# Patient Record
Sex: MALE | Race: WHITE | NOT HISPANIC OR LATINO | Employment: OTHER | URBAN - METROPOLITAN AREA
[De-identification: names, ages, dates, MRNs, and addresses within clinical notes are randomized per-mention and may not be internally consistent; named-entity substitution may affect disease eponyms.]

---

## 2018-10-08 ENCOUNTER — OFFICE VISIT CONVERTED (OUTPATIENT)
Dept: NEUROSURGERY | Facility: CLINIC | Age: 73
End: 2018-10-08
Attending: PHYSICIAN ASSISTANT

## 2018-10-25 ENCOUNTER — OFFICE VISIT CONVERTED (OUTPATIENT)
Dept: GASTROENTEROLOGY | Facility: CLINIC | Age: 73
End: 2018-10-25
Attending: PHYSICIAN ASSISTANT

## 2018-12-03 ENCOUNTER — CONVERSION ENCOUNTER (OUTPATIENT)
Dept: NEUROLOGY | Facility: CLINIC | Age: 73
End: 2018-12-03

## 2018-12-03 ENCOUNTER — OFFICE VISIT CONVERTED (OUTPATIENT)
Dept: NEUROSURGERY | Facility: CLINIC | Age: 73
End: 2018-12-03
Attending: NEUROLOGICAL SURGERY

## 2021-02-02 ENCOUNTER — HOSPITAL ENCOUNTER (OUTPATIENT)
Dept: VACCINE CLINIC | Facility: HOSPITAL | Age: 76
Discharge: HOME | End: 2021-02-02
Attending: INTERNAL MEDICINE

## 2021-02-25 ENCOUNTER — HOSPITAL ENCOUNTER (OUTPATIENT)
Dept: OTHER | Facility: HOSPITAL | Age: 76
Discharge: HOME | End: 2021-02-25
Attending: PHYSICIAN ASSISTANT

## 2021-02-26 ENCOUNTER — HOSPITAL ENCOUNTER (OUTPATIENT)
Dept: VACCINE CLINIC | Facility: HOSPITAL | Age: 76
Discharge: HOME | End: 2021-02-26
Attending: INTERNAL MEDICINE

## 2021-05-16 VITALS
HEIGHT: 71 IN | BODY MASS INDEX: 25.62 KG/M2 | DIASTOLIC BLOOD PRESSURE: 72 MMHG | WEIGHT: 183 LBS | SYSTOLIC BLOOD PRESSURE: 123 MMHG

## 2021-05-16 VITALS
HEART RATE: 76 BPM | HEIGHT: 71 IN | BODY MASS INDEX: 25.62 KG/M2 | SYSTOLIC BLOOD PRESSURE: 126 MMHG | WEIGHT: 183 LBS | DIASTOLIC BLOOD PRESSURE: 56 MMHG

## 2021-05-16 VITALS — BODY MASS INDEX: 26.32 KG/M2 | HEIGHT: 71 IN | HEART RATE: 72 BPM | WEIGHT: 188 LBS

## 2021-09-02 ENCOUNTER — OFFICE VISIT (OUTPATIENT)
Dept: PULMONOLOGY | Facility: CLINIC | Age: 76
End: 2021-09-02

## 2021-09-02 VITALS
SYSTOLIC BLOOD PRESSURE: 146 MMHG | TEMPERATURE: 98 F | OXYGEN SATURATION: 93 % | HEART RATE: 72 BPM | DIASTOLIC BLOOD PRESSURE: 68 MMHG | HEIGHT: 70 IN | BODY MASS INDEX: 26.34 KG/M2 | RESPIRATION RATE: 14 BRPM | WEIGHT: 184 LBS

## 2021-09-02 DIAGNOSIS — G25.2 COARSE TREMORS: ICD-10-CM

## 2021-09-02 DIAGNOSIS — J34.2 DNS (DEVIATED NASAL SEPTUM): ICD-10-CM

## 2021-09-02 DIAGNOSIS — G47.33 OSA (OBSTRUCTIVE SLEEP APNEA): ICD-10-CM

## 2021-09-02 DIAGNOSIS — J43.2 CENTRILOBULAR EMPHYSEMA (HCC): Primary | ICD-10-CM

## 2021-09-02 DIAGNOSIS — T78.40XA ALLERGY, INITIAL ENCOUNTER: ICD-10-CM

## 2021-09-02 DIAGNOSIS — G47.19 EXCESSIVE DAYTIME SLEEPINESS: ICD-10-CM

## 2021-09-02 DIAGNOSIS — R06.83 SNORING: ICD-10-CM

## 2021-09-02 DIAGNOSIS — J41.0 SIMPLE CHRONIC BRONCHITIS (HCC): ICD-10-CM

## 2021-09-02 DIAGNOSIS — H91.90 HEARING LOSS, UNSPECIFIED HEARING LOSS TYPE, UNSPECIFIED LATERALITY: ICD-10-CM

## 2021-09-02 DIAGNOSIS — R06.02 SHORTNESS OF BREATH: ICD-10-CM

## 2021-09-02 DIAGNOSIS — Z87.891 EX-SMOKER: ICD-10-CM

## 2021-09-02 DIAGNOSIS — R53.83 FATIGUE, UNSPECIFIED TYPE: ICD-10-CM

## 2021-09-02 PROCEDURE — 99203 OFFICE O/P NEW LOW 30 MIN: CPT | Performed by: SPECIALIST

## 2021-09-02 RX ORDER — ALBUTEROL SULFATE 90 UG/1
2 AEROSOL, METERED RESPIRATORY (INHALATION) EVERY 4 HOURS PRN
Qty: 1 G | Refills: 5 | Status: SHIPPED | OUTPATIENT
Start: 2021-09-02 | End: 2021-10-29

## 2021-09-02 RX ORDER — PRIMIDONE 250 MG/1
250 TABLET ORAL 2 TIMES DAILY
COMMUNITY

## 2021-09-02 RX ORDER — TESTOSTERONE CYPIONATE 200 MG/ML
0.25 INJECTION, SOLUTION INTRAMUSCULAR
COMMUNITY

## 2021-09-02 NOTE — PROGRESS NOTES
CONSULT NOTE     CHIEF COMPLAINT  Chief Complaint   Patient presents with   • Shortness of Breath   • New patient        Primary Care Provider  Aaron Sandoval MD     Referring Provider  No ref. provider found    Patient Complaint    ICD-10-CM ICD-9-CM   1. Centrilobular emphysema (CMS/[facility])  J43.2 492.8   2. Ex-smoker  Z87.891 V15.82   3. Allergy, initial encounter  T78.40XA 995.3   4. PAULA (obstructive sleep apnea)  G47.33 327.23   5. Simple chronic bronchitis (CMS/[facility])  J41.0 491.0   6. Shortness of breath  R06.02 786.05   7. Hearing loss, unspecified hearing loss type, unspecified laterality  H91.90 389.9   8. DNS (deviated nasal septum)  J34.2 470   9. Excessive daytime sleepiness  G47.19 780.54   10. Snoring  R06.83 786.09   11. Coarse tremors  G25.2 781.0   12. Fatigue, unspecified type  R53.83 780.79            Subjective          History of Presenting Illness  Rafael Salcedo is a 76 y.o. male who is a former smoker and referred to our practice for the pulmonary evaluation for the evaluation and management of shortness of breath.  Patient used to smoke for 50 years and quit in August 2020.  Patient has a dog in the house and used to work in the railroad work in the past.  Never had any alpha-1 test done and he was given inhaler and he is not sure that was helping.  Denied of having any change of weight.  He has been having the shortness of breath on any exertion and intermittent cough and somewhat hard of hearing denied any fever, chills, sweating, hemoptysis or any exposed anybody who is sick around him and no recent travel history.  Patient denies of having any loss of taste or smell.  Denied any nausea, vomiting, diarrhea and other related symptoms.  Denied any chest pain, palpitation and any other related diaphoretic symptoms.  Denied any general body weakness.  The patient was evaluated by Dr. Casey for his cardiac problems and patient had an EKG and echo and supposed to get the stress test and  the results are not available and unable to obtain at this time.  Patient has the essential peripheral tremors for which patient is taking the medications and never had any epilepsy or any other related patient is to take the some multivitamins in the past.  Patient was seen by the ENT physician who told that patient has recurrent sinus problem and has surgery done and also has a hard of hearing for which patient has the ear tubes.  Nothing was done at this time for the deviated nasal septum  I have personally reviewed the review of systems, past family, social, medical and surgical histories; and agree with their findings.  Admits for the snoring and disturbed sleep and extra daytime sleepiness during the day    HISTORY    Family History   Problem Relation Age of Onset   • Cancer Mother    • Heart failure Father    • Cancer Brother    • Cancer Maternal Uncle         Social History     Socioeconomic History   • Marital status:      Spouse name: Not on file   • Number of children: Not on file   • Years of education: Not on file   • Highest education level: Not on file   Tobacco Use   • Smoking status: Former Smoker     Packs/day: 0.50     Years: 50.00     Pack years: 25.00     Types: Cigarettes     Start date: 1971     Quit date: 2020     Years since quittin.0   • Smokeless tobacco: Never Used   • Tobacco comment: 1-6 cigg a day quit    Vaping Use   • Vaping Use: Never used   Substance and Sexual Activity   • Alcohol use: Defer   • Drug use: Defer   • Sexual activity: Defer        Past Medical History:   Diagnosis Date   • Shortness of breath           There is no immunization history on file for this patient.    No Known Allergies       Current Outpatient Medications:   •  Fexofenadine-Pseudoephedrine (ALLEGRA-D PO), Take  by mouth., Disp: , Rfl:   •  primidone (MYSOLINE) 250 MG tablet, primidone 250 mg oral tablet take 1 tablet (250 mg) by oral route 3 times per day for maintenance   Active,  Disp: , Rfl:   •  Testosterone Cypionate (Depo-Testosterone) 200 MG/ML injection, 0.25 mL., Disp: , Rfl:   •  albuterol sulfate  (90 Base) MCG/ACT inhaler, Inhale 2 puffs Every 4 (Four) Hours As Needed for Wheezing., Disp: 1 g, Rfl: 5  •  tiotropium bromide-olodaterol (STIOLTO RESPIMAT) 2.5-2.5 MCG/ACT aerosol solution inhaler, Inhale 2 puffs Daily., Disp: 1 each, Rfl: 5     Smoking status: Former smoker and quit in August 2020.  Used to smoke for 50 years.    Objective     Vital Signs:   Vitals:    09/02/21 0909   BP: 146/68   Pulse: 72   Resp: 14   Temp: 98 °F (36.7 °C)   SpO2: 93%        Physical Exam:  Alert and oriented x3.  Patient is hard of hearing.  Accompanied by his wife.  Pleasant to talk to.  Not in any apparent respiratory distress.  HEENT: Atraumatic anicteric.  Significant deviated nasal septum with almost occluded right nasal cavity.  Throat Mallampati class II-III airway high arched palate.  Neck: Supple, no JVD, trachea central.  No bruits heard.  Chest and lungs: Decreased breath sounds with prolonged expiratory phase and occasional scattered rhonchi.  Cardiovascular system: Regular rate and rhythm.  No murmurs appreciated.  Abdomen: Soft nontender no masses palpable.  Extremities: No clubbing/no cyanosis but has ankle edema.  Central nervous system: Grossly intact.  Result Review :   I have personally reviewed the report of the CT scan of the chest which was reported as moderate to severe centrilobular and paraseptal emphysema.  Mild bilateral lower lobe bronchial wall thickening what appeared to be like a bronchiectasis and coronary artery calcification.         Impression:  Former smoker  Severe shortness of breath.  Cough chronic.  Difficult to bring up the phlegm.  Abnormal CT scan of the chest as noted suggestive also of bronchiectasis in addition to the emphysema.  Not on any maintenance inhalation therapy.  Significant sleep disturbance with the snoring and disturbed sleep and  nocturia and excessive daytime sleepiness and significant deviated nasal septum and need to work-up for obstructive sleep apnea      Plan:  Patient education.  Give the patient Stiolto to take 2 puffs/day along with the rescue inhalation therapy.  And albuterol rescue inhalation therapy  Acapella.  Alpha-1 antitrypsin with the phenotype.  Complete pulmonary function testing and 6-minute walk.  IgE and mini Rast test.  Home sleep study  We will get the QuantiFERON gold for the TB screening.  We will get the CBC and other is as ordered.  General medical management is being well done by primary team physician.  If possible, we will try to get the echocardiogram and the cardiac work-up and management if necessary present management may need to be modified.  Patient education.    Follow Up   Return in about 3 months (around 12/2/2021).  Patient was given instructions and counseling regarding his condition or for health maintenance advice. Please see specific information pulled into the AVS if appropriate.     Henri Matthew MD

## 2021-09-02 NOTE — PATIENT INSTRUCTIONS
Allergies, Adult  An allergy means that your body reacts to something that bothers it (allergen). This can happen from something that you eat, breathe in, or touch.  Allergies often affect the nose, eyes, skin, and stomach. They can be mild, moderate, or very bad (severe). An allergy cannot spread from person to person. They can happen at any age. Sometimes, people outgrow them.  What are the causes?  · Outdoor things, such as pollen, car fumes, and mold.  · Indoor things, such as dust, smoke, mold, and pets.  · Foods.  · Medicines.  · Things that bother your skin, such as perfume and bug bites.  What increases the risk?  · Having family members with allergies or asthma.  What are the signs or symptoms?  Symptoms depend on how bad your allergy is.  Mild to moderate symptoms  · Runny nose, stuffy nose, or sneezing.  · Itchy mouth, ears, or throat.  · A feeling of mucus dripping down the back of your throat.  · Sore throat.  · Eyes that are itchy, red, watery, or puffy.  · A skin rash, or red, swollen areas of skin (hives).  · Stomach cramps or bloating.  Severe symptoms  Very bad allergies to food, medicine, or bug bites may cause a very bad allergy reaction (anaphylaxis). This can be life-threatening. Symptoms include:  · A red face.  · Wheezing or coughing.  · Swollen lips, tongue, or mouth.  · Tight or swollen throat.  · Chest pain or tightness, or a fast heartbeat.  · Trouble breathing or shortness of breath.  · Pain in your belly (abdomen), vomiting, or watery poop (diarrhea).  · Feeling dizzy or fainting.  How is this treated?         Treatment for this condition depends on your symptoms. Treatment may include:  · Cold, wet cloths for itching and swelling.  · Eye drops, nose sprays, or skin creams.  · Washing out your nose each day.  · A humidifier.  · Medicines.  · A change to the foods you eat.  · Being exposed again and again to tiny amounts of allergens. This helps your body get used to them. You might  have:  ? Allergy shots.  ? Very small amounts of allergen put under your tongue.  · An emergency shot (auto-injector pen) if you have a very bad allergy reaction.  ? This is a medicine with a needle. You can put it into your skin by yourself.  ? Your doctor will teach you how to use it.  Follow these instructions at home:  Medicines    · Take or apply over-the-counter and prescription medicines only as told by your doctor.  · If you are at risk for a very bad allergy reaction, keep an auto-injector pen with you all the time.  Eating and drinking  · Follow instructions from your doctor about what to eat and drink.  · Drink enough fluid to keep your pee (urine) pale yellow.  General instructions  · If you have ever had a very bad allergy reaction, wear a medical alert bracelet or necklace.  · Stay away from things that you are allergic to.  · Keep all follow-up visits as told by your doctor. This is important.  Contact a doctor if:  · Your symptoms do not get better with treatment.  Get help right away if:  · You have symptoms of a very bad allergy reaction. These include:  ? A swollen mouth, tongue, or throat.  ? Pain or tightness in your chest.  ? Trouble breathing.  ? Being short of breath.  ? Dizziness.  ? Fainting.  ? Very bad pain in your belly.  ? Vomiting.  ? Watery poop.  These symptoms may be an emergency. Do not wait to see if the symptoms will go away. Get medical help right away. Call your local emergency services (911 in the [place] Do not drive yourself to the hospital.  Summary  · Take or apply over-the-counter and prescription medicines only as told by your doctor.  · Stay away from things you are allergic to.  · If you are at risk for a very bad allergy reaction, carry an auto-injector pen all the time.  · Wear a medical alert bracelet or necklace.  · Very bad allergy reactions can be life-threatening. Get help right away.  This information is not intended to replace advice given to you by your health  care provider. Make sure you discuss any questions you have with your health care provider.  Document Revised: 10/28/2020 Document Reviewed: 10/28/2020  Elsevier Patient Education © 2021 [employer]    Fatigue  If you have fatigue, you feel tired all the time and have a lack of energy or a lack of motivation. Fatigue may make it difficult to start or complete tasks because of exhaustion. In general, occasional or mild fatigue is often a normal response to activity or life. However, long-lasting (chronic) or extreme fatigue may be a symptom of a medical condition.  Follow these instructions at home:  General instructions  · Watch your fatigue for any changes.  · Go to bed and get up at the same time every day.  · Avoid fatigue by pacing yourself during the day and getting enough sleep at night.  · Maintain a healthy weight.  Medicines  · Take over-the-counter and prescription medicines only as told by your health care provider.  · Take a multivitamin, if told by your health care provider.   · Do not use herbal or dietary supplements unless they are approved by your health care provider.  Activity    · Exercise regularly, as told by your health care provider.  · Use or practice techniques to help you relax, such as yoga, alena chi, meditation, or massage therapy.  Eating and drinking    · Avoid heavy meals in the evening.  · Eat a well-balanced diet, which includes lean proteins, whole grains, plenty of fruits and vegetables, and low-fat dairy products.  · Avoid consuming too much caffeine.  · Avoid the use of alcohol.  · Drink enough fluid to keep your urine pale yellow.  Lifestyle  · Change situations that cause you stress. Try to keep your work and personal schedule in balance.  · Do not use any products that contain nicotine or tobacco, such as cigarettes and e-cigarettes. If you need help quitting, ask your health care provider.  · Do not use drugs.  Contact a health care provider if:  · Your fatigue does not get  better.  · You have a fever.  · You suddenly lose or gain weight.  · You have headaches.  · You have trouble falling asleep or sleeping through the night.  · You feel angry, guilty, anxious, or sad.  · You are unable to have a bowel movement (constipation).  · Your skin is dry.  · You have swelling in your legs or another part of your body.  Get help right away if:  · You feel confused.  · Your vision is blurry.  · You feel faint or you pass out.  · You have a severe headache.  · You have severe pain in your abdomen, your back, or the area between your waist and hips (pelvis).  · You have chest pain, shortness of breath, or an irregular or fast heartbeat.  · You are unable to urinate, or you urinate less than normal.  · You have abnormal bleeding, such as bleeding from the rectum, vagina, nose, lungs, or nipples.  · You vomit blood.  · You have thoughts about hurting yourself or others.  If you ever feel like you may hurt yourself or others, or have thoughts about taking your own life, get help right away. You can go to your nearest emergency department or call:  · Your local emergency services (911 in the [place]  · A suicide crisis helpline, such as the National Suicide Prevention Lifeline at 1-688.392.5657. This is open 24 hours a day.  Summary  · If you have fatigue, you feel tired all the time and have a lack of energy or a lack of motivation.  · Fatigue may make it difficult to start or complete tasks because of exhaustion.  · Long-lasting (chronic) or extreme fatigue may be a symptom of a medical condition.  · Exercise regularly, as told by your health care provider.  · Change situations that cause you stress. Try to keep your work and personal schedule in balance.  This information is not intended to replace advice given to you by your health care provider. Make sure you discuss any questions you have with your health care provider.  Document Revised: 07/08/2020 Document Reviewed: 09/12/2018  [employer] Patient  Education © 2021 [employer] Inc[employer]    Essential Tremor  A tremor is trembling or shaking that a person cannot control. Most tremors affect the hands or arms. Tremors can also affect the head, vocal cords, legs, and other parts of the body. Essential tremor is a tremor without a known cause. Usually, it occurs while a person is trying to perform an action. It tends to get worse gradually as a person ages.  What are the causes?  The cause of this condition is not known.  What increases the risk?  You are more likely to develop this condition if:  · You have a family member with essential tremor.  · You are age 40 or older.  · You take certain medicines.  What are the signs or symptoms?  The main sign of a tremor is a rhythmic shaking of certain parts of your body that is uncontrolled and unintentional. You may:  · Have difficulty eating with a spoon or fork.  · Have difficulty writing.  · Nod your head up and down or side to side.  · Have a quivering voice.  The shaking may:  · Get worse over time.  · Come and go.  · Be more noticeable on one side of your body.  · Get worse due to stress, fatigue, caffeine, and extreme heat or cold.  How is this diagnosed?  This condition may be diagnosed based on:  · Your symptoms and medical history.  · A physical exam.  There is no single test to diagnose an essential tremor. However, your health care provider may order tests to rule out other causes of your condition. These may include:  · Blood and urine tests.  · Imaging studies of your brain, such as CT scan and MRI.  · A test that measures involuntary muscle movement (electromyogram).  How is this treated?  Treatment for essential tremor depends on the severity of the condition.  · Some tremors may go away without treatment.  · Mild tremors may not need treatment if they do not affect your day-to-day life.  · Severe tremors may need to be treated using one or more of the following options:  ? Medicines.  ? Lifestyle  changes.  ? Occupational or physical therapy.  Follow these instructions at home:  Lifestyle    · Do not use any products that contain nicotine or tobacco, such as cigarettes and e-cigarettes. If you need help quitting, ask your health care provider.  · Limit your caffeine intake as told by your health care provider.  · Try to get 8 hours of sleep each night.  · Find ways to manage your stress that fits your lifestyle and personality. Consider trying meditation or yoga.  · Try to anticipate stressful situations and allow extra time to manage them.  · If you are struggling emotionally with the effects of your tremor, consider working with a mental health provider.  General instructions  · Take over-the-counter and prescription medicines only as told by your health care provider.  · Avoid extreme heat and extreme cold.  · Keep all follow-up visits as told by your health care provider. This is important. Visits may include physical therapy visits.  Contact a health care provider if:  · You experience any changes in the location or intensity of your tremors.  · You start having a tremor after starting a new medicine.  · You have tremor with other symptoms, such as:  ? Numbness.  ? Tingling.  ? Pain.  ? Weakness.  · Your tremor gets worse.  · Your tremor interferes with your daily life.  · You feel down, blue, or sad for at least 2 weeks in a row.  · Worrying about your tremor and what other people think about you interferes with your everyday life functions, including relationships, work, or school.  Summary  · Essential tremor is a tremor without a known cause. Usually, it occurs when you are trying to perform an action.  · The cause of this condition is not known.  · The main sign of a tremor is a rhythmic shaking of certain parts of your body that is uncontrolled and unintentional.  · Treatment for essential tremor depends on the severity of the condition.  This information is not intended to replace advice given to  you by your health care provider. Make sure you discuss any questions you have with your health care provider.  Document Revised: 12/28/2018 Document Reviewed: 12/28/2018  Elsevier Patient Education © 2021 [employer]    Hypersomnia  Hypersomnia is a condition in which a person feels very tired during the day even though he or she gets plenty of sleep at night. A person with this condition may take naps during the day and may find it very difficult to wake up from sleep. Hypersomnia may affect a person's ability to think, concentrate, drive, or remember things.  What are the causes?  The cause of this condition may not be known. Possible causes include:  · Certain medicines.  · Sleep disorders, such as narcolepsy and sleep apnea.  · Injury to the head, brain, or spinal cord.  · Drug or alcohol use.  · Gastroesophageal reflux disease (GERD).  · Tumors.  · Certain medical conditions, such as depression, diabetes, or an underactive thyroid gland (hypothyroidism).  What are the signs or symptoms?  The main symptoms of hypersomnia include:  · Feeling very tired throughout the day, regardless of how much sleep you got the night before.  · Having trouble waking up. Others may find it difficult to wake you up when you are sleeping.  · Sleeping for longer and longer periods at a time.  · Taking naps throughout the day.  Other symptoms may include:  · Feeling restless, anxious, or annoyed.  · Lacking energy.  · Having trouble with:  ? Remembering.  ? Speaking.  ? Thinking.  · Loss of appetite.  · Seeing, hearing, tasting, smelling, or feeling things that are not real (hallucinations).  How is this diagnosed?  This condition may be diagnosed based on:  · Your symptoms and medical history.  · Your sleeping habits. Your health care provider may ask you to write down your sleeping habits in a daily sleep log, along with any symptoms you have.  · A series of tests that are done while you sleep (sleep study or  polysomnogram).  · A test that measures how quickly you can fall asleep during the day (daytime nap study or multiple sleep latency test).  How is this treated?  Treatment can help you manage your condition. Treatment may include:  · Following a regular sleep routine.  · Lifestyle changes, such as changing your eating habits, getting regular exercise, and avoiding alcohol or caffeinated beverages.  · Taking medicines to make you more alert (stimulants) during the day.  · Treating any underlying medical causes of hypersomnia.  Follow these instructions at home:  Sleep routine    · Schedule the same bedtime and wake-up time each day.  · Practice a relaxing bedtime routine. This may include reading, meditation, deep breathing, or taking a warm bath before going to sleep.  · Get regular exercise each day. Avoid strenuous exercise in the evening hours.  · Keep your sleep environment at a cooler temperature, darkened, and quiet.  · Sleep with pillows and a mattress that are comfortable and supportive.  · Schedule short 20-minute naps for when you feel sleepiest during the day.  · Talk with your employer or teachers about your hypersomnia. If possible, adjust your schedule so that:  ? You have a regular daytime work schedule.  ? You can take a scheduled nap during the day.  ? You do not have to work or be active at night.  · Do not eat a heavy meal for a few hours before bedtime. Eat your meals at about the same times every day.  · Avoid drinking alcohol or caffeinated beverages.  Safety    · Do not drive or use heavy machinery if you are sleepy. Ask your health care provider if it is safe for you to drive.  · Wear a life jacket when swimming or spending time near water.  General instructions  · Take supplements and over-the-counter and prescription medicines only as told by your health care provider.  · Keep a sleep log that will help your doctor manage your condition. This may include information about:  ? What time you  go to bed each night.  ? How often you wake up at night.  ? How many hours you sleep at night.  ? How often and for how long you nap during the day.  ? Any observations from others, such as leg movements during sleep, sleep walking, or snoring.  · Keep all follow-up visits as told by your health care provider. This is important.  Contact a health care provider if:  · You have new symptoms.  · Your symptoms get worse.  Get help right away if:  · You have serious thoughts about hurting yourself or someone else.  If you ever feel like you may hurt yourself or others, or have thoughts about taking your own life, get help right away. You can go to your nearest emergency department or call:  · Your local emergency services (911 in the [place]  · A suicide crisis helpline, such as the National Suicide Prevention Lifeline at 1-420.621.4245. This is open 24 hours a day.  Summary  · Hypersomnia refers to a condition in which you feel very tired during the day even though you get plenty of sleep at night.  · A person with this condition may take naps during the day and may find it very difficult to wake up from sleep.  · Hypersomnia may affect a person's ability to think, concentrate, drive, or remember things.  · Treatment, such as following a regular sleep routine and making some lifestyle changes, can help you manage your condition.  This information is not intended to replace advice given to you by your health care provider. Make sure you discuss any questions you have with your health care provider.  Document Revised: 12/20/2018 Document Reviewed: 12/20/2018  Elsevier Patient Education © 2021 Else[employer] Inc.    Shortness of Breath, Adult  Shortness of breath means you have trouble breathing. Shortness of breath could be a sign of a medical problem.  Follow these instructions at home:    · Watch for any changes in your symptoms.  · Do not use any products that contain nicotine or tobacco, such as cigarettes, e-cigarettes, and  chewing tobacco.  · Do not smoke. Smoking can cause shortness of breath. If you need help to quit smoking, ask your doctor.  · Avoid things that can make it harder to breathe, such as:  ? Mold.  ? Dust.  ? Air pollution.  ? Chemical smells.  ? Things that can cause allergy symptoms (allergens), if you have allergies.  · Keep your living space clean. Use products that help remove mold and dust.  · Rest as needed. Slowly return to your normal activities.  · Take over-the-counter and prescription medicines only as told by your doctor. This includes oxygen therapy and inhaled medicines.  · Keep all follow-up visits as told by your doctor. This is important.  Contact a doctor if:  · Your condition does not get better as soon as expected.  · You have a hard time doing your normal activities, even after you rest.  · You have new symptoms.  Get help right away if:  · Your shortness of breath gets worse.  · You have trouble breathing when you are resting.  · You feel light-headed or you pass out (faint).  · You have a cough that is not helped by medicines.  · You cough up blood.  · You have pain with breathing.  · You have pain in your chest, arms, shoulders, or belly (abdomen).  · You have a fever.  · You cannot walk up stairs.  · You cannot exercise the way you normally do.  These symptoms may represent a serious problem that is an emergency. Do not wait to see if the symptoms will go away. Get medical help right away. Call your local emergency services (911 in the [place] Do not drive yourself to the hospital.  Summary  · Shortness of breath is when you have trouble breathing enough air. It can be a sign of a medical problem.  · Avoid things that make it hard for you to breathe, such as smoking, pollution, mold, and dust.  · Watch for any changes in your symptoms. Contact your doctor if you do not get better or you get worse.  This information is not intended to replace advice given to you by your health care provider.  Make sure you discuss any questions you have with your health care provider.  Document Revised: 05/20/2019 Document Reviewed: 05/20/2019  [employer] Patient Education © 2021 [employer]    Screening for Sleep Apnea    Sleep apnea is a condition in which breathing pauses or becomes shallow during sleep. Sleep apnea screening is a test to determine if you are at risk for sleep apnea. The test is easy and only takes a few minutes. Your health care provider may ask you to have this test in preparation for surgery or as part of a physical exam.  What are the symptoms of sleep apnea?  Common symptoms of sleep apnea include:  · Snoring.  · Restless sleep.  · Daytime sleepiness.  · Pauses in breathing.  · Choking during sleep.  · Irritability.  · Forgetfulness.  · Trouble thinking clearly.  · Depression.  · Personality changes.  Most people with sleep apnea are not aware that they have it.  Why should I get screened?  Getting screened for sleep apnea can help:  · Ensure your safety. It is important for your health care providers to know whether or not you have sleep apnea, especially if you are having surgery or have other long-term (chronic) health conditions.  · Improve your health and allow you to get a better night's rest. Restful sleep can help you:  ? Have more energy.  ? Lose weight.  ? Improve high blood pressure.  ? Improve diabetes management.  ? Prevent stroke.  ? Prevent car accidents.  How is screening done?  Screening usually includes being asked a list of questions about your sleep quality. Some questions you may be asked include:  · Do you snore?  · Is your sleep restless?  · Do you have daytime sleepiness?  · Has a partner or spouse told you that you stop breathing during sleep?  · Have you had trouble concentrating or memory loss?  If your screening test is positive, you are at risk for the condition. Further testing may be needed to confirm a diagnosis of sleep apnea.  Where to find more information  You  can find screening tools online or at your health care clinic. For more information about sleep apnea screening and healthy sleep, visit these websites:  · Centers for Disease Control and Prevention: www.cdc.gov/sleep/index.html  · American Sleep Apnea Association: www.sleepapnea.org  Contact a health care provider if:  · You think that you may have sleep apnea.  Summary  · Sleep apnea screening can help determine if you are at risk for sleep apnea.  · It is important for your health care providers to know whether or not you have sleep apnea, especially if you are having surgery or have other chronic health conditions.  · You may be asked to take a screening test for sleep apnea in preparation for surgery or as part of a physical exam.  This information is not intended to replace advice given to you by your health care provider. Make sure you discuss any questions you have with your health care provider.  Document Revised: 10/04/2019 Document Reviewed: 03/30/2018  [employer] Patient Education © 2021 [employer]    Sleep Apnea  Sleep apnea affects breathing during sleep. It causes breathing to stop for a short time or to become shallow. It can also increase the risk of:  · Heart attack.  · Stroke.  · Being very overweight (obese).  · Diabetes.  · Heart failure.  · Irregular heartbeat.  The goal of treatment is to help you breathe normally again.  What are the causes?  There are three kinds of sleep apnea:  · Obstructive sleep apnea. This is caused by a blocked or collapsed airway.  · Central sleep apnea. This happens when the brain does not send the right signals to the muscles that control breathing.  · Mixed sleep apnea. This is a combination of obstructive and central sleep apnea.  The most common cause of this condition is a collapsed or blocked airway. This can happen if:  · Your throat muscles are too relaxed.  · Your tongue and tonsils are too large.  · You are overweight.  · Your airway is too small.  What  increases the risk?  · Being overweight.  · Smoking.  · Having a small airway.  · Being older.  · Being male.  · Drinking alcohol.  · Taking medicines to calm yourself (sedatives or tranquilizers).  · Having family members with the condition.  What are the signs or symptoms?  · Trouble staying asleep.  · Being sleepy or tired during the day.  · Getting angry a lot.  · Loud snoring.  · Headaches in the morning.  · Not being able to focus your mind (concentrate).  · Forgetting things.  · Less interest in sex.  · Mood swings.  · Personality changes.  · Feelings of sadness (depression).  · Waking up a lot during the night to pee (urinate).  · Dry mouth.  · Sore throat.  How is this diagnosed?  · Your medical history.  · A physical exam.  · A test that is done when you are sleeping (sleep study). The test is most often done in a sleep lab but may also be done at home.  How is this treated?    · Sleeping on your side.  · Using a medicine to get rid of mucus in your nose (decongestant).  · Avoiding the use of alcohol, medicines to help you relax, or certain pain medicines (narcotics).  · Losing weight, if needed.  · Changing your diet.  · Not smoking.  · Using a machine to open your airway while you sleep, such as:  ? An oral appliance. This is a mouthpiece that shifts your lower jaw forward.  ? A CPAP device. This device blows air through a mask when you breathe out (exhale).  ? An EPAP device. This has valves that you put in each nostril.  ? A BPAP device. This device blows air through a mask when you breathe in (inhale) and breathe out.  · Having surgery if other treatments do not work.  It is important to get treatment for sleep apnea. Without treatment, it can lead to:  · High blood pressure.  · Coronary artery disease.  · In men, not being able to have an erection (impotence).  · Reduced thinking ability.  Follow these instructions at home:  Lifestyle  · Make changes that your doctor recommends.  · Eat a healthy  diet.  · Lose weight if needed.  · Avoid alcohol, medicines to help you relax, and some pain medicines.  · Do not use any products that contain nicotine or tobacco, such as cigarettes, e-cigarettes, and chewing tobacco. If you need help quitting, ask your doctor.  General instructions  · Take over-the-counter and prescription medicines only as told by your doctor.  · If you were given a machine to use while you sleep, use it only as told by your doctor.  · If you are having surgery, make sure to tell your doctor you have sleep apnea. You may need to bring your device with you.  · Keep all follow-up visits as told by your doctor. This is important.  Contact a doctor if:  · The machine that you were given to use during sleep bothers you or does not seem to be working.  · You do not get better.  · You get worse.  Get help right away if:  · Your chest hurts.  · You have trouble breathing in enough air.  · You have an uncomfortable feeling in your back, arms, or stomach.  · You have trouble talking.  · One side of your body feels weak.  · A part of your face is hanging down.  These symptoms may be an emergency. Do not wait to see if the symptoms will go away. Get medical help right away. Call your local emergency services (911 in the [place] Do not drive yourself to the hospital.  Summary  · This condition affects breathing during sleep.  · The most common cause is a collapsed or blocked airway.  · The goal of treatment is to help you breathe normally while you sleep.  This information is not intended to replace advice given to you by your health care provider. Make sure you discuss any questions you have with your health care provider.  Document Revised: 10/04/2019 Document Reviewed: 08/13/2019  Elsevier Patient Education © 2021 [employer]

## 2021-09-03 ENCOUNTER — LAB (OUTPATIENT)
Dept: LAB | Facility: HOSPITAL | Age: 76
End: 2021-09-03

## 2021-09-03 DIAGNOSIS — J41.0 SIMPLE CHRONIC BRONCHITIS (HCC): ICD-10-CM

## 2021-09-03 DIAGNOSIS — Z87.891 EX-SMOKER: ICD-10-CM

## 2021-09-03 DIAGNOSIS — T78.40XA ALLERGY, INITIAL ENCOUNTER: ICD-10-CM

## 2021-09-03 DIAGNOSIS — J43.2 CENTRILOBULAR EMPHYSEMA (HCC): ICD-10-CM

## 2021-09-03 DIAGNOSIS — R06.02 SHORTNESS OF BREATH: ICD-10-CM

## 2021-09-03 LAB
BASOPHILS # BLD AUTO: 0.06 10*3/MM3 (ref 0–0.2)
BASOPHILS NFR BLD AUTO: 0.8 % (ref 0–1.5)
DEPRECATED RDW RBC AUTO: 45.4 FL (ref 37–54)
EOSINOPHIL # BLD AUTO: 0.35 10*3/MM3 (ref 0–0.4)
EOSINOPHIL NFR BLD AUTO: 4.5 % (ref 0.3–6.2)
ERYTHROCYTE [DISTWIDTH] IN BLOOD BY AUTOMATED COUNT: 13.2 % (ref 12.3–15.4)
HCT VFR BLD AUTO: 43.6 % (ref 37.5–51)
HGB BLD-MCNC: 15.3 G/DL (ref 13–17.7)
IMM GRANULOCYTES # BLD AUTO: 0.05 10*3/MM3 (ref 0–0.05)
IMM GRANULOCYTES NFR BLD AUTO: 0.6 % (ref 0–0.5)
LYMPHOCYTES # BLD AUTO: 1.54 10*3/MM3 (ref 0.7–3.1)
LYMPHOCYTES NFR BLD AUTO: 19.8 % (ref 19.6–45.3)
MCH RBC QN AUTO: 33 PG (ref 26.6–33)
MCHC RBC AUTO-ENTMCNC: 35.1 G/DL (ref 31.5–35.7)
MCV RBC AUTO: 94.2 FL (ref 79–97)
MONOCYTES # BLD AUTO: 0.55 10*3/MM3 (ref 0.1–0.9)
MONOCYTES NFR BLD AUTO: 7.1 % (ref 5–12)
NEUTROPHILS NFR BLD AUTO: 5.22 10*3/MM3 (ref 1.7–7)
NEUTROPHILS NFR BLD AUTO: 67.2 % (ref 42.7–76)
NRBC BLD AUTO-RTO: 0 /100 WBC (ref 0–0.2)
PLATELET # BLD AUTO: 255 10*3/MM3 (ref 140–450)
PMV BLD AUTO: 10.2 FL (ref 6–12)
RBC # BLD AUTO: 4.63 10*6/MM3 (ref 4.14–5.8)
WBC # BLD AUTO: 7.77 10*3/MM3 (ref 3.4–10.8)

## 2021-09-03 PROCEDURE — 86003 ALLG SPEC IGE CRUDE XTRC EA: CPT

## 2021-09-03 PROCEDURE — 82785 ASSAY OF IGE: CPT

## 2021-09-03 PROCEDURE — 85025 COMPLETE CBC W/AUTO DIFF WBC: CPT

## 2021-09-03 PROCEDURE — 86480 TB TEST CELL IMMUN MEASURE: CPT

## 2021-09-03 PROCEDURE — 36415 COLL VENOUS BLD VENIPUNCTURE: CPT

## 2021-09-07 LAB
A ALTERNATA IGE QN: <0.1 KU/L
A FUMIGATUS IGE QN: <0.1 KU/L
AMER ROACH IGE QN: <0.1 KU/L
BAHIA GRASS IGE QN: <0.1 KU/L
BAYBERRY POLN IGE QN: <0.1 KU/L
BERMUDA GRASS IGE QN: <0.1 KU/L
BOXELDER IGE QN: <0.1 KU/L
C HERBARUM IGE QN: <0.1 KU/L
CAT DANDER IGE QN: <0.1 KU/L
COMMON RAGWEED IGE QN: <0.1 KU/L
CONV CLASS DESCRIPTION: NORMAL
D FARINAE IGE QN: <0.1 KU/L
D PTERONYSS IGE QN: <0.1 KU/L
DOG DANDER IGE QN: <0.1 KU/L
DOG FENNEL IGE QN: <0.1 KU/L
ENGL PLANTAIN IGE QN: <0.1 KU/L
GOOSEFOOT IGE QN: <0.1 KU/L
GUM-TREE IGE QN: <0.1 KU/L
ITALIAN CYPRESS IGE QN: <0.1 KU/L
JOHNSON GRASS IGE QN: <0.1 KU/L
M RACEMOSUS IGE QN: <0.1 KU/L
P NOTATUM IGE QN: <0.1 KU/L
PEPPER TREE IGE QN: <0.1 KU/L
PER RYE GRASS IGE QN: <0.1 KU/L
PRIVET IGE QN: <0.1 KU/L
QUEEN PALM IGE QN: <0.1 KU/L
S BOTRYOSUM IGE QN: <0.1 KU/L
SHEEP SORREL IGE QN: <0.1 KU/L
VIRG LIVE OAK IGE QN: <0.1 KU/L
WHITE ELM IGE QN: <0.1 KU/L

## 2021-09-08 ENCOUNTER — HOSPITAL ENCOUNTER (OUTPATIENT)
Dept: SLEEP MEDICINE | Facility: HOSPITAL | Age: 76
Discharge: HOME | End: 2021-09-08
Admitting: SPECIALIST

## 2021-09-08 DIAGNOSIS — G47.33 OSA (OBSTRUCTIVE SLEEP APNEA): ICD-10-CM

## 2021-09-08 DIAGNOSIS — R53.83 FATIGUE, UNSPECIFIED TYPE: ICD-10-CM

## 2021-09-08 DIAGNOSIS — G47.19 EXCESSIVE DAYTIME SLEEPINESS: ICD-10-CM

## 2021-09-08 DIAGNOSIS — J34.2 DNS (DEVIATED NASAL SEPTUM): ICD-10-CM

## 2021-09-08 DIAGNOSIS — H91.90 HEARING LOSS, UNSPECIFIED HEARING LOSS TYPE, UNSPECIFIED LATERALITY: ICD-10-CM

## 2021-09-08 LAB — IGE SERPL-ACNC: 57.7 KU/L

## 2021-09-08 PROCEDURE — 95806 SLEEP STUDY UNATT&RESP EFFT: CPT | Performed by: INTERNAL MEDICINE

## 2021-09-08 PROCEDURE — 95806 SLEEP STUDY UNATT&RESP EFFT: CPT

## 2021-09-10 LAB
GAMMA INTERFERON BACKGROUND BLD IA-ACNC: 0.04 IU/ML
M TB IFN-G BLD-IMP: POSITIVE
M TB IFN-G CD4+ BCKGRND COR BLD-ACNC: 0.09 IU/ML
M TB IFN-G CD4+CD8+ BCKGRND COR BLD-ACNC: 0.4 IU/ML
MITOGEN IGNF BLD-ACNC: >10 IU/ML
QUANTIFERON INCUBATION: ABNORMAL
SERVICE CMNT-IMP: ABNORMAL

## 2021-09-13 DIAGNOSIS — G47.33 OSA (OBSTRUCTIVE SLEEP APNEA): Primary | ICD-10-CM

## 2021-09-13 DIAGNOSIS — R06.83 SNORING: ICD-10-CM

## 2021-09-13 DIAGNOSIS — G47.34 SLEEP RELATED HYPOXIA: ICD-10-CM

## 2021-09-13 DIAGNOSIS — J44.9 CHRONIC OBSTRUCTIVE PULMONARY DISEASE, UNSPECIFIED COPD TYPE (HCC): ICD-10-CM

## 2021-09-16 DIAGNOSIS — G47.33 OSA (OBSTRUCTIVE SLEEP APNEA): Primary | ICD-10-CM

## 2021-09-17 ENCOUNTER — OFFICE VISIT (OUTPATIENT)
Dept: PULMONOLOGY | Facility: CLINIC | Age: 76
End: 2021-09-17

## 2021-09-17 VITALS
HEIGHT: 70 IN | OXYGEN SATURATION: 92 % | HEART RATE: 72 BPM | TEMPERATURE: 97.8 F | WEIGHT: 184 LBS | SYSTOLIC BLOOD PRESSURE: 124 MMHG | RESPIRATION RATE: 17 BRPM | DIASTOLIC BLOOD PRESSURE: 64 MMHG | BODY MASS INDEX: 26.34 KG/M2

## 2021-09-17 DIAGNOSIS — Z87.891 EX-SMOKER: ICD-10-CM

## 2021-09-17 DIAGNOSIS — R09.02 HYPOXIA: ICD-10-CM

## 2021-09-17 DIAGNOSIS — J47.9 BRONCHIECTASIS WITHOUT COMPLICATION (HCC): ICD-10-CM

## 2021-09-17 DIAGNOSIS — J43.9 PULMONARY EMPHYSEMA, UNSPECIFIED EMPHYSEMA TYPE (HCC): ICD-10-CM

## 2021-09-17 DIAGNOSIS — R76.12 POSITIVE QUANTIFERON-TB GOLD TEST: Primary | ICD-10-CM

## 2021-09-17 PROCEDURE — 99213 OFFICE O/P EST LOW 20 MIN: CPT | Performed by: SPECIALIST

## 2021-09-17 NOTE — PATIENT INSTRUCTIONS
Bronchiectasis    Bronchiectasis is a condition in which the airways in the lungs (bronchi) are damaged and widened. The condition makes it hard for the lungs to get rid of mucus, and it causes mucus to gather in the bronchi. This condition often leads to lung infections, which can make the condition worse.  What are the causes?  You can be born with this condition or you can develop it later in life. Common causes of this condition include:  · Cystic fibrosis.  · Repeated lung infections, such as pneumonia or tuberculosis.  · An object or other blockage in the lungs.  · Breathing in fluid, food, or other objects (aspiration).  · A problem with the immune system and lung structure that is present at birth (congenital).  Sometimes the cause is not known.  What are the signs or symptoms?  Common symptoms of this condition include:  · A daily cough that brings up mucus and lasts for more than 3 weeks.  · Lung infections that happen often.  · Shortness of breath and wheezing.  · Weakness and fatigue.  How is this diagnosed?  This condition is diagnosed with tests, such as:  · Chest X-rays or CT scans. These are done to check for changes in the lungs.  · Breathing tests. These are done to check how well your lungs are working.  · A test of a sample of your saliva (sputum culture). This test is done to check for infection.  · Blood tests and other tests. These are done to check for related diseases or causes.  How is this treated?  Treatment for this condition depends on the severity of the illness and its cause. Treatment may include:  · Medicines that loosen mucus so it can be coughed up (expectorants).  · Medicines that relax the muscles of the bronchi (bronchodilators).  · Antibiotic medicines to prevent or treat infection.  · Physical therapy to help clear mucus from the lungs. Techniques may include:  ? Postural drainage. This is when you sit or lie in certain positions so that mucus can drain by gravity.  ? Chest  percussion. This involves tapping the chest or back with a cupped hand.  ? Chest vibration. For this therapy, a hand or special equipment vibrates your chest and back.  · Surgery to remove the affected part of the lung. This may be done in severe cases.  Follow these instructions at home:  Medicines  · Take over-the-counter and prescription medicines only as told by your health care provider.  · If you were prescribed an antibiotic medicine, take it as told by your health care provider. Do not stop taking the antibiotic even if you start to feel better.  · Avoid taking sedatives and antihistamines unless your health care provider tells you to take them. These medicines tend to thicken the mucus in the lungs.  Managing symptoms  · Perform breathing exercises or techniques to clear your lungs as told by your health care provider.  · Consider using a cold steam vaporizer or humidifier in your room or home to help loosen secretions.  · If you have a cough that gets worse at night, try sleeping in a semi-upright position.  General instructions  · Get plenty of rest.  · Drink enough fluid to keep your urine clear or pale yellow.  · Stay inside when pollution and ozone levels are high.  · Stay up to date with vaccinations and immunizations.  · Avoid cigarette smoke and other lung irritants.  · Do not use any products that contain nicotine or tobacco, such as cigarettes and e-cigarettes. If you need help quitting, ask your health care provider.  · Keep all follow-up visits as told by your health care provider. This is important.  Contact a health care provider if:  · You cough up more sputum than before and the sputum is yellow or green in color.  · You have a fever.  · You cannot control your cough and are losing sleep.  Get help right away if:  · You cough up blood.  · You have chest pain.  · You have increasing shortness of breath.  · You have pain that gets worse or is not controlled with medicines.  · You have a fever  and your symptoms suddenly get worse.  Summary  · Bronchiectasis is a condition in which the airways in the lungs (bronchi) are damaged and widened. The condition makes it hard for the lungs to get rid of mucus, and it causes mucus to gather in the bronchi.  · Treatment usually includes therapy to help clear mucus from the lungs.  · Stay up to date with vaccinations and immunizations.  This information is not intended to replace advice given to you by your health care provider. Make sure you discuss any questions you have with your health care provider.  Document Revised: 11/30/2018 Document Reviewed: 01/22/2018  [employer] Patient Education © 2021 [employer]    Tuberculin Skin Test  Why am I having this test?  The tuberculin skin test is used to check whether a person has been exposed to the bacteria that causes tuberculosis (TB) (Mycobacterium tuberculosis). Tuberculosis is a bacterial infection that usually affects the lungs but can affect other parts of the body. You may have a tuberculin skin test if:  · You have possible symptoms of TB, such as:  ? Coughing up blood, mucus from the lungs (sputum), or both.  ? A cough that lasts three weeks or longer.  ? Chest pain, or pain while breathing or coughing.  ? Unexplained weight loss.  ? Fatigue and weakness.  ? Fever, sweating, and chills.  ? Loss of appetite.  · You are at high risk for getting TB. You may be at high risk if you:  ? Inject illegal drugs or share needles.  ? Have HIV or other diseases that affect the disease-fighting (immune) system.  ? Work in a health care facility.  ? Live in a high-risk community, such as a homeless shelter, nursing home, or correctional facility.  ? Have had contact with someone who has TB.  ? Are from or have traveled to a country where TB is common.  If you are at high risk, you may need to have regular TB screenings. TB screening may be required when starting a new job, such as becoming a health care worker or a teacher.  Colleges or universities may require TB screening for new students.  What is being tested?  This test checks for the presence of TB antibodies in the body. Antibodies are a type of cell that is part of the body's immune system. After you get an infection, your body makes antibodies that stay in your body after you recover and protect you from getting the same infection again.  Tell a health care provider about:  · Any allergies you have.  · All medicines you are taking, including vitamins, herbs, eye drops, creams, and over-the-counter medicines.  · Any blood disorders you have.  · Any surgeries you have had.  · Any medical conditions you have.  · Whether you are pregnant or may be pregnant.  What happens during the test?    Your health care provider will inject a solution called PPD (purified protein derivative) under the first layer of skin on your arm. This causes a small, blister-like bump to form over the area temporarily. PPD is made from the bacteria that causes TB. PPD causes your immune system to react, but it does not get you sick with TB.  You may feel mild stinging as this happens. Afterward, the area may itch or burn.  How are the results reported?  To get your test results, you will need to see your health care provider again within 2-3 days after you received the injection. It is important to follow your health care provider's instructions about when to be seen again. If you are not seen within 2-3 days, you may need to have the test repeated. At your follow-up visit, your health care provider will measure the area where the PPD was injected to see if the bump has gotten larger due to swelling. Your results will be reported as positive or negative:  · If the bump has disappeared or is small, your test result is negative. Negative means that you do not have the antibodies.  · If the bump is large, your test result is positive. Positive means that you have the antibodies. Swelling is caused by the  antibodies reacting with the PPD. The skin may also turn red around the bump.  A false-positive result can occur. A false positive is incorrect because it means that a condition is present when it is not.  A false-negative result can occur. A false negative is incorrect because it means that a condition is not present when it is. False negatives are rare and are more likely to occur in older people and in people who have weakened immune systems.  What do the results mean?  A negative result means that it is unlikely that you have TB or that you have been exposed to TB bacteria. This test may be repeated, or you may have a blood test to check for TB. This is because your body may not react to the tuberculin skin test until several weeks after exposure to TB bacteria.  A positive result means that you have been exposed to TB, and you may need more tests to determine if you have:  · Active TB, also called TB disease. This means that you have TB symptoms and your infection can spread to others (you are contagious).  · Latent TB. This means that you do not have any symptoms of TB and you are not contagious. Latent TB can turn into active TB.  Talk with your health care provider about what your results mean.  Questions to ask your health care provider  Ask your health care provider, or the department that is doing the test:  · When will my results be ready?  · How will I get my results?  · What are my treatment options?  · What other tests do I need?  · What are my next steps?  Summary  · The tuberculin skin test is used to check whether a person has been exposed to the bacteria that causes tuberculosis (TB).  · Your health care provider will inject a solution known as PPD (purified protein derivative) under the first layer of skin on your arm.  · After 2-3 days, your health care provider will measure the area where the PPD was injected to see if the bump has gotten larger due to swelling.  · Your results will be reported  as positive or negative. A positive result means that you have been exposed to TB. A negative result means that it is unlikely that you have TB or that you have been exposed to TB bacteria.  This information is not intended to replace advice given to you by your health care provider. Make sure you discuss any questions you have with your health care provider.  Document Revised: 11/30/2018 Document Reviewed: 08/29/2018  [employer] Patient Education © 2021 [employer]

## 2021-09-17 NOTE — H&P (VIEW-ONLY)
Progress note    CHIEF COMPLAINT  Chief Complaint   Patient presents with   • Follow-up     Follow up per Dr. BAL/ discuss results    • Cough   • Wheezing        Primary Care Provider  Aaron Sandoval MD     Referring Provider  No ref. provider found    Patient Complaint    ICD-10-CM ICD-9-CM   1. Positive QuantiFERON-TB Gold test  R76.12 795.52   2. Bronchiectasis without complication (CMS/[facility])  J47.9 494.0   3. Pulmonary emphysema, unspecified emphysema type (CMS/[facility])  J43.9 492.8   4. Ex-smoker  Z87.891 V15.82   5. Hypoxia  R09.02 799.02            Subjective          History of Presenting Illness  Rafael Salcedo is a 76 y.o. male was seen by me recently with the significant emphysema, CT scan also shows bronchiectasis chronic cough and productive sputum.  Patient was treated and the work-up is done and came here for the follow-up.  The sleep study results seems to be somewhat confusing and the overall respiratory event index was reported as 3.7 but the patient slept mostly in prone position and the respiratory events in position of 31.8 and also pronation of 34.6 and on the right side 3.1 and left side 8.5 and the results seems to be not correlating the overall picture.  We called the sleep center and the study is not read yet and will cancel the CPAP order at this time till I get the official report from the sleep physician.  Patient has the allergy testing showed his IgE is elevated but not allergy to any environmental allergies.  Patient worked at in the [employer] and at present he is retired and has a dog in the house.  Patient's QuantiFERON gold for TB is positive which she never had it before.  His cough and the shortness of breath is much improved with the inhalation therapy.  Patient denies of having any night sweats, any productive sputum at this time.  Denied any hemoptysis, loss of weight.  Did not have any family history of tuberculosis.  His cough is significantly improved.  Patient does use the  Hannya which is helping as well.  Other review the systems are noncontributory at this point  I have personally reviewed the review of systems, past family, social, medical and surgical histories; and agree with their findings.      HISTORY    Family History   Problem Relation Age of Onset   • Cancer Mother    • Heart failure Father    • Cancer Brother    • Cancer Maternal Uncle         Social History     Socioeconomic History   • Marital status:      Spouse name: Not on file   • Number of children: Not on file   • Years of education: Not on file   • Highest education level: Not on file   Tobacco Use   • Smoking status: Former Smoker     Packs/day: 0.50     Years: 50.00     Pack years: 25.00     Types: Cigarettes     Start date: 1971     Quit date: 2020     Years since quittin.0   • Smokeless tobacco: Never Used   • Tobacco comment: 1-6 cigg a day quit    Vaping Use   • Vaping Use: Never used   Substance and Sexual Activity   • Alcohol use: Defer   • Drug use: Defer   • Sexual activity: Defer        Past Medical History:   Diagnosis Date   • Shortness of breath         Immunization History   Administered Date(s) Administered   • COVID-19 (MODERNA) 2021, 2021       No Known Allergies       Current Outpatient Medications:   •  albuterol sulfate  (90 Base) MCG/ACT inhaler, Inhale 2 puffs Every 4 (Four) Hours As Needed for Wheezing., Disp: 1 g, Rfl: 5  •  Fexofenadine-Pseudoephedrine (ALLEGRA-D PO), Take  by mouth., Disp: , Rfl:   •  primidone (MYSOLINE) 250 MG tablet, primidone 250 mg oral tablet take 1 tablet (250 mg) by oral route 3 times per day for maintenance   Active, Disp: , Rfl:   •  Testosterone Cypionate (Depo-Testosterone) 200 MG/ML injection, 0.25 mL., Disp: , Rfl:   •  tiotropium bromide-olodaterol (STIOLTO RESPIMAT) 2.5-2.5 MCG/ACT aerosol solution inhaler, Inhale 2 puffs Daily., Disp: 1 each, Rfl: 5     Smoking status: Former smoker    Objective     Vital  Signs:   Vitals:    09/17/21 0813   BP: 124/64   Pulse: 72   Resp: 17   Temp: 97.8 °F (36.6 °C)   SpO2: 92%        Physical Exam: Very pleasant gentleman accompanied by his wife.  Answering the questions appropriately.  Comfortable at rest not having any respiratory symptoms in the room.  HEENT: Atraumatic anicteric.  Patient is using the face mask for COVID-19 precautions.  Neck: Supple, no JVD, no masses palpable, no bruits heard.  Trachea central.  Chest and lungs: Nontender.  Clear to auscultation.  Cardiovascular system: Regular rate and rhythm.  No murmurs appreciated.  Abdomen: Soft: Nontender.  Bowel sounds present.  No masses appreciated.  Extremities: No clubbing, no cyanosis, no edema  Central nervous system: Grossly intact     Result Review :   I have personally reviewed the lab data and again the previous medical history and the sleep study reports are reviewed and official sleep study report by the sleep doctor is pending.         Impression:  Positive QuantiFERON gold for TB  Bronchiectasis  Emphysema  Snoring  Questionable sleep apnea and the official results are pending.  Relative hypoxemia but overall he is desaturations are not significant in the sleep study.      Plan:  Initially added the CPAP at present after reviewing the study I put on hold and ask the sleep doctor to review the records report  In view of the positive QuantiFERON gold for TB and the bronchiectasis and other, I will proceed with the bronchoscopy and explained to him about the bronchoscopy with the bronchial wash with inspection and other as needed.  And also explained to him that the complications are rare but can occur including the collapse of the lung severe bleeding and respiratory failure and that those things will be corrected most of the time.  Very rarely the thing scorpion overhangs.  The patient and his wife expressed understanding and the patient education was given.  Encouraged the patient to continue to use the  Acapella and take the steam inhalation along with the other inhalation therapy as those are helping him a lot.  If the patient has been having the cough or any productive sputum worsening of the shortness of breath night sweats spitting up blood you should stay away from the other people and also call us are call 911 and go to the emergency room and inform us.  Otherwise I will see him back again sometime in 4 to 6 weeks either with me or with other physicians to go through the results.  In the meantime depending on these sleep study results, will pursue whether the patient needs the CPAP or not.  Other medical problems and management is being well done by primary attending physician.    Follow Up   Return in about 6 weeks (around 10/29/2021).  Patient was given instructions and counseling regarding his condition or for health maintenance advice. Please see specific information pulled into the AVS if appropriate.     Henri Matthew MD

## 2021-09-17 NOTE — PROGRESS NOTES
Progress note    CHIEF COMPLAINT  Chief Complaint   Patient presents with   • Follow-up     Follow up per Dr. BAL/ discuss results    • Cough   • Wheezing        Primary Care Provider  Aaron Sandoval MD     Referring Provider  No ref. provider found    Patient Complaint    ICD-10-CM ICD-9-CM   1. Positive QuantiFERON-TB Gold test  R76.12 795.52   2. Bronchiectasis without complication (CMS/[facility])  J47.9 494.0   3. Pulmonary emphysema, unspecified emphysema type (CMS/[facility])  J43.9 492.8   4. Ex-smoker  Z87.891 V15.82   5. Hypoxia  R09.02 799.02            Subjective          History of Presenting Illness  Rafael Salcedo is a 76 y.o. male was seen by me recently with the significant emphysema, CT scan also shows bronchiectasis chronic cough and productive sputum.  Patient was treated and the work-up is done and came here for the follow-up.  The sleep study results seems to be somewhat confusing and the overall respiratory event index was reported as 3.7 but the patient slept mostly in prone position and the respiratory events in position of 31.8 and also pronation of 34.6 and on the right side 3.1 and left side 8.5 and the results seems to be not correlating the overall picture.  We called the sleep center and the study is not read yet and will cancel the CPAP order at this time till I get the official report from the sleep physician.  Patient has the allergy testing showed his IgE is elevated but not allergy to any environmental allergies.  Patient worked at in the [employer] and at present he is retired and has a dog in the house.  Patient's QuantiFERON gold for TB is positive which she never had it before.  His cough and the shortness of breath is much improved with the inhalation therapy.  Patient denies of having any night sweats, any productive sputum at this time.  Denied any hemoptysis, loss of weight.  Did not have any family history of tuberculosis.  His cough is significantly improved.  Patient does use the  Hannya which is helping as well.  Other review the systems are noncontributory at this point  I have personally reviewed the review of systems, past family, social, medical and surgical histories; and agree with their findings.      HISTORY    Family History   Problem Relation Age of Onset   • Cancer Mother    • Heart failure Father    • Cancer Brother    • Cancer Maternal Uncle         Social History     Socioeconomic History   • Marital status:      Spouse name: Not on file   • Number of children: Not on file   • Years of education: Not on file   • Highest education level: Not on file   Tobacco Use   • Smoking status: Former Smoker     Packs/day: 0.50     Years: 50.00     Pack years: 25.00     Types: Cigarettes     Start date: 1971     Quit date: 2020     Years since quittin.0   • Smokeless tobacco: Never Used   • Tobacco comment: 1-6 cigg a day quit    Vaping Use   • Vaping Use: Never used   Substance and Sexual Activity   • Alcohol use: Defer   • Drug use: Defer   • Sexual activity: Defer        Past Medical History:   Diagnosis Date   • Shortness of breath         Immunization History   Administered Date(s) Administered   • COVID-19 (MODERNA) 2021, 2021       No Known Allergies       Current Outpatient Medications:   •  albuterol sulfate  (90 Base) MCG/ACT inhaler, Inhale 2 puffs Every 4 (Four) Hours As Needed for Wheezing., Disp: 1 g, Rfl: 5  •  Fexofenadine-Pseudoephedrine (ALLEGRA-D PO), Take  by mouth., Disp: , Rfl:   •  primidone (MYSOLINE) 250 MG tablet, primidone 250 mg oral tablet take 1 tablet (250 mg) by oral route 3 times per day for maintenance   Active, Disp: , Rfl:   •  Testosterone Cypionate (Depo-Testosterone) 200 MG/ML injection, 0.25 mL., Disp: , Rfl:   •  tiotropium bromide-olodaterol (STIOLTO RESPIMAT) 2.5-2.5 MCG/ACT aerosol solution inhaler, Inhale 2 puffs Daily., Disp: 1 each, Rfl: 5     Smoking status: Former smoker    Objective     Vital  Signs:   Vitals:    09/17/21 0813   BP: 124/64   Pulse: 72   Resp: 17   Temp: 97.8 °F (36.6 °C)   SpO2: 92%        Physical Exam: Very pleasant gentleman accompanied by his wife.  Answering the questions appropriately.  Comfortable at rest not having any respiratory symptoms in the room.  HEENT: Atraumatic anicteric.  Patient is using the face mask for COVID-19 precautions.  Neck: Supple, no JVD, no masses palpable, no bruits heard.  Trachea central.  Chest and lungs: Nontender.  Clear to auscultation.  Cardiovascular system: Regular rate and rhythm.  No murmurs appreciated.  Abdomen: Soft: Nontender.  Bowel sounds present.  No masses appreciated.  Extremities: No clubbing, no cyanosis, no edema  Central nervous system: Grossly intact     Result Review :   I have personally reviewed the lab data and again the previous medical history and the sleep study reports are reviewed and official sleep study report by the sleep doctor is pending.         Impression:  Positive QuantiFERON gold for TB  Bronchiectasis  Emphysema  Snoring  Questionable sleep apnea and the official results are pending.  Relative hypoxemia but overall he is desaturations are not significant in the sleep study.      Plan:  Initially added the CPAP at present after reviewing the study I put on hold and ask the sleep doctor to review the records report  In view of the positive QuantiFERON gold for TB and the bronchiectasis and other, I will proceed with the bronchoscopy and explained to him about the bronchoscopy with the bronchial wash with inspection and other as needed.  And also explained to him that the complications are rare but can occur including the collapse of the lung severe bleeding and respiratory failure and that those things will be corrected most of the time.  Very rarely the thing scorpion overhangs.  The patient and his wife expressed understanding and the patient education was given.  Encouraged the patient to continue to use the  Acapella and take the steam inhalation along with the other inhalation therapy as those are helping him a lot.  If the patient has been having the cough or any productive sputum worsening of the shortness of breath night sweats spitting up blood you should stay away from the other people and also call us are call 911 and go to the emergency room and inform us.  Otherwise I will see him back again sometime in 4 to 6 weeks either with me or with other physicians to go through the results.  In the meantime depending on these sleep study results, will pursue whether the patient needs the CPAP or not.  Other medical problems and management is being well done by primary attending physician.    Follow Up   Return in about 6 weeks (around 10/29/2021).  Patient was given instructions and counseling regarding his condition or for health maintenance advice. Please see specific information pulled into the AVS if appropriate.     Henri Matthew MD

## 2021-09-22 RX ORDER — CHOLECALCIFEROL (VITAMIN D3) 50 MCG
2000 TABLET ORAL DAILY
COMMUNITY

## 2021-09-22 RX ORDER — ZINC GLUCONATE 50 MG
TABLET ORAL
COMMUNITY

## 2021-09-22 RX ORDER — MULTIVIT WITH MINERALS/LUTEIN
1000 TABLET ORAL DAILY
Status: ON HOLD | COMMUNITY
End: 2021-09-23

## 2021-09-22 RX ORDER — VIT C/B6/B5/MAGNESIUM/HERB 173 50-5-6-5MG
CAPSULE ORAL
COMMUNITY

## 2021-09-22 NOTE — PRE-PROCEDURE INSTRUCTIONS
Pt. Instructed on NPO after midnight, pre-op meds. Ok to take all meds except Vitamin D, Curcumin, Vitamin E, Zinc a.m. of procedure.       fully vaccinated

## 2021-09-23 ENCOUNTER — HOSPITAL ENCOUNTER (OUTPATIENT)
Facility: HOSPITAL | Age: 76
Setting detail: HOSPITAL OUTPATIENT SURGERY
Discharge: HOME | End: 2021-09-23
Attending: INTERNAL MEDICINE | Admitting: INTERNAL MEDICINE

## 2021-09-23 ENCOUNTER — ANESTHESIA (OUTPATIENT)
Dept: GASTROENTEROLOGY | Facility: HOSPITAL | Age: 76
End: 2021-09-23

## 2021-09-23 ENCOUNTER — ANESTHESIA EVENT (OUTPATIENT)
Dept: GASTROENTEROLOGY | Facility: HOSPITAL | Age: 76
End: 2021-09-23

## 2021-09-23 VITALS
TEMPERATURE: 97 F | RESPIRATION RATE: 24 BRPM | HEART RATE: 86 BPM | WEIGHT: 186.51 LBS | SYSTOLIC BLOOD PRESSURE: 157 MMHG | BODY MASS INDEX: 26.76 KG/M2 | DIASTOLIC BLOOD PRESSURE: 94 MMHG | OXYGEN SATURATION: 93 %

## 2021-09-23 DIAGNOSIS — J43.9 PULMONARY EMPHYSEMA, UNSPECIFIED EMPHYSEMA TYPE (HCC): ICD-10-CM

## 2021-09-23 DIAGNOSIS — R76.12 POSITIVE QUANTIFERON-TB GOLD TEST: ICD-10-CM

## 2021-09-23 DIAGNOSIS — J47.9 BRONCHIECTASIS WITHOUT COMPLICATION (HCC): ICD-10-CM

## 2021-09-23 DIAGNOSIS — R09.02 HYPOXIA: ICD-10-CM

## 2021-09-23 DIAGNOSIS — Z87.891 EX-SMOKER: ICD-10-CM

## 2021-09-23 LAB
ACB CMPLX DNA BAL NAA+NON-PRB-NCNCRNG: NOT DETECTED
BLACTX-M ISLT/SPM QL: NOT DETECTED
BLAIMP ISLT/SPM QL: NOT DETECTED
BLAKPC ISLT/SPM QL: NOT DETECTED
BLAOXA-48-LIKE ISLT/SPM QL: ABNORMAL
BLAVIM ISLT/SPM QL: NOT DETECTED
C PNEUM DNA NPH QL NAA+NON-PROBE: NOT DETECTED
E CLOAC COMP DNA BAL NAA+NON-PRB-NCNCRNG: NOT DETECTED
E COLI DNA BAL NAA+NON-PRB-NCNCRNG: NOT DETECTED
FLUAV SUBTYP SPEC NAA+PROBE: NOT DETECTED
FLUBV RNA ISLT QL NAA+PROBE: NOT DETECTED
GP B STREP DNA BAL NAA+NON-PRB-NCNCRNG: NOT DETECTED
HADV DNA SPEC NAA+PROBE: NOT DETECTED
HAEM INFLU DNA BAL NAA+NON-PRB-NCNCRNG: NOT DETECTED
HCOV RNA LOWER RESP QL NAA+NON-PROBE: NOT DETECTED
HMPV RNA NPH QL NAA+NON-PROBE: NOT DETECTED
HPIV RNA LOWER RESP QL NAA+NON-PROBE: NOT DETECTED
K AEROGENES DNA BAL NAA+NON-PRB-NCNCRNG: NOT DETECTED
K OXYTOCA DNA BAL NAA+NON-PRB-NCNCRNG: NOT DETECTED
K PNEU GRP DNA BAL NAA+NON-PRB-NCNCRNG: NOT DETECTED
L PNEUMO DNA LOWER RESP QL NAA+NON-PROBE: NOT DETECTED
M CATARRHALIS DNA BAL NAA+NON-PRB-NCNCRNG: NOT DETECTED
M PNEUMO IGG SER IA-ACNC: NOT DETECTED
MECA+MECC ISLT/SPM QL: DETECTED
NDM GENE: NOT DETECTED
P AERUGINOSA DNA BAL NAA+NON-PRB-NCNCRNG: DETECTED
PROTEUS SP DNA BAL NAA+NON-PRB-NCNCRNG: NOT DETECTED
RHINOVIRUS RNA SPEC NAA+PROBE: NOT DETECTED
RSV RNA NPH QL NAA+NON-PROBE: NOT DETECTED
S AUREUS DNA BAL NAA+NON-PRB-NCNCRNG: DETECTED
S MARCESCENS DNA BAL NAA+NON-PRB-NCNCRNG: NOT DETECTED
S PNEUM DNA BAL NAA+NON-PRB-NCNCRNG: NOT DETECTED
S PYO DNA BAL NAA+NON-PRB-NCNCRNG: NOT DETECTED

## 2021-09-23 PROCEDURE — 87186 SC STD MICRODIL/AGAR DIL: CPT | Performed by: INTERNAL MEDICINE

## 2021-09-23 PROCEDURE — 87206 SMEAR FLUORESCENT/ACID STAI: CPT | Performed by: INTERNAL MEDICINE

## 2021-09-23 PROCEDURE — 87116 MYCOBACTERIA CULTURE: CPT | Performed by: INTERNAL MEDICINE

## 2021-09-23 PROCEDURE — 88312 SPECIAL STAINS GROUP 1: CPT | Performed by: INTERNAL MEDICINE

## 2021-09-23 PROCEDURE — 87071 CULTURE AEROBIC QUANT OTHER: CPT | Performed by: INTERNAL MEDICINE

## 2021-09-23 PROCEDURE — 31624 DX BRONCHOSCOPE/LAVAGE: CPT | Performed by: INTERNAL MEDICINE

## 2021-09-23 PROCEDURE — 87077 CULTURE AEROBIC IDENTIFY: CPT | Performed by: INTERNAL MEDICINE

## 2021-09-23 PROCEDURE — 87633 RESP VIRUS 12-25 TARGETS: CPT | Performed by: INTERNAL MEDICINE

## 2021-09-23 PROCEDURE — 87102 FUNGUS ISOLATION CULTURE: CPT | Performed by: INTERNAL MEDICINE

## 2021-09-23 PROCEDURE — 25010000002 PROPOFOL 10 MG/ML EMULSION: Performed by: NURSE ANESTHETIST, CERTIFIED REGISTERED

## 2021-09-23 PROCEDURE — 87205 SMEAR GRAM STAIN: CPT | Performed by: INTERNAL MEDICINE

## 2021-09-23 RX ORDER — MAGNESIUM HYDROXIDE 1200 MG/15ML
LIQUID ORAL AS NEEDED
Status: DISCONTINUED | OUTPATIENT
Start: 2021-09-23 | End: 2021-09-23 | Stop reason: HOSPADM

## 2021-09-23 RX ORDER — SODIUM CHLORIDE, SODIUM LACTATE, POTASSIUM CHLORIDE, CALCIUM CHLORIDE 600; 310; 30; 20 MG/100ML; MG/100ML; MG/100ML; MG/100ML
30 INJECTION, SOLUTION INTRAVENOUS CONTINUOUS
Status: DISCONTINUED | OUTPATIENT
Start: 2021-09-23 | End: 2021-09-23 | Stop reason: HOSPADM

## 2021-09-23 RX ORDER — MULTIVIT WITH MINERALS/LUTEIN
1000 TABLET ORAL DAILY
COMMUNITY

## 2021-09-23 RX ORDER — PROPOFOL 10 MG/ML
VIAL (ML) INTRAVENOUS AS NEEDED
Status: DISCONTINUED | OUTPATIENT
Start: 2021-09-23 | End: 2021-09-23 | Stop reason: SURG

## 2021-09-23 RX ORDER — LIDOCAINE HYDROCHLORIDE 20 MG/ML
INJECTION, SOLUTION INFILTRATION; PERINEURAL AS NEEDED
Status: DISCONTINUED | OUTPATIENT
Start: 2021-09-23 | End: 2021-09-23 | Stop reason: SURG

## 2021-09-23 RX ORDER — LIDOCAINE HYDROCHLORIDE 40 MG/ML
INJECTION, SOLUTION RETROBULBAR; TOPICAL AS NEEDED
Status: DISCONTINUED | OUTPATIENT
Start: 2021-09-23 | End: 2021-09-23 | Stop reason: HOSPADM

## 2021-09-23 RX ADMIN — SODIUM CHLORIDE, POTASSIUM CHLORIDE, SODIUM LACTATE AND CALCIUM CHLORIDE 30 ML/HR: 600; 310; 30; 20 INJECTION, SOLUTION INTRAVENOUS at 13:20

## 2021-09-23 RX ADMIN — PROPOFOL 100 MG: 10 INJECTION, EMULSION INTRAVENOUS at 14:13

## 2021-09-23 RX ADMIN — LIDOCAINE HYDROCHLORIDE 50 MG: 20 INJECTION, SOLUTION INFILTRATION; PERINEURAL at 14:14

## 2021-09-23 RX ADMIN — PROPOFOL 150 MCG/KG/MIN: 10 INJECTION, EMULSION INTRAVENOUS at 14:15

## 2021-09-23 NOTE — ANESTHESIA PREPROCEDURE EVALUATION
Anesthesia Evaluation     Patient summary reviewed and Nursing notes reviewed   no history of anesthetic complications:  NPO Solid Status: > 8 hours  NPO Liquid Status: > 2 hours           Airway   Mallampati: II  TM distance: >3 FB  Neck ROM: full  No difficulty expected  Dental - normal exam     Pulmonary - normal exam   (+) a smoker Former, COPD, shortness of breath, sleep apnea,   Cardiovascular - negative cardio ROS and normal exam  Exercise tolerance: good (4-7 METS)        Neuro/Psych  (+) tremors,     GI/Hepatic/Renal/Endo - negative ROS     Musculoskeletal (-) negative ROS    Abdominal  - normal exam    Bowel sounds: normal.   Substance History - negative use     OB/GYN negative ob/gyn ROS         Other - negative ROS                       Anesthesia Plan    ASA 2     general   total IV anesthesia  intravenous induction     Anesthetic plan, all risks, benefits, and alternatives have been provided, discussed and informed consent has been obtained with: patient.

## 2021-09-23 NOTE — OP NOTE
Procedure name bronchoscopy with bronchoalveolar lavage     Indication positive TB QuantiFERON with chronic cough     Sedation-IV MAC per anesthesia service        Procedure details  Patient was brought back to the bronchoscopy suite a bite block was placed in the oral cavity, and a therapeutic bronchoscope was then used to intubate the trachea, the vocal cords inspected and appeared to have normal motion with inhalation and ventilation, inspection was performed of the left tracheobronchial tree and there were no endobronchial lesion seen to the segmental level, inspection of the right tracheobronchial tree showed no endobronchial lesions to the segmental level.  There is diffuse mucus plugging seen with chronic bronchitis seen throughout the entire tracheobronchial tree, the bronchial mucosa was very friable and erythematous A bronchoalveolar lavage was obtained from right lower lobe bronchus.           estimated blood loss  none     postoperative diagnosis  Bronchitis  Diffuse mucus plugging  Positive TB QuantiFERON    Patient tolerated procedure well complications        Plan  Patient is to followup in my office as scheduled to go the results of the pathology/cytology, and microbiology

## 2021-09-23 NOTE — ANESTHESIA POSTPROCEDURE EVALUATION
Patient: Rafael Salcedo    Procedure Summary     Date: 09/23/21 Room / Location: [facility] ENDOSCOPY 3 / [facility]    Anesthesia Start: 1412 Anesthesia Stop: 1435    Procedure: BRONCHOSCOPY BIOPSY AND BRONCHIAL LAVAGE WITH FLUORO IF NEEDED AND ANESTHESIA (N/A Bronchus) Diagnosis:       Pulmonary emphysema, unspecified emphysema type (HCC)      Ex-smoker      Bronchiectasis without complication (HCC)      Hypoxia      Positive QuantiFERON-TB Gold test      (Pulmonary emphysema, unspecified emphysema type (CMS/HCC) [J43.9])      (Ex-smoker [Z87.891])      (Bronchiectasis without complication (CMS/HCC) [J47.9])      (Hypoxia [R09.02])      (Positive QuantiFERON-TB Gold test [R76.12])    Surgeons: Ruiz Eastman DO Provider: Aspen Aguilar DO    Anesthesia Type: general ASA Status: 2          Anesthesia Type: general    Vitals  No vitals data found for the desired time range.          Post Anesthesia Care and Evaluation    Patient location during evaluation: bedside  Patient participation: complete - patient participated  Level of consciousness: awake  Pain management: adequate  Airway patency: patent  Anesthetic complications: No anesthetic complications  PONV Status: none  Cardiovascular status: acceptable and stable  Respiratory status: acceptable  Hydration status: acceptable    Comments: An Anesthesiologist personally participated in the most demanding procedures (including induction and emergence if applicable) in the anesthesia plan, monitored the course of anesthesia administration at frequent intervals and remained physically present and available for immediate diagnosis and treatment of emergencies.

## 2021-09-24 DIAGNOSIS — J15.1 PNEUMONIA OF RIGHT LUNG DUE TO PSEUDOMONAS SPECIES, UNSPECIFIED PART OF LUNG (HCC): Primary | ICD-10-CM

## 2021-09-24 RX ORDER — LEVOFLOXACIN 750 MG/1
750 TABLET ORAL DAILY
Qty: 5 TABLET | Refills: 0 | Status: SHIPPED | OUTPATIENT
Start: 2021-09-24 | End: 2021-09-29

## 2021-09-26 LAB
BACTERIA SPEC AEROBE CULT: ABNORMAL
BACTERIA SPEC AEROBE CULT: ABNORMAL
GRAM STN SPEC: ABNORMAL
GRAM STN SPEC: ABNORMAL

## 2021-09-27 LAB
CYTO UR: NORMAL
LAB AP CASE REPORT: NORMAL
LAB AP CLINICAL INFORMATION: NORMAL
LAB AP SPECIAL STAINS: NORMAL
PATH REPORT.FINAL DX SPEC: NORMAL
PATH REPORT.GROSS SPEC: NORMAL
STAT OF ADQ CVX/VAG CYTO-IMP: NORMAL

## 2021-09-28 DIAGNOSIS — G47.33 OSA (OBSTRUCTIVE SLEEP APNEA): Primary | ICD-10-CM

## 2021-09-30 RX ORDER — TIOTROPIUM BROMIDE AND OLODATEROL 3.124; 2.736 UG/1; UG/1
2 SPRAY, METERED RESPIRATORY (INHALATION)
Qty: 2 EACH | Refills: 0 | COMMUNITY
Start: 2021-09-30 | End: 2021-10-29 | Stop reason: SDUPTHER

## 2021-10-12 DIAGNOSIS — G47.33 OSA (OBSTRUCTIVE SLEEP APNEA): Primary | ICD-10-CM

## 2021-10-12 DIAGNOSIS — R09.02 HYPOXIA: ICD-10-CM

## 2021-10-14 ENCOUNTER — TELEPHONE (OUTPATIENT)
Dept: PULMONOLOGY | Facility: CLINIC | Age: 76
End: 2021-10-14

## 2021-10-18 ENCOUNTER — TELEPHONE (OUTPATIENT)
Dept: PULMONOLOGY | Facility: CLINIC | Age: 76
End: 2021-10-18

## 2021-10-22 LAB — FUNGUS WND CULT: NORMAL

## 2021-10-26 ENCOUNTER — TELEPHONE (OUTPATIENT)
Dept: PULMONOLOGY | Facility: CLINIC | Age: 76
End: 2021-10-26

## 2021-10-26 NOTE — TELEPHONE ENCOUNTER
Received a call from Dayne at  lab.  Specimen obtained from bronchoscopy with BAL on 09/23/2021 with Dr. Eastman has shown positive for AFB today.  This specimen is being sent to the [facility] for further analysis.

## 2021-10-29 ENCOUNTER — OFFICE VISIT (OUTPATIENT)
Dept: PULMONOLOGY | Facility: CLINIC | Age: 76
End: 2021-10-29

## 2021-10-29 VITALS
HEART RATE: 71 BPM | HEIGHT: 70 IN | BODY MASS INDEX: 27.2 KG/M2 | DIASTOLIC BLOOD PRESSURE: 77 MMHG | OXYGEN SATURATION: 92 % | TEMPERATURE: 98.2 F | SYSTOLIC BLOOD PRESSURE: 155 MMHG | RESPIRATION RATE: 18 BRPM | WEIGHT: 190 LBS

## 2021-10-29 DIAGNOSIS — J15.212 PNEUMONIA DUE TO METHICILLIN RESISTANT STAPHYLOCOCCUS AUREUS (MRSA), UNSPECIFIED LATERALITY, UNSPECIFIED PART OF LUNG (HCC): ICD-10-CM

## 2021-10-29 DIAGNOSIS — R09.02 HYPOXIA: ICD-10-CM

## 2021-10-29 DIAGNOSIS — J15.1 PNEUMONIA DUE TO PSEUDOMONAS SPECIES, UNSPECIFIED LATERALITY, UNSPECIFIED PART OF LUNG (HCC): ICD-10-CM

## 2021-10-29 DIAGNOSIS — J43.9 PULMONARY EMPHYSEMA, UNSPECIFIED EMPHYSEMA TYPE (HCC): ICD-10-CM

## 2021-10-29 DIAGNOSIS — J47.9 BRONCHIECTASIS WITHOUT COMPLICATION (HCC): ICD-10-CM

## 2021-10-29 DIAGNOSIS — R76.12 POSITIVE QUANTIFERON-TB GOLD TEST: Primary | ICD-10-CM

## 2021-10-29 DIAGNOSIS — Z87.891 EX-SMOKER: ICD-10-CM

## 2021-10-29 PROCEDURE — 99214 OFFICE O/P EST MOD 30 MIN: CPT | Performed by: SPECIALIST

## 2021-10-29 RX ORDER — BUDESONIDE 0.5 MG/2ML
0.5 INHALANT ORAL 2 TIMES DAILY
Qty: 60 EACH | Refills: 5
Start: 2021-10-29 | End: 2022-07-14

## 2021-10-29 RX ORDER — ACETAMINOPHEN 160 MG
1 TABLET,DISINTEGRATING ORAL 2 TIMES DAILY
Qty: 60 EACH | Refills: 1 | Status: SHIPPED | OUTPATIENT
Start: 2021-10-29 | End: 2021-11-28

## 2021-10-29 RX ORDER — ALBUTEROL SULFATE 90 UG/1
2 AEROSOL, METERED RESPIRATORY (INHALATION) EVERY 4 HOURS PRN
Qty: 1 G | Refills: 5 | Status: SHIPPED | OUTPATIENT
Start: 2021-10-29

## 2021-10-29 RX ORDER — FORMOTEROL FUMARATE 20 UG/2ML
20 SOLUTION RESPIRATORY (INHALATION)
Qty: 60 EACH | Refills: 5
Start: 2021-10-29 | End: 2022-04-06 | Stop reason: SDUPTHER

## 2021-10-29 RX ORDER — LEVOFLOXACIN 750 MG/1
750 TABLET ORAL DAILY
Qty: 1 TABLET | Refills: 15 | Status: SHIPPED | OUTPATIENT
Start: 2021-10-29 | End: 2022-04-06

## 2021-10-29 RX ORDER — ALBUTEROL SULFATE 2.5 MG/3ML
2.5 SOLUTION RESPIRATORY (INHALATION) EVERY 4 HOURS PRN
Qty: 120 EACH | Refills: 5
Start: 2021-10-29

## 2021-10-29 RX ORDER — SULFAMETHOXAZOLE AND TRIMETHOPRIM 800; 160 MG/1; MG/1
1 TABLET ORAL 2 TIMES DAILY
Qty: 30 TABLET | Refills: 0 | Status: SHIPPED | OUTPATIENT
Start: 2021-10-29 | End: 2021-11-13

## 2021-10-29 NOTE — PROGRESS NOTES
Progress note   CHIEF COMPLAINT  Chief Complaint   Patient presents with   • Follow-up     POst Bronch    • Cough   • Shortness of Breath   • Wheezing        Primary Care Provider  Aaron Sandoval MD     Referring Provider  No ref. provider found    Patient Complaint    ICD-10-CM ICD-9-CM   1. Positive QuantiFERON-TB Gold test  R76.12 795.52   2. Ex-smoker  Z87.891 V15.82   3. Bronchiectasis without complication ([facility])  J47.9 494.0   4. Pulmonary emphysema, unspecified emphysema type ([facility])  J43.9 492.8   5. Hypoxia  R09.02 799.02   6. Pneumonia due to methicillin resistant Staphylococcus aureus (MRSA), unspecified laterality, unspecified part of lung ([facility])  J15.212 482.42   7. Pneumonia due to Pseudomonas species, unspecified laterality, unspecified part of lung ([facility])  J15.1 482.1            Subjective     {Problem List  Visit Diagnosis   Encounters  Notes  Medications  Labs  Result Review Imaging  Media :23}     History of Presenting Illness  Rafael Salcedo is a 76 y.o. male With a history of former smoking and shortness of breath and chronic cough, was seen by me recently and had significant bronchiectasis in addition to emphysema for which he recommended to get the bronchoscopy and the patient had the [facility] and came here for the follow-up.  Bronchoscopy showed Pseudomonas and also staph aureus resistant to oxacillin and AFB smear positive.  AFB was sent for the cultures and for IV ID to the [place] and the results are still pending and appear to be slow-growing.  Patient is on Acapella which is helping him.  And also the inhalations with Stiolto and albuterol and not helping him much.  He still very short of breath.  He is not able to take the inhalers very well.  Short of breath on any exertion with persistent cough and productive sputum and the Acapella helps him some.  He is a former smoker has a dog in the house and  retired .  Had a sleep study done and recommended the patient to get the AutoPap.  Other review the systems are noncontributory and the patient and the wife are somewhat nervous and anxious about the the TB results and hopefully we can get the results 1 way or the other and inform the patient.    I have personally reviewed the review of systems, past family, social, medical and surgical histories; and agree with their findings.      HISTORY    Family History   Problem Relation Age of Onset   • Cancer Mother    • Heart failure Father    • Cancer Brother    • Cancer Maternal Uncle    • Malig Hyperthermia Neg Hx         Social History     Socioeconomic History   • Marital status:    Tobacco Use   • Smoking status: Former Smoker     Packs/day: 0.50     Years: 50.00     Pack years: 25.00     Types: Cigarettes     Start date: 1971     Quit date: 8/15/2020     Years since quittin.2   • Smokeless tobacco: Never Used   • Tobacco comment: 1-6 cigg a day quit    Vaping Use   • Vaping Use: Never used   Substance and Sexual Activity   • Alcohol use: Not Currently   • Drug use: Never   • Sexual activity: Defer        Past Medical History:   Diagnosis Date   • Emphysema of lung (HCC)    • Seasonal allergies    • Shortness of breath    • Tremors of nervous system     essential tremors        Immunization History   Administered Date(s) Administered   • COVID-19 (MODERNA) 2021, 2021       No Known Allergies       Current Outpatient Medications:   •  ascorbic acid (VITAMIN C) 1000 MG tablet, Take 1,000 mg by mouth Daily., Disp: , Rfl:   •  Cholecalciferol (Vitamin D) 50 MCG (2000 UT) tablet, Take 2,000 Units by mouth Daily., Disp: , Rfl:   •  Fexofenadine-Pseudoephedrine (ALLEGRA-D PO), Take  by mouth Daily As Needed., Disp: , Rfl:   •  primidone (MYSOLINE) 250 MG tablet, Take 250 mg by mouth 2 (two) times a day., Disp: , Rfl:   •  Testosterone Cypionate (Depo-Testosterone) 200 MG/ML  injection, Inject 0.25 mL into the appropriate muscle as directed by prescriber Every 28 (Twenty-Eight) Days., Disp: , Rfl:   •  Turmeric (Curcumin 95) 500 MG capsule, Take  by mouth., Disp: , Rfl:   •  Zinc 50 MG tablet, Take  by mouth., Disp: , Rfl:   •  albuterol (PROVENTIL) (2.5 MG/3ML) 0.083% nebulizer solution, Take 2.5 mg by nebulization Every 4 (Four) Hours As Needed for Wheezing., Disp: 120 each, Rfl: 5  •  albuterol sulfate  (90 Base) MCG/ACT inhaler, Inhale 2 puffs Every 4 (Four) Hours As Needed for Wheezing., Disp: 1 g, Rfl: 5  •  budesonide (Pulmicort) 0.5 MG/2ML nebulizer solution, Take 2 mL by nebulization 2 (Two) Times a Day for 30 days., Disp: 60 each, Rfl: 5  •  formoterol (PERFOROMIST) 20 MCG/2ML nebulizer solution, Take 2 mL by nebulization 2 (Two) Times a Day., Disp: 60 each, Rfl: 5  •  Lactobacillus Extra Strength capsule, Take 1 capsule by mouth 2 (Two) Times a Day for 30 days., Disp: 60 each, Rfl: 1  •  levoFLOXacin (Levaquin) 750 MG tablet, Take 1 tablet by mouth Daily., Disp: 1 tablet, Rfl: 15  •  revefenacin (YUPELRI) 175 MCG/3ML nebulizer solution, Take 3 mL by nebulization Daily., Disp: 90 mL, Rfl: 5  •  sulfamethoxazole-trimethoprim (BACTRIM DS,SEPTRA DS) 800-160 MG per tablet, Take 1 tablet by mouth 2 (Two) Times a Day for 15 days., Disp: 30 tablet, Rfl: 0     Smoking status: Former    Objective     Vital Signs:   Vitals:    10/29/21 0915   BP: 155/77   Pulse: 71   Resp: 18   Temp: 98.2 °F (36.8 °C)   SpO2: 92%        Physical Exam  Very pleasant gentleman.  Alert and oriented x3 and somewhat anxious.  HEENT: Patient using the face mask for COVID-19 precautions.  Neck: Supple, no JVD no masses palpable.  Chest and lungs: Decreased breath sounds with bilateral scattered rhonchi.  Cardiovascular system: Regular rate and rhythm.  Abdomen: Soft and benign  Extremities: No edema.  Central nervous system grossly intact  Result Review :   I have personally reviewed the lab data which  is available and as documented and patient had the sleep study done which showed respiratory event index of 14.5 events per hour.         Impression:  Bronchial washing grew staph aureus which is resistant to oxacillin/mecA/C and MREJ Gene positive  Pseudomonas  AFB smear positive.  Moderately severe obstructive sleep apnea with excessive daytime sleepiness and feeling tired.  Bronchiectasis.    Hypoxemia.  Emphysema.  Having difficulty of taking the inhalation therapy and not working very well and the DPI is low        Plan:  Reviewed the results with the patient and comforted the patient and his wife stating that the ABIs is a slow-growing and if it did not grow that means most likely this may be contaminated but cannot be sure we will wait for the results and will inform the patient if necessary  Changing the inhalers to the nebulizer therapy with the Perforomist, budesonide, Yupelri and albuterol nebulizer.  Continue the Acapella.  Steam inhalation.  Bactrim  Levaquin for 2 weeks and depending upon the situation we may need to repeat the the sputum for Gram culture and sensitivity as well and if the patient continues to have the problem may need to consider above daptomycin versus vancomycin etc. and other related.  Give the prescription for the lactobacillus as well.  If necessary may need to be treated as an outpatient IV therapy  CT scan of the chest to assess the bronchiectasis post bronchoscopy and treating with the infections  If the patient is getting any worse patient should go to the emergency room and call our office and primary physician.  Follow Up   Return in about 4 weeks (around 11/26/2021).  Patient was given instructions and counseling regarding his condition or for health maintenance advice. Please see specific information pulled into the AVS if appropriate.     Henri Matthew MD

## 2021-11-11 LAB
MYCOBACTERIUM SPEC CULT: ABNORMAL
MYCOBACTERIUM SPEC CULT: ABNORMAL
NIGHT BLUE STAIN TISS: ABNORMAL
NIGHT BLUE STAIN TISS: ABNORMAL

## 2021-12-01 ENCOUNTER — HOSPITAL ENCOUNTER (OUTPATIENT)
Dept: CT IMAGING | Facility: HOSPITAL | Age: 76
Discharge: HOME | End: 2021-12-01
Admitting: SPECIALIST

## 2021-12-01 DIAGNOSIS — Z87.891 EX-SMOKER: ICD-10-CM

## 2021-12-01 DIAGNOSIS — J43.2 CENTRILOBULAR EMPHYSEMA (HCC): ICD-10-CM

## 2021-12-01 PROCEDURE — 71250 CT THORAX DX C-: CPT

## 2021-12-02 ENCOUNTER — OFFICE VISIT (OUTPATIENT)
Dept: PULMONOLOGY | Facility: CLINIC | Age: 76
End: 2021-12-02

## 2021-12-02 VITALS
HEIGHT: 70 IN | DIASTOLIC BLOOD PRESSURE: 75 MMHG | RESPIRATION RATE: 14 BRPM | WEIGHT: 190 LBS | HEART RATE: 65 BPM | TEMPERATURE: 98.6 F | BODY MASS INDEX: 27.2 KG/M2 | SYSTOLIC BLOOD PRESSURE: 153 MMHG | OXYGEN SATURATION: 94 %

## 2021-12-02 DIAGNOSIS — J43.9 PULMONARY EMPHYSEMA, UNSPECIFIED EMPHYSEMA TYPE (HCC): Primary | ICD-10-CM

## 2021-12-02 DIAGNOSIS — J47.1 BRONCHIECTASIS WITH ACUTE EXACERBATION (HCC): ICD-10-CM

## 2021-12-02 DIAGNOSIS — IMO0001 LUNG NODULE < 6CM ON CT: ICD-10-CM

## 2021-12-02 DIAGNOSIS — J15.1 PNEUMONIA DUE TO PSEUDOMONAS SPECIES, UNSPECIFIED LATERALITY, UNSPECIFIED PART OF LUNG (HCC): ICD-10-CM

## 2021-12-02 PROCEDURE — 99213 OFFICE O/P EST LOW 20 MIN: CPT | Performed by: SPECIALIST

## 2021-12-02 NOTE — PATIENT INSTRUCTIONS
Bronchiectasis    Bronchiectasis is a condition in which the airways in the lungs (bronchi) are damaged and widened. The condition makes it hard for the lungs to get rid of mucus, and it causes mucus to gather in the bronchi. This condition often leads to lung infections, which can make the condition worse.  What are the causes?  You can be born with this condition or you can develop it later in life. Common causes of this condition include:  · Cystic fibrosis.  · Repeated lung infections, such as pneumonia or tuberculosis.  · An object or other blockage in the lungs.  · Breathing in fluid, food, or other objects (aspiration).  · A problem with the immune system and lung structure that is present at birth (congenital).  Sometimes the cause is not known.  What are the signs or symptoms?  Common symptoms of this condition include:  · A daily cough that brings up mucus and lasts for more than 3 weeks.  · Lung infections that happen often.  · Shortness of breath and wheezing.  · Weakness and fatigue.  How is this diagnosed?  This condition is diagnosed with tests, such as:  · Chest X-rays or CT scans. These are done to check for changes in the lungs.  · Breathing tests. These are done to check how well your lungs are working.  · A test of a sample of your saliva (sputum culture). This test is done to check for infection.  · Blood tests and other tests. These are done to check for related diseases or causes.  How is this treated?  Treatment for this condition depends on the severity of the illness and its cause. Treatment may include:  · Medicines that loosen mucus so it can be coughed up (expectorants).  · Medicines that relax the muscles of the bronchi (bronchodilators).  · Antibiotic medicines to prevent or treat infection.  · Physical therapy to help clear mucus from the lungs. Techniques may include:  ? Postural drainage. This is when you sit or lie in certain positions so that mucus can drain by gravity.  ? Chest  percussion. This involves tapping the chest or back with a cupped hand.  ? Chest vibration. For this therapy, a hand or special equipment vibrates your chest and back.  · Surgery to remove the affected part of the lung. This may be done in severe cases.  Follow these instructions at home:  Medicines  · Take over-the-counter and prescription medicines only as told by your health care provider.  · If you were prescribed an antibiotic medicine, take it as told by your health care provider. Do not stop taking the antibiotic even if you start to feel better.  · Avoid taking sedatives and antihistamines unless your health care provider tells you to take them. These medicines tend to thicken the mucus in the lungs.  Managing symptoms  · Perform breathing exercises or techniques to clear your lungs as told by your health care provider.  · Consider using a cold steam vaporizer or humidifier in your room or home to help loosen secretions.  · If you have a cough that gets worse at night, try sleeping in a semi-upright position.  General instructions  · Get plenty of rest.  · Drink enough fluid to keep your urine clear or pale yellow.  · Stay inside when pollution and ozone levels are high.  · Stay up to date with vaccinations and immunizations.  · Avoid cigarette smoke and other lung irritants.  · Do not use any products that contain nicotine or tobacco, such as cigarettes and e-cigarettes. If you need help quitting, ask your health care provider.  · Keep all follow-up visits as told by your health care provider. This is important.  Contact a health care provider if:  · You cough up more sputum than before and the sputum is yellow or green in color.  · You have a fever.  · You cannot control your cough and are losing sleep.  Get help right away if:  · You cough up blood.  · You have chest pain.  · You have increasing shortness of breath.  · You have pain that gets worse or is not controlled with medicines.  · You have a fever  and your symptoms suddenly get worse.  Summary  · Bronchiectasis is a condition in which the airways in the lungs (bronchi) are damaged and widened. The condition makes it hard for the lungs to get rid of mucus, and it causes mucus to gather in the bronchi.  · Treatment usually includes therapy to help clear mucus from the lungs.  · Stay up to date with vaccinations and immunizations.  This information is not intended to replace advice given to you by your health care provider. Make sure you discuss any questions you have with your health care provider.  Document Revised: 11/30/2018 Document Reviewed: 01/22/2018  [employer] Patient Education © 2021 [employer]    Community-Acquired Pneumonia, Adult  Pneumonia is an infection of the lungs. It causes irritation and swelling in the airways of the lungs. Mucus and fluid may also build up inside the airways. This may cause coughing and trouble breathing.  One type of pneumonia can happen while you are in a hospital. A different type can happen when you are not in a hospital (community-acquired pneumonia).  What are the causes?    This condition is caused by germs (viruses, bacteria, or fungi). Some types of germs can spread from person to person. Pneumonia is not thought to spread from person to person.  What increases the risk?  You are more likely to develop this condition if:  · You have a long-term (chronic) disease, such as:  ? Disease of the lungs. This may be chronic obstructive pulmonary disease (COPD) or asthma.  ? Heart failure.  ? Cystic fibrosis.  ? Diabetes.  ? Kidney disease.  ? Sickle cell disease.  ? HIV.  · You have other health problems, such as:  ? Your body's defense system (immune system) is weak.  ? A condition that may cause you to breathe in fluids from your mouth and nose.  · You had your spleen taken out.  · You do not take good care of your teeth and mouth (poor dental hygiene).  · You use or have used tobacco products.  · You travel where the  germs that cause this illness are common.  · You are near certain animals or the places they live.  · You are older than 65 years of age.  What are the signs or symptoms?  Symptoms of this condition include:  · A cough.  · A fever.  · Sweating or chills.  · Chest pain, often when you breathe deeply or cough.  · Breathing problems, such as:  ? Fast breathing.  ? Trouble breathing.  ? Shortness of breath.  · Feeling tired (fatigued).  · Muscle aches.  How is this treated?  Treatment for this condition depends on many things, such as:  · The cause of your illness.  · Your medicines.  · Your other health problems.  Most adults can be treated at home. Sometimes, treatment must happen in a hospital.  · Treatment may include medicines to kill germs.  · Medicines may depend on which germ caused your illness.  Very bad pneumonia is rare. If you get it, you may:  · Have a machine to help you breathe.  · Have fluid taken away from around your lungs.  Follow these instructions at home:  Medicines  · Take over-the-counter and prescription medicines only as told by your doctor.  · Take cough medicine only if you are losing sleep. Cough medicine can keep your body from taking mucus away from your lungs.  · If you were prescribed an antibiotic medicine, take it as told by your doctor. Do not stop taking the antibiotic even if you start to feel better.  Lifestyle         · Do not drink alcohol.  · Do not use any products that contain nicotine or tobacco, such as cigarettes, e-cigarettes, and chewing tobacco. If you need help quitting, ask your doctor.  · Eat a healthy diet. This includes a lot of vegetables, fruits, whole grains, low-fat dairy products, and low-fat (lean) protein.  General instructions    · Rest a lot. Sleep for at least 8 hours each night.  · Sleep with your head and neck raised. Put a few pillows under your head or sleep in a reclining chair.  · Return to your normal activities as told by your doctor. Ask your  doctor what activities are safe for you.  · Drink enough fluid to keep your pee (urine) pale yellow.  · If your throat is sore, rinse your mouth often with salt water. To make salt water, dissolve ½-1 tsp (3-6 g) of salt in 1 cup (237 mL) of warm water.  · Keep all follow-up visits as told by your doctor. This is important.    How is this prevented?  You can lower your risk of pneumonia by:  · Getting the pneumonia shot (vaccine). These shots have different types and schedules. Ask your doctor what works best for you. Think about getting this shot if:  ? You are older than 65 years of age.  ? You are 19-65 years of age and:  § You are being treated for cancer.  § You have long-term lung disease.  § You have other problems that affect your body's defense system. Ask your doctor if you have one of these.  · Getting your flu shot every year. Ask your doctor which type of shot is best for you.  · Going to the dentist as often as told.  · Washing your hands often with soap and water for at least 20 seconds. If you cannot use soap and water, use hand .  Contact a doctor if:  · You have a fever.  · You lose sleep because your cough medicine does not help.  Get help right away if:  · You are short of breath and this gets worse.  · You have more chest pain.  · Your sickness gets worse. This is very serious if:  ? You are an older adult.  ? Your body's defense system is weak.  · You cough up blood.  These symptoms may be an emergency. Do not wait to see if the symptoms will go away. Get medical help right away. Call your local emergency services (911 in the [place] Do not drive yourself to the hospital.  Summary  · Pneumonia is an infection of the lungs.  · Community-acquired pneumonia affects people who have not been in the hospital. Certain germs can cause this infection.  · This condition may be treated with medicines that kill germs.  · For very bad pneumonia, you may need a hospital stay and treatment to help  with breathing.  This information is not intended to replace advice given to you by your health care provider. Make sure you discuss any questions you have with your health care provider.  Document Revised: 09/29/2020 Document Reviewed: 09/29/2020  [employer] Patient Education © 2021 [employer]    Pulmonary Nodule  A pulmonary nodule is tissue that has grown on your lung. A nodule may be cancer, but most nodules are not cancer.  Follow these instructions at home:    · Take over-the-counter and prescription medicines only as told by your doctor.  · Do not use any products that have nicotine or tobacco, such as cigarettes and e-cigarettes. If you need help quitting, ask your doctor.  · Keep all follow-up visits as told by your doctor. This is important.  Contact a doctor if:  · You have trouble breathing when doing activities.  · You feel sick.  · You feel more tired than normal.  · You do not feel like eating.  · You lose weight without trying.  · You have chills.  · You have night sweats.  Get help right away if:  · You cannot catch your breath.  · You start making whistling sounds when breathing (wheezing).  · You cannot stop coughing.  · You cough up blood.  · You get dizzy.  · You feel like you are going to pass out (faint).  · You have sudden chest pain.  · You have a fever or symptoms for more than 2-3 days.  · You have a fever and your symptoms suddenly get worse.  Summary  · A pulmonary nodule is tissue that has grown on your lung.  · Most nodules are not cancer.  · Your doctor will do tests to know what kind of nodule you have, and whether you need treatment for it.  This information is not intended to replace advice given to you by your health care provider. Make sure you discuss any questions you have with your health care provider.  Document Revised: 01/11/2019 Document Reviewed: 01/16/2018  [employer] Patient Education © 2021 [employer]  What is Emphysema?  This video will teach you about the lung  disease emphysema and how it affects the body.  To view the content, go to this web address:  [email or web address]    This video will  on: 2023. If you need access to this video following this date, please reach out to the healthcare provider who assigned it to you.  This information is not intended to replace advice given to you by your health care provider. Make sure you discuss any questions you have with your health care provider.  [employer]’s editorial and clinical teams regularly review and update content to ensure it is up-to-date with changing practice standards and recognized medical guidelines.  Document Revised: 2021  [employer] Patient Education ©  [employer]

## 2021-12-02 NOTE — PROGRESS NOTES
Progress note    CHIEF COMPLAINT  Chief Complaint   Patient presents with   • Follow-up     3 mo f/u   • Emphysema   • Shortness of Breath   • Cough   • Wheezing   • Sleep Apnea        Primary Care Provider  Aaron Sandoval MD     Referring Provider  No ref. provider found    Patient Complaint    ICD-10-CM ICD-9-CM   1. Pulmonary emphysema, unspecified emphysema type ([facility])  J43.9 492.8   2. Lung nodule < 6cm on CT  R91.1 793.11   3. Bronchiectasis with acute exacerbation ([facility])  J47.1 494.1   4. Pneumonia due to Pseudomonas species, unspecified laterality, unspecified part of lung ([facility])  J15.1 482.1            Subjective          History of Presenting Illness  Rafael Salcedo is a 76 y.o. male was diagnosed of having the bronchiectasis and patient underwent the bronchoscopy and cleaned up and patient had the significant Pseudomonas and also possible MRSA and AFB smear positive.  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  Patient is feeling much better and doing well with the present medical therapy.  Patient received the antibiotic.  In spite of using the Acapella, percussion and drainage and stimulation and other related, patient continues to cough greenish phlegm every day.  Sometimes very difficult to get it out.  Patient is strictly following precautions to stay at home and getting isolated.  He is a  and has a dog in the house and a former smoker.  Patient denies of having any fever, chills, sweating, or any exposed to anybody who is sick around.  Denies any chest pain, palpitation or any other related diaphoretic symptoms.  Denied any loss of taste and smell and other related.  I have personally reviewed the review of systems, past family, social, medical and surgical histories; and agree with their findings.    HISTORY    Family History   Problem Relation Age of  Onset   • Cancer Mother    • Heart failure Father    • Cancer Brother    • Cancer Maternal Uncle    • Malig Hyperthermia Neg Hx         Social History     Socioeconomic History   • Marital status:    Tobacco Use   • Smoking status: Former Smoker     Packs/day: 0.50     Years: 50.00     Pack years: 25.00     Types: Cigarettes     Start date: 1971     Quit date: 8/15/2020     Years since quittin.2   • Smokeless tobacco: Never Used   • Tobacco comment: 1-6 cigg a day quit    Vaping Use   • Vaping Use: Never used   Substance and Sexual Activity   • Alcohol use: Not Currently   • Drug use: Never   • Sexual activity: Defer        Past Medical History:   Diagnosis Date   • Emphysema of lung (HCC)    • Seasonal allergies    • Shortness of breath    • Tremors of nervous system     essential tremors        Immunization History   Administered Date(s) Administered   • COVID-19 (MODERNA) 1st, 2nd, 3rd Dose Only 2021, 2021       No Known Allergies       Current Outpatient Medications:   •  albuterol (PROVENTIL) (2.5 MG/3ML) 0.083% nebulizer solution, Take 2.5 mg by nebulization Every 4 (Four) Hours As Needed for Wheezing., Disp: 120 each, Rfl: 5  •  albuterol sulfate  (90 Base) MCG/ACT inhaler, Inhale 2 puffs Every 4 (Four) Hours As Needed for Wheezing., Disp: 1 g, Rfl: 5  •  ascorbic acid (VITAMIN C) 1000 MG tablet, Take 1,000 mg by mouth Daily., Disp: , Rfl:   •  Cholecalciferol (Vitamin D) 50 MCG (2000 UT) tablet, Take 2,000 Units by mouth Daily., Disp: , Rfl:   •  formoterol (PERFOROMIST) 20 MCG/2ML nebulizer solution, Take 2 mL by nebulization 2 (Two) Times a Day., Disp: 60 each, Rfl: 5  •  primidone (MYSOLINE) 250 MG tablet, Take 250 mg by mouth 2 (two) times a day., Disp: , Rfl:   •  revefenacin (YUPELRI) 175 MCG/3ML nebulizer solution, Take 3 mL by nebulization Daily., Disp: 90 mL, Rfl: 5  •  Testosterone Cypionate (Depo-Testosterone) 200 MG/ML injection, Inject 0.25 mL into the  appropriate muscle as directed by prescriber Every 28 (Twenty-Eight) Days., Disp: , Rfl:   •  Turmeric (Curcumin 95) 500 MG capsule, Take  by mouth., Disp: , Rfl:   •  Zinc 50 MG tablet, Take  by mouth., Disp: , Rfl:   •  budesonide (Pulmicort) 0.5 MG/2ML nebulizer solution, Take 2 mL by nebulization 2 (Two) Times a Day for 30 days., Disp: 60 each, Rfl: 5  •  Fexofenadine-Pseudoephedrine (ALLEGRA-D PO), Take  by mouth Daily As Needed., Disp: , Rfl:   •  levoFLOXacin (Levaquin) 750 MG tablet, Take 1 tablet by mouth Daily., Disp: 1 tablet, Rfl: 15     Smoking status: Former    Objective     Vital Signs:   Vitals:    12/02/21 0816   BP: 153/75   Pulse: 65   Resp: 14   Temp: 98.6 °F (37 °C)   SpO2: 94%        Physical Exam:  Very pleasant gentleman, alert and oriented x3.  Answering questions appropriately well and accompanied by his wife.  She has multiple answers and try to answer as much as possible and she want to know about the vaccinations and any suspicion for the COVID-19 in spite of having the vaccinations.  Patient wants no other the patient can get the pneumonia shot at this time.  HEENT: Atraumatic, anicteric, patient is using the facemask for COVID-19 precautions.  Neck: Supple, no JVD, trachea central.  Chest and lungs: Nontender and scattered rhonchi bilaterally worse on expiration.  Cardiovascular system: Regular rate and rhythm and no murmurs appreciated.  Abdomen: Soft and nontender.  Extremities: No clubbing, no cyanosis, no edema.  Central nervous system: Grossly intact.  Skin no evidence of rash.  Result Review :   I have personally reviewed the previous medical records and the CT scan of the chest done on December 1, 2021 is reviewed which showed severe emphysema changes and no significant scar tissue.  There is some irregular appearing nodules in the superior segment of the left lower lobe and also some bronchiectatic changes and there is a largest measure 1.5 cm nodule is noted and recommends a  follow-up.  Other work-up and management is as reviewed.       Impression:  Former smoker  Bronchiectasis  AFB cultures grew Mycobacterium gordonae  Severe emphysema.  Patient is feeling much better with the present medications but continues to have the cough and productive sputum sometimes very difficult to get it out.  Other medical problems and management is being well done by primary attending physician.    Plan:  Continue the present bronchodilator therapy  As the patient a cappella is not working he and he still continues to have the problem of coughing of phlegm which sometimes thick, will evaluate for the vest therapy  We will get the sputum repeat with the smear and culture and sensitivity as the patient has the MRSA and Pseudomonas.  If the patient continues to have the problem, may need to consider to change the antibiotics for the appropriate therapy.  Any problem in between call us otherwise get the CT scan of the chest in 4 to 5 months and also vest therapy and follow-up as needed.  Patient education was given.      Follow Up   Return in about 4 months (around 4/2/2022).  Patient was given instructions and counseling regarding his condition or for health maintenance advice. Please see specific information pulled into the AVS if appropriate.     Henri Matthew MD

## 2021-12-03 ENCOUNTER — LAB (OUTPATIENT)
Dept: LAB | Facility: HOSPITAL | Age: 76
End: 2021-12-03

## 2021-12-03 DIAGNOSIS — IMO0001 LUNG NODULE < 6CM ON CT: ICD-10-CM

## 2021-12-03 DIAGNOSIS — J47.1 BRONCHIECTASIS WITH ACUTE EXACERBATION (HCC): ICD-10-CM

## 2021-12-03 DIAGNOSIS — J43.9 PULMONARY EMPHYSEMA, UNSPECIFIED EMPHYSEMA TYPE (HCC): ICD-10-CM

## 2021-12-03 DIAGNOSIS — J15.1 PNEUMONIA DUE TO PSEUDOMONAS SPECIES, UNSPECIFIED LATERALITY, UNSPECIFIED PART OF LUNG (HCC): ICD-10-CM

## 2021-12-03 PROCEDURE — 87205 SMEAR GRAM STAIN: CPT

## 2021-12-03 PROCEDURE — 87070 CULTURE OTHR SPECIMN AEROBIC: CPT

## 2021-12-06 LAB
BACTERIA SPT CULT: ABNORMAL
BACTERIA SPT CULT: ABNORMAL
GRAM STN SPT: ABNORMAL
OTHER ANTIBIOTIC SUSC ISLT: ABNORMAL
SQUAMOUS SPT QL MICRO: ABNORMAL
WBC SPT QL MICRO: ABNORMAL

## 2021-12-13 DIAGNOSIS — J47.0 BRONCHIECTASIS WITH ACUTE LOWER RESPIRATORY INFECTION (HCC): Primary | ICD-10-CM

## 2021-12-13 PROBLEM — J47.9 BRONCHIECTASIS WITHOUT COMPLICATION: Status: RESOLVED | Noted: 2021-09-17 | Resolved: 2021-12-13

## 2021-12-13 RX ORDER — SULFAMETHOXAZOLE AND TRIMETHOPRIM 800; 160 MG/1; MG/1
1 TABLET ORAL 2 TIMES DAILY
Qty: 28 TABLET | Refills: 0 | Status: SHIPPED | OUTPATIENT
Start: 2021-12-13 | End: 2021-12-27

## 2021-12-14 DIAGNOSIS — R09.02 HYPOXIA: ICD-10-CM

## 2021-12-14 DIAGNOSIS — G47.33 OSA (OBSTRUCTIVE SLEEP APNEA): Primary | ICD-10-CM

## 2021-12-20 ENCOUNTER — TELEPHONE (OUTPATIENT)
Dept: PULMONOLOGY | Facility: CLINIC | Age: 76
End: 2021-12-20

## 2021-12-23 ENCOUNTER — TELEPHONE (OUTPATIENT)
Dept: PULMONOLOGY | Facility: CLINIC | Age: 76
End: 2021-12-23

## 2021-12-23 NOTE — TELEPHONE ENCOUNTER
[facility] called stating Dr. Matthew ordered O2 to bled into cpap for patient.  [facility] states a cpap titration study needs to be done in order for insurance to cover for patient.  States overnight ox alone will not work for insurance approval.    Please advise

## 2021-12-28 ENCOUNTER — TELEPHONE (OUTPATIENT)
Dept: PULMONOLOGY | Facility: CLINIC | Age: 76
End: 2021-12-28

## 2021-12-28 NOTE — TELEPHONE ENCOUNTER
Elizabeth from [facility] is calling regarding an order that  put in for oxygen. She can be reached at [phone].

## 2021-12-29 ENCOUNTER — PROCEDURE VISIT (OUTPATIENT)
Dept: CARDIAC REHAB | Facility: HOSPITAL | Age: 76
End: 2021-12-29

## 2021-12-29 ENCOUNTER — HOSPITAL ENCOUNTER (OUTPATIENT)
Dept: RESPIRATORY THERAPY | Facility: HOSPITAL | Age: 76
Discharge: HOME | End: 2021-12-29

## 2021-12-29 DIAGNOSIS — J43.2 CENTRILOBULAR EMPHYSEMA (HCC): ICD-10-CM

## 2021-12-29 LAB
ARTERIAL PATENCY WRIST A: POSITIVE
BASE EXCESS BLDA CALC-SCNC: -1.9 MMOL/L (ref -2–2)
BDY SITE: ABNORMAL
COHGB MFR BLD: 0.7 % (ref 0–1.5)
FHHB: 6.5 % (ref 0–5)
HCO3 BLDA-SCNC: 23.5 MMOL/L (ref 22–26)
HGB BLDA-MCNC: 15.3 G/DL (ref 13.8–16.4)
INHALED O2 CONCENTRATION: 21 %
METHGB BLD QL: 0.2 % (ref 0–1.5)
MODALITY: ABNORMAL
OXYHGB MFR BLDV: 92.6 % (ref 94–99)
PCO2 BLDA: 42.5 MM HG (ref 35–45)
PH BLDA: 7.36 PH UNITS (ref 7.35–7.45)
PO2 BLD: 323 MM[HG] (ref 0–500)
PO2 BLDA: 67.8 MM HG (ref 80–100)
SAO2 % BLDCOA: 93.4 % (ref 95–99)

## 2021-12-29 PROCEDURE — 82805 BLOOD GASES W/O2 SATURATION: CPT | Performed by: SPECIALIST

## 2021-12-29 PROCEDURE — 94729 DIFFUSING CAPACITY: CPT | Performed by: INTERNAL MEDICINE

## 2021-12-29 PROCEDURE — 94761 N-INVAS EAR/PLS OXIMETRY MLT: CPT

## 2021-12-29 PROCEDURE — 94729 DIFFUSING CAPACITY: CPT

## 2021-12-29 PROCEDURE — 83050 HGB METHEMOGLOBIN QUAN: CPT | Performed by: SPECIALIST

## 2021-12-29 PROCEDURE — 94726 PLETHYSMOGRAPHY LUNG VOLUMES: CPT | Performed by: INTERNAL MEDICINE

## 2021-12-29 PROCEDURE — 94060 EVALUATION OF WHEEZING: CPT

## 2021-12-29 PROCEDURE — 94726 PLETHYSMOGRAPHY LUNG VOLUMES: CPT

## 2021-12-29 PROCEDURE — 36600 WITHDRAWAL OF ARTERIAL BLOOD: CPT | Performed by: SPECIALIST

## 2021-12-29 PROCEDURE — 94060 EVALUATION OF WHEEZING: CPT | Performed by: INTERNAL MEDICINE

## 2021-12-29 PROCEDURE — 82375 ASSAY CARBOXYHB QUANT: CPT | Performed by: SPECIALIST

## 2021-12-29 RX ORDER — ALBUTEROL SULFATE 2.5 MG/3ML
2.5 SOLUTION RESPIRATORY (INHALATION) ONCE
Status: COMPLETED | OUTPATIENT
Start: 2021-12-29 | End: 2021-12-29

## 2021-12-29 RX ADMIN — ALBUTEROL SULFATE 2.5 MG: 2.5 SOLUTION RESPIRATORY (INHALATION) at 09:36

## 2022-01-03 DIAGNOSIS — G47.33 OSA (OBSTRUCTIVE SLEEP APNEA): Primary | ICD-10-CM

## 2022-01-04 ENCOUNTER — TELEPHONE (OUTPATIENT)
Dept: PULMONOLOGY | Facility: CLINIC | Age: 77
End: 2022-01-04

## 2022-01-04 NOTE — TELEPHONE ENCOUNTER
Patient called and left voicemail to call him back - did not leave any detail as to what he needed.

## 2022-02-24 ENCOUNTER — TRANSCRIBE ORDERS (OUTPATIENT)
Dept: ADMINISTRATIVE | Facility: HOSPITAL | Age: 77
End: 2022-02-24

## 2022-02-24 DIAGNOSIS — J32.9 CHRONIC SINUSITIS, UNSPECIFIED LOCATION: Primary | ICD-10-CM

## 2022-02-25 ENCOUNTER — HOSPITAL ENCOUNTER (OUTPATIENT)
Dept: CT IMAGING | Facility: HOSPITAL | Age: 77
Discharge: HOME | End: 2022-02-25
Admitting: OTOLARYNGOLOGY

## 2022-02-25 DIAGNOSIS — J32.9 CHRONIC SINUSITIS, UNSPECIFIED LOCATION: ICD-10-CM

## 2022-02-25 PROCEDURE — 70486 CT MAXILLOFACIAL W/O DYE: CPT

## 2022-03-09 ENCOUNTER — HOSPITAL ENCOUNTER (OUTPATIENT)
Dept: CT IMAGING | Facility: HOSPITAL | Age: 77
Discharge: HOME | End: 2022-03-09
Admitting: SPECIALIST

## 2022-03-09 DIAGNOSIS — J47.1 BRONCHIECTASIS WITH ACUTE EXACERBATION: ICD-10-CM

## 2022-03-09 DIAGNOSIS — J43.9 PULMONARY EMPHYSEMA, UNSPECIFIED EMPHYSEMA TYPE: ICD-10-CM

## 2022-03-09 DIAGNOSIS — J15.1 PNEUMONIA DUE TO PSEUDOMONAS SPECIES, UNSPECIFIED LATERALITY, UNSPECIFIED PART OF LUNG: ICD-10-CM

## 2022-03-09 DIAGNOSIS — IMO0001 LUNG NODULE < 6CM ON CT: ICD-10-CM

## 2022-03-09 PROCEDURE — 71250 CT THORAX DX C-: CPT

## 2022-04-06 ENCOUNTER — OFFICE VISIT (OUTPATIENT)
Dept: PULMONOLOGY | Facility: CLINIC | Age: 77
End: 2022-04-06

## 2022-04-06 VITALS
WEIGHT: 193.6 LBS | DIASTOLIC BLOOD PRESSURE: 82 MMHG | OXYGEN SATURATION: 92 % | HEIGHT: 70 IN | HEART RATE: 69 BPM | TEMPERATURE: 97.8 F | RESPIRATION RATE: 18 BRPM | SYSTOLIC BLOOD PRESSURE: 149 MMHG | BODY MASS INDEX: 27.72 KG/M2

## 2022-04-06 DIAGNOSIS — R09.02 HYPOXIA: Primary | ICD-10-CM

## 2022-04-06 DIAGNOSIS — G47.33 OBSTRUCTIVE SLEEP APNEA: ICD-10-CM

## 2022-04-06 DIAGNOSIS — J43.9 PULMONARY EMPHYSEMA, UNSPECIFIED EMPHYSEMA TYPE: ICD-10-CM

## 2022-04-06 DIAGNOSIS — Z87.891 EX-SMOKER: ICD-10-CM

## 2022-04-06 DIAGNOSIS — R76.12 POSITIVE QUANTIFERON-TB GOLD TEST: ICD-10-CM

## 2022-04-06 DIAGNOSIS — J47.9 BRONCHIECTASIS WITHOUT COMPLICATION: ICD-10-CM

## 2022-04-06 PROCEDURE — 99214 OFFICE O/P EST MOD 30 MIN: CPT | Performed by: INTERNAL MEDICINE

## 2022-04-06 RX ORDER — FORMOTEROL FUMARATE 20 UG/2ML
20 SOLUTION RESPIRATORY (INHALATION)
Qty: 60 EACH | Refills: 5
Start: 2022-04-06 | End: 2022-12-02 | Stop reason: SDUPTHER

## 2022-04-06 RX ORDER — FORMOTEROL FUMARATE 20 UG/2ML
20 SOLUTION RESPIRATORY (INHALATION)
COMMUNITY
Start: 2021-10-29 | End: 2022-04-06 | Stop reason: SDUPTHER

## 2022-04-06 RX ORDER — CEPHALEXIN 500 MG/1
500 CAPSULE ORAL 3 TIMES DAILY
COMMUNITY
Start: 2022-01-12 | End: 2022-04-06 | Stop reason: SDUPTHER

## 2022-04-06 RX ORDER — FUROSEMIDE 20 MG/1
20 TABLET ORAL DAILY
COMMUNITY
Start: 2022-03-01

## 2022-04-06 RX ORDER — BUDESONIDE 0.5 MG/2ML
0.5 INHALANT ORAL
Qty: 60 EACH | Refills: 5 | Status: SHIPPED | OUTPATIENT
Start: 2022-04-06 | End: 2022-07-14

## 2022-04-06 RX ORDER — BUDESONIDE 0.5 MG/2ML
0.5 INHALANT ORAL
COMMUNITY
Start: 2021-10-29 | End: 2022-04-06 | Stop reason: SDUPTHER

## 2022-04-06 RX ORDER — AMOXICILLIN 875 MG/1
TABLET, COATED ORAL
COMMUNITY
Start: 2022-02-14 | End: 2022-04-06

## 2022-04-06 RX ORDER — TRIAMCINOLONE ACETONIDE 1 MG/G
CREAM TOPICAL
COMMUNITY
Start: 2022-03-29

## 2022-04-06 NOTE — PROGRESS NOTES
Primary Care Provider  Aaron Sandoval MD     Referring Provider  No ref. provider found     Chief Complaint  Follow-up and Emphysema    Subjective          Rafael Salcedo presents to [facility] PULMONARY & CRITICAL CARE MEDICINE  History of Present Illness  Rafael Salcedo is a 76 y.o. male patient with history of heavy smoking in past, 58-pack-year smoking, severe emphysema and bronchiectasis, history of staph aureus and Pseudomonas pneumonia, difficulty with airway clearance, obstructive sleep apnea.  He is here for follow-up.  Since his last office visit, patient has been on Perforomist, Pulmicort and Yupelri.  He has albuterol inhaler and nebulizer at home but rarely uses it.  He still has shortness of breath with heavy exertion but his cough has gone away.  He has slowed down with age but has been able to golf, mow his own grass.  He does have issues going up and down the stairs or doing heavy activities.  He has a smart vest at home which he uses twice a day.  He has no changes in weight or appetite.  He had repeat CT scan of the chest a week ago which was reviewed with him today.  He feels like he is doing better.  He is currently on CPAP machine.  He was started on CPAP machine 3 months ago.  DME company is [employer].  We are waiting for CPAP compliance data.  He is up-to-date on Covid vaccine, flu and pneumonia vaccine.  He had root canal treatment 1-1/2-month ago and was on antibiotics prophylactically.    Review of Systems     General:  No Fatigue, No Fever No weight loss or loss of appetite  HEENT: No dysphagia, No Visual Changes, no rhinorrhea  Respiratory:  + cough,+Dyspnea, intermittent mucoid phlegm, No Pleuritic Pain, no wheezing, no hemoptysis.  Cardiovascular: Denies chest pain, denies palpitations,+YUEN, No Chest Pressure  Gastrointestinal:  No Abdominal Pain, No Nausea, No Vomiting, No Diarrhea  Genitourinary:  No Dysuria, No Frequency, No Hesitancy  Musculoskeletal: No  muscle pain or swelling  Endocrine:  No Heat Intolerance, No Cold Intolerance  Hematologic:  No Bleeding, No Bruising  Psychiatric:  No Anxiety, No Depression  Neurologic:  No Confusion, no Dysarthria, No Headaches  Skin:  No Rash, No Open Wounds    Family History   Problem Relation Age of Onset   • Cancer Mother    • Heart failure Father    • Cancer Brother    • Cancer Maternal Uncle    • Malig Hyperthermia Neg Hx         Social History     Socioeconomic History   • Marital status:    Tobacco Use   • Smoking status: Former Smoker     Packs/day: 0.50     Years: 50.00     Pack years: 25.00     Types: Cigarettes     Start date: 1971     Quit date: 8/15/2020     Years since quittin.6   • Smokeless tobacco: Never Used   • Tobacco comment: 1-6 cigg a day quit    Vaping Use   • Vaping Use: Never used   Substance and Sexual Activity   • Alcohol use: Not Currently   • Drug use: Never   • Sexual activity: Defer        Past Medical History:   Diagnosis Date   • Emphysema of lung (HCC)    • Seasonal allergies    • Shortness of breath    • Tremors of nervous system     essential tremors        Immunization History   Administered Date(s) Administered   • COVID-19 (MODERNA) 1st, 2nd, 3rd Dose Only 2021, 2021   • COVID-19 (MODERNA) BOOSTER 2021, 2021, 2022   • Pneumococcal Conjugate 13-Valent (PCV13) 2022         No Known Allergies       Current Outpatient Medications:   •  albuterol sulfate  (90 Base) MCG/ACT inhaler, Inhale 2 puffs Every 4 (Four) Hours As Needed for Wheezing., Disp: 1 g, Rfl: 5  •  ascorbic acid (VITAMIN C) 1000 MG tablet, Take 1,000 mg by mouth Daily., Disp: , Rfl:   •  budesonide (PULMICORT) 0.5 MG/2ML nebulizer solution, Take 2 mL by nebulization Daily., Disp: 60 each, Rfl: 5  •  Cholecalciferol (Vitamin D) 50 MCG (2000 UT) tablet, Take 2,000 Units by mouth Daily., Disp: , Rfl:   •  formoterol (PERFOROMIST) 20 MCG/2ML nebulizer solution, Take 2 mL  "by nebulization 2 (Two) Times a Day., Disp: 60 each, Rfl: 5  •  furosemide (LASIX) 20 MG tablet, Take 20 mg by mouth Daily., Disp: , Rfl:   •  primidone (MYSOLINE) 250 MG tablet, Take 250 mg by mouth 2 (two) times a day., Disp: , Rfl:   •  revefenacin (YUPELRI) 175 MCG/3ML nebulizer solution, Take 3 mL by nebulization Daily., Disp: 90 mL, Rfl: 5  •  Testosterone Cypionate (DEPOTESTOTERONE CYPIONATE) 200 MG/ML injection, Inject 0.25 mL into the appropriate muscle as directed by prescriber Every 28 (Twenty-Eight) Days., Disp: , Rfl:   •  triamcinolone (KENALOG) 0.1 % cream, APPLY DAILY AS NEEDED, Disp: , Rfl:   •  Turmeric 500 MG capsule, Take  by mouth., Disp: , Rfl:   •  Zinc 50 MG tablet, Take  by mouth., Disp: , Rfl:   •  albuterol (PROVENTIL) (2.5 MG/3ML) 0.083% nebulizer solution, Take 2.5 mg by nebulization Every 4 (Four) Hours As Needed for Wheezing., Disp: 120 each, Rfl: 5  •  budesonide (Pulmicort) 0.5 MG/2ML nebulizer solution, Take 2 mL by nebulization 2 (Two) Times a Day for 30 days., Disp: 60 each, Rfl: 5  •  Fexofenadine-Pseudoephedrine (ALLEGRA-D PO), Take  by mouth Daily As Needed., Disp: , Rfl:      Objective   Vital Signs:   /82 (BP Location: Right arm, Patient Position: Sitting, Cuff Size: Adult)   Pulse 69   Temp 97.8 °F (36.6 °C) (Tympanic)   Resp 18   Ht 177.8 cm (70\")   Wt 87.8 kg (193 lb 9.6 oz)   SpO2 92% Comment: room air  BMI 27.78 kg/m²         Mallampatti classification : 1  Physical Exam  Vital Signs Reviewed  WDWN, Alert, NAD.   HEENT:  PERRL, EOMI.  OP, nares clear, no sinus tenderness  Neck:  Supple, no JVD, no thyromegaly  Lymph: no axillary, cervical, supraclavicular lymphadenopathy noted bilaterally  Chest:  good aeration, bilateral diminished breath sounds, no wheezing, crackles or rhonchi, resonant to percussion b/l  CV: RRR, no MGR, pulses 2+, equal.  Abd:  Soft, NT, ND, + BS, no HSM  EXT:  no clubbing, no cyanosis, No BLE edema  Neuro:  A&Ox3, CN grossly intact, no " focal deficits.  Skin: No rashes or lesions noted     Result Review :   The following data was reviewed by: Garrison Boyle MD on 04/06/2022:  Common labs    Common Labsle 9/3/21   WBC 7.77   Hemoglobin 15.3   Hematocrit 43.6   Platelets 255               CBC    CBC 9/3/21   WBC 7.77   RBC 4.63   Hemoglobin 15.3   Hematocrit 43.6   MCV 94.2   MCH 33.0   MCHC 35.1   RDW 13.2   Platelets 255           CBC w/diff    CBC w/Diff 9/3/21   WBC 7.77   RBC 4.63   Hemoglobin 15.3   Hematocrit 43.6   MCV 94.2   MCH 33.0   MCHC 35.1   RDW 13.2   Platelets 255   Neutrophil Rel % 67.2   Immature Granulocyte Rel % 0.6 (A)   Lymphocyte Rel % 19.8   Monocyte Rel % 7.1   Eosinophil Rel % 4.5   Basophil Rel % 0.8   (A) Abnormal value            Data reviewed: Radiologic studies CT scan of the chest from March 9, 2022 shows significant emphysema and bronchiectasis.  I reviewed the imaging myself.            Assessment and Plan    Diagnoses and all orders for this visit:    1. Hypoxia (Primary)  -     revefenacin (YUPELRI) 175 MCG/3ML nebulizer solution; Take 3 mL by nebulization Daily.  Dispense: 90 mL; Refill: 5  -     formoterol (PERFOROMIST) 20 MCG/2ML nebulizer solution; Take 2 mL by nebulization 2 (Two) Times a Day.  Dispense: 60 each; Refill: 5  -     budesonide (PULMICORT) 0.5 MG/2ML nebulizer solution; Take 2 mL by nebulization Daily.  Dispense: 60 each; Refill: 5    2. Pulmonary emphysema, unspecified emphysema type (HCC)  -     revefenacin (YUPELRI) 175 MCG/3ML nebulizer solution; Take 3 mL by nebulization Daily.  Dispense: 90 mL; Refill: 5  -     formoterol (PERFOROMIST) 20 MCG/2ML nebulizer solution; Take 2 mL by nebulization 2 (Two) Times a Day.  Dispense: 60 each; Refill: 5  -     budesonide (PULMICORT) 0.5 MG/2ML nebulizer solution; Take 2 mL by nebulization Daily.  Dispense: 60 each; Refill: 5    3. Bronchiectasis without complication (HCC)  -     revefenacin (YUPELRI) 175 MCG/3ML nebulizer solution; Take 3 mL by  nebulization Daily.  Dispense: 90 mL; Refill: 5  -     formoterol (PERFOROMIST) 20 MCG/2ML nebulizer solution; Take 2 mL by nebulization 2 (Two) Times a Day.  Dispense: 60 each; Refill: 5  -     budesonide (PULMICORT) 0.5 MG/2ML nebulizer solution; Take 2 mL by nebulization Daily.  Dispense: 60 each; Refill: 5    4. Obstructive sleep apnea    5. 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  I advised him to use albuterol inhaler prior to doing heavy activities.    COPD exacerbation action plan was discussed in detail.  Patient has not needed any antibiotics or steroids for her lung issues over the last several months.  The last time he had antibiotics was for prophylaxis for root canal treatment 1 and half month ago.    He had positive QuantiFERON gold test in past.  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  Bronchoscopy cultures were negative for Vail's disease.  Patient is much improved clinically and has not been treated for latent disease so far.    Difficulty with airway clearance: Continue with smart vest twice a day.  On bad days, he can use it up to 4 times a day.    Obstructive sleep apnea: Continue with CPAP machine.  We are reaching out to the DME company.  We will review the CPAP usage data once it is available.  Weight loss counseling was done.  Sleep hygiene was discussed in detail.    He does not smoke anymore.  He is up-to-date on flu vaccine, pneumonia vaccine and Covid vaccine including booster shot.    Follow Up   No follow-ups on file.  Patient was given instructions and counseling regarding his condition or for health maintenance advice. Please see specific information pulled into the AVS if appropriate.       Electronically signed by Garrison Boyle MD, 4/6/2022, 08:31 EDT.

## 2022-05-17 ENCOUNTER — TELEPHONE (OUTPATIENT)
Dept: PULMONOLOGY | Facility: CLINIC | Age: 77
End: 2022-05-17

## 2022-05-17 NOTE — TELEPHONE ENCOUNTER
Pt called asking if he is supposed to take his Pulmicort once or twice a day. Called pt back but was unable to reach him, left him a VM to return my call if he still has questions.

## 2022-06-03 ENCOUNTER — TELEPHONE (OUTPATIENT)
Dept: PULMONOLOGY | Facility: CLINIC | Age: 77
End: 2022-06-03

## 2022-06-03 NOTE — TELEPHONE ENCOUNTER
Patient left a voicemail stating his nebulizer mouth piece was not working correctly. Called patient and told him he can come into the office and get an express nebs mouth piece

## 2022-06-27 ENCOUNTER — TELEPHONE (OUTPATIENT)
Dept: PULMONOLOGY | Facility: CLINIC | Age: 77
End: 2022-06-27

## 2022-06-27 NOTE — TELEPHONE ENCOUNTER
Patients wife had questions on how to get us [employer] records and the best way to schedule a follow up once he is released. I told patients wife she could ask them to fax us their notes or we could call and request them, gave her our fax number and she stated she would ask them. Also explained to the wife the best option for a follow up appointment is to call us and set one up when he is discharged.

## 2022-06-27 NOTE — TELEPHONE ENCOUNTER
Patient's wife called in stating that her  is currently admitted to [facility] in [place], she stated that she would like a return phone call regarding her , please contact her at [phone] thank you.

## 2022-07-14 ENCOUNTER — OFFICE VISIT (OUTPATIENT)
Dept: PULMONOLOGY | Facility: CLINIC | Age: 77
End: 2022-07-14

## 2022-07-14 ENCOUNTER — HOSPITAL ENCOUNTER (OUTPATIENT)
Dept: GENERAL RADIOLOGY | Facility: HOSPITAL | Age: 77
Discharge: HOME | End: 2022-07-14
Admitting: NURSE PRACTITIONER

## 2022-07-14 VITALS
HEART RATE: 78 BPM | DIASTOLIC BLOOD PRESSURE: 71 MMHG | WEIGHT: 193 LBS | OXYGEN SATURATION: 88 % | HEIGHT: 70 IN | TEMPERATURE: 98.4 F | SYSTOLIC BLOOD PRESSURE: 131 MMHG | BODY MASS INDEX: 27.63 KG/M2 | RESPIRATION RATE: 18 BRPM

## 2022-07-14 DIAGNOSIS — R09.02 HYPOXIA: ICD-10-CM

## 2022-07-14 DIAGNOSIS — J47.9 BRONCHIECTASIS WITHOUT COMPLICATION: ICD-10-CM

## 2022-07-14 DIAGNOSIS — J43.9 PULMONARY EMPHYSEMA, UNSPECIFIED EMPHYSEMA TYPE: Primary | ICD-10-CM

## 2022-07-14 DIAGNOSIS — J15.1 PNEUMONIA DUE TO PSEUDOMONAS SPECIES, UNSPECIFIED LATERALITY, UNSPECIFIED PART OF LUNG: ICD-10-CM

## 2022-07-14 DIAGNOSIS — Z87.891 EX-SMOKER: ICD-10-CM

## 2022-07-14 DIAGNOSIS — J43.9 PULMONARY EMPHYSEMA, UNSPECIFIED EMPHYSEMA TYPE: ICD-10-CM

## 2022-07-14 PROCEDURE — 99213 OFFICE O/P EST LOW 20 MIN: CPT | Performed by: NURSE PRACTITIONER

## 2022-07-14 PROCEDURE — 71046 X-RAY EXAM CHEST 2 VIEWS: CPT

## 2022-07-14 RX ORDER — BUDESONIDE 0.5 MG/2ML
0.5 INHALANT ORAL 2 TIMES DAILY
Qty: 60 EACH | Refills: 5 | Status: SHIPPED | OUTPATIENT
Start: 2022-07-14 | End: 2022-12-02 | Stop reason: SDUPTHER

## 2022-07-14 RX ORDER — AZITHROMYCIN 500 MG/1
TABLET, FILM COATED ORAL
COMMUNITY
Start: 2022-06-29 | End: 2022-07-14

## 2022-07-14 RX ORDER — CEFDINIR 300 MG/1
CAPSULE ORAL
COMMUNITY
Start: 2022-06-29 | End: 2022-07-14

## 2022-07-14 RX ORDER — DOXYCYCLINE 100 MG/1
CAPSULE ORAL
COMMUNITY
Start: 2022-06-21

## 2022-07-14 RX ORDER — PREDNISONE 20 MG/1
TABLET ORAL
COMMUNITY
Start: 2022-06-29 | End: 2022-07-14

## 2022-07-14 NOTE — PROGRESS NOTES
Primary Care Provider  Aaron Sandoval MD   Referring Provider  No ref. provider found    Patient Complaint  Emphysema, Sleep Apnea, and Hospital Follow Up Visit (June 24-June 29)      SUBJECTIVE    History of Presenting Illness  Rafael Salcedo is a pleasant 76 y.o. male who presents to [facility] PULMONARY & CRITICAL CARE MEDICINE for hospital follow-up.  Mr. Salcedo is here with his wife today.  Patient recently had a stay at [facility] 06/27/2022 through 06/29/2022 for pneumonia. Patient states he was negative for COVID and influenza. He states he was very short of air. States he had been treated at  but was not better. States he was sent home with 02 and has completed the antibiotics and steroid he was sent home with. He continues to be on Peforomist, Pulmicort, Yulperi, and Albuterol. Patient states he is trying to go without his 02 but has saturation in the low 80's at times.Patient does have incentive spirometer and acapella flutter valve as well as a smart vest. Patient's DME is [employer].  Patient has a diagnosis of emphysema, bronchiectasi, obstructive sleep apnea with CPAP, and positive QuantiFERON TB Gold test.    Denies coughing, wheezing, headaches, chest pain, weight loss or hemoptysis. Denies fevers, chills and night sweats. Rafael Salcedo is able to perform ADLs without difficulties and denies any swollen glands/lymph nodes in the head or neck.    I have personally reviewed the review of systems, past family, social, medical and surgical histories; and agree with their findings.    Review of Systems   Constitutional: Negative.  Negative for chills, fatigue and fever.   HENT: Negative.    Eyes: Negative.    Respiratory: Positive for shortness of breath. Negative for cough and wheezing.    Cardiovascular: Negative.  Negative for chest pain, palpitations and leg swelling.   Musculoskeletal: Negative.    Skin: Negative.         Family History   Problem Relation Age of Onset    • Cancer Mother    • Heart failure Father    • Cancer Brother    • Cancer Maternal Uncle    • Malig Hyperthermia Neg Hx         Social History     Socioeconomic History   • Marital status:    Tobacco Use   • Smoking status: Former Smoker     Packs/day: 0.50     Years: 50.00     Pack years: 25.00     Types: Cigarettes     Start date: 1971     Quit date: 8/15/2020     Years since quittin.9   • Smokeless tobacco: Never Used   • Tobacco comment: 1-6 cigg a day quit    Vaping Use   • Vaping Use: Never used   Substance and Sexual Activity   • Alcohol use: Not Currently   • Drug use: Never   • Sexual activity: Defer        Past Medical History:   Diagnosis Date   • Emphysema of lung (HCC)    • Seasonal allergies    • Shortness of breath    • Tremors of nervous system     essential tremors        Immunization History   Administered Date(s) Administered   • COVID-19 (MODERNA) 1st, 2nd, 3rd Dose Only 2021, 2021   • COVID-19 (MODERNA) BOOSTER 2021, 2021, 2022   • Pneumococcal Conjugate 13-Valent (PCV13) 2022       No Known Allergies       Current Outpatient Medications:   •  albuterol (PROVENTIL) (2.5 MG/3ML) 0.083% nebulizer solution, Take 2.5 mg by nebulization Every 4 (Four) Hours As Needed for Wheezing., Disp: 120 each, Rfl: 5  •  albuterol sulfate  (90 Base) MCG/ACT inhaler, Inhale 2 puffs Every 4 (Four) Hours As Needed for Wheezing., Disp: 1 g, Rfl: 5  •  ascorbic acid (VITAMIN C) 1000 MG tablet, Take 1,000 mg by mouth Daily., Disp: , Rfl:   •  budesonide (PULMICORT) 0.5 MG/2ML nebulizer solution, Take 2 mL by nebulization 2 (Two) Times a Day., Disp: 60 each, Rfl: 5  •  Cholecalciferol (Vitamin D) 50 MCG (2000 UT) tablet, Take 2,000 Units by mouth Daily., Disp: , Rfl:   •  doxycycline (MONODOX) 100 MG capsule, , Disp: , Rfl:   •  Fexofenadine-Pseudoephedrine (ALLEGRA-D PO), Take  by mouth Daily As Needed., Disp: , Rfl:   •  formoterol (PERFOROMIST) 20  "MCG/2ML nebulizer solution, Take 2 mL by nebulization 2 (Two) Times a Day., Disp: 60 each, Rfl: 5  •  furosemide (LASIX) 20 MG tablet, Take 20 mg by mouth Daily., Disp: , Rfl:   •  nystatin (MYCOSTATIN) 100,000 unit/mL suspension, Take 5 mL by mouth 4 (Four) Times a Day., Disp: 280 mL, Rfl: 2  •  primidone (MYSOLINE) 250 MG tablet, Take 250 mg by mouth 2 (two) times a day., Disp: , Rfl:   •  revefenacin (YUPELRI) 175 MCG/3ML nebulizer solution, Take 3 mL by nebulization Daily., Disp: 90 mL, Rfl: 5  •  Testosterone Cypionate (DEPOTESTOTERONE CYPIONATE) 200 MG/ML injection, Inject 0.25 mL into the appropriate muscle as directed by prescriber Every 28 (Twenty-Eight) Days., Disp: , Rfl:   •  triamcinolone (KENALOG) 0.1 % cream, APPLY DAILY AS NEEDED, Disp: , Rfl:   •  Turmeric 500 MG capsule, Take  by mouth., Disp: , Rfl:   •  Zinc 50 MG tablet, Take  by mouth., Disp: , Rfl:        OBJECTIVE    Vital Signs   /71 (BP Location: Left arm, Patient Position: Sitting, Cuff Size: Adult)   Pulse 78   Temp 98.4 °F (36.9 °C)   Resp 18   Ht 177.8 cm (70\")   Wt 87.5 kg (193 lb)   SpO2 (!) 88%   BMI 27.69 kg/m²     Physical Exam  Vitals reviewed.   Constitutional:       Appearance: Normal appearance.   HENT:      Head: Normocephalic and atraumatic.      Nose: Nose normal.      Mouth/Throat:      Mouth: Mucous membranes are moist.      Pharynx: Oropharynx is clear.   Eyes:      Extraocular Movements: Extraocular movements intact.      Conjunctiva/sclera: Conjunctivae normal.      Pupils: Pupils are equal, round, and reactive to light.   Cardiovascular:      Rate and Rhythm: Normal rate and regular rhythm.      Pulses: Normal pulses.      Heart sounds: Normal heart sounds.   Pulmonary:      Effort: Pulmonary effort is normal. No respiratory distress.      Breath sounds: Normal breath sounds. No stridor. No wheezing, rhonchi or rales.   Abdominal:      General: Bowel sounds are normal.   Musculoskeletal:         General: " Normal range of motion.      Cervical back: Normal range of motion and neck supple.   Skin:     General: Skin is warm and dry.   Neurological:      General: No focal deficit present.      Mental Status: He is alert and oriented to person, place, and time.   Psychiatric:         Behavior: Behavior normal.          Results Review  I have personally reviewed the hospital records from [facility] and prior diagnostic imaging and office notes.       ASSESSMENT & PLAN    Patient Active Problem List   Diagnosis   • Obstructive sleep apnea   • Hypoxia   • Ex-smoker   • Pulmonary emphysema (HCC)   • Bronchiectasis without complication (HCC)   • Positive QuantiFERON-TB Gold test       Diagnoses and all orders for this visit:    1. Pulmonary emphysema, unspecified emphysema type (HCC) (Primary)  -     budesonide (PULMICORT) 0.5 MG/2ML nebulizer solution; Take 2 mL by nebulization 2 (Two) Times a Day.  Dispense: 60 each; Refill: 5  -     XR Chest PA & Lateral; Future    2. Bronchiectasis without complication (HCC)  -     budesonide (PULMICORT) 0.5 MG/2ML nebulizer solution; Take 2 mL by nebulization 2 (Two) Times a Day.  Dispense: 60 each; Refill: 5  -     XR Chest PA & Lateral; Future    3. Ex-smoker    4. Hypoxia  -     budesonide (PULMICORT) 0.5 MG/2ML nebulizer solution; Take 2 mL by nebulization 2 (Two) Times a Day.  Dispense: 60 each; Refill: 5    5. Pneumonia due to Pseudomonas species, unspecified laterality, unspecified part of lung (HCC)  -     XR Chest PA & Lateral; Future       Plan:  We will send patient over to get a chest x-ray to follow-up on his pneumonia for resolution.  To continue with Perforomist, Pulmicort, Yupelri, albuterol.  Patient instructed to rinse his mouth out after each use to prevent oral thrush.  Patient encouraged to wear his oxygen and titrate to keep his saturation above 92%.  Patient encouraged to continue use of incentive spirometer, Acapella and his smart vest.  We will notify patient  of chest x-ray when available.  Patient to follow-up in 6 weeks or sooner if needed    Smoking status: Former  Vaccination status: Up-to-date  Medications personally reviewed    Follow Up  Return in about 6 weeks (around 8/25/2022).    Patient was given instructions and counseling regarding his condition or for health maintenance advice. Please see specific information pulled into the AVS if appropriate.

## 2022-08-29 ENCOUNTER — OFFICE VISIT (OUTPATIENT)
Dept: PULMONOLOGY | Facility: CLINIC | Age: 77
End: 2022-08-29

## 2022-08-29 VITALS
HEIGHT: 70 IN | BODY MASS INDEX: 27.63 KG/M2 | WEIGHT: 193 LBS | RESPIRATION RATE: 16 BRPM | TEMPERATURE: 98.9 F | OXYGEN SATURATION: 94 % | DIASTOLIC BLOOD PRESSURE: 75 MMHG | SYSTOLIC BLOOD PRESSURE: 125 MMHG | HEART RATE: 76 BPM

## 2022-08-29 DIAGNOSIS — J47.9 BRONCHIECTASIS WITHOUT COMPLICATION: ICD-10-CM

## 2022-08-29 DIAGNOSIS — J43.9 PULMONARY EMPHYSEMA, UNSPECIFIED EMPHYSEMA TYPE: Primary | ICD-10-CM

## 2022-08-29 PROCEDURE — 99213 OFFICE O/P EST LOW 20 MIN: CPT | Performed by: NURSE PRACTITIONER

## 2022-08-29 RX ORDER — CEPHALEXIN 500 MG/1
CAPSULE ORAL
COMMUNITY
Start: 2022-07-19 | End: 2023-03-01

## 2022-08-29 NOTE — PROGRESS NOTES
Primary Care Provider  Aaron Sandoval MD   Referring Provider  No ref. provider found    Patient Complaint  Sleep Apnea, Emphysema, Shortness of Breath, Cough, Wheezing, and Follow-up (6 wk f/up )      SUBJECTIVE    History of Presenting Illness  Rafael Salcedo is a pleasant 77 y.o. male who presents to [facility] for 6-week follow-up appointment.  I last saw the patient 7/14/2022.  Patient is here with his spouse today.  Patient has a diagnosis of pulmonary emphysema, bronchiectasis.  Patient is doing well overall he states does get some shortness of air with exertion of moderate severity but recovers easily with rest.  Patient does have oxygen at home however he is does not feel he needs it.  Patient's last PFT and 6-minute walk was in summer 2021.  Denies dyspnea, coughing, wheezing, headaches, chest pain, weight loss or hemoptysis. Denies fevers, chills and night sweats. Rfaael Salcedo is able to perform ADLs without difficulties and denies any swollen glands/lymph nodes in the head or neck.  Patient continues to be on Perforomist, Yupelri, Pulmicort, albuterol.  Patient is using his incentive spirometer, flutter valve and chest vest.  Patient does have obstructive sleep apnea and does use a CPAP at night.  Patient's [employer] is [employer].    I have personally reviewed the review of systems, past family, social, medical and surgical histories; and agree with their findings.    Review of Systems   Constitutional: Negative.  Negative for chills, diaphoresis, fatigue, fever and unexpected weight change.   HENT: Negative.    Eyes: Negative.    Respiratory: Positive for shortness of breath. Negative for cough, choking and wheezing.    Cardiovascular: Negative.  Negative for chest pain, palpitations and leg swelling.   Musculoskeletal: Negative.    Skin: Negative.         Family History   Problem Relation Age of Onset   • Cancer Mother    • Heart failure  Father    • Cancer Brother    • Cancer Maternal Uncle    • Malig Hyperthermia Neg Hx         Social History     Socioeconomic History   • Marital status:    Tobacco Use   • Smoking status: Former Smoker     Packs/day: 0.50     Years: 50.00     Pack years: 25.00     Types: Cigarettes     Start date: 1971     Quit date: 8/15/2020     Years since quittin.0   • Smokeless tobacco: Never Used   • Tobacco comment: 1-6 cigg a day quit    Vaping Use   • Vaping Use: Never used   Substance and Sexual Activity   • Alcohol use: Not Currently   • Drug use: Never   • Sexual activity: Defer        Past Medical History:   Diagnosis Date   • Emphysema of lung (HCC)    • Seasonal allergies    • Shortness of breath    • Tremors of nervous system     essential tremors        Immunization History   Administered Date(s) Administered   • COVID-19 (MODERNA) 1st, 2nd, 3rd Dose Only 2021, 2021   • COVID-19 (MODERNA) BOOSTER 2021, 2021, 2022   • Pneumococcal Conjugate 13-Valent (PCV13) 2022       No Known Allergies       Current Outpatient Medications:   •  albuterol (PROVENTIL) (2.5 MG/3ML) 0.083% nebulizer solution, Take 2.5 mg by nebulization Every 4 (Four) Hours As Needed for Wheezing., Disp: 120 each, Rfl: 5  •  albuterol sulfate  (90 Base) MCG/ACT inhaler, Inhale 2 puffs Every 4 (Four) Hours As Needed for Wheezing., Disp: 1 g, Rfl: 5  •  ascorbic acid (VITAMIN C) 1000 MG tablet, Take 1,000 mg by mouth Daily., Disp: , Rfl:   •  budesonide (PULMICORT) 0.5 MG/2ML nebulizer solution, Take 2 mL by nebulization 2 (Two) Times a Day., Disp: 60 each, Rfl: 5  •  cephalexin (KEFLEX) 500 MG capsule, TAKE 1 CAPSULE BY MOUTH THREE TIMES DAILY FOR 7 DAYS, Disp: , Rfl:   •  Cholecalciferol (Vitamin D) 50 MCG (2000 UT) tablet, Take 2,000 Units by mouth Daily., Disp: , Rfl:   •  doxycycline (MONODOX) 100 MG capsule, , Disp: , Rfl:   •  Fexofenadine-Pseudoephedrine (ALLEGRA-D PO), Take  by mouth  "Daily As Needed., Disp: , Rfl:   •  formoterol (PERFOROMIST) 20 MCG/2ML nebulizer solution, Take 2 mL by nebulization 2 (Two) Times a Day., Disp: 60 each, Rfl: 5  •  furosemide (LASIX) 20 MG tablet, Take 20 mg by mouth Daily., Disp: , Rfl:   •  nystatin (MYCOSTATIN) 100,000 unit/mL suspension, Take 5 mL by mouth 4 (Four) Times a Day., Disp: 280 mL, Rfl: 2  •  primidone (MYSOLINE) 250 MG tablet, Take 250 mg by mouth 2 (two) times a day., Disp: , Rfl:   •  revefenacin (YUPELRI) 175 MCG/3ML nebulizer solution, Take 3 mL by nebulization Daily., Disp: 90 mL, Rfl: 5  •  Testosterone Cypionate (DEPOTESTOTERONE CYPIONATE) 200 MG/ML injection, Inject 0.25 mL into the appropriate muscle as directed by prescriber Every 28 (Twenty-Eight) Days., Disp: , Rfl:   •  triamcinolone (KENALOG) 0.1 % cream, APPLY DAILY AS NEEDED, Disp: , Rfl:   •  Turmeric 500 MG capsule, Take  by mouth., Disp: , Rfl:   •  Zinc 50 MG tablet, Take  by mouth., Disp: , Rfl:        OBJECTIVE    Vital Signs   /75 (BP Location: Right arm, Patient Position: Sitting, Cuff Size: Adult)   Pulse 76   Temp 98.9 °F (37.2 °C) (Temporal)   Resp 16   Ht 177.8 cm (70\")   Wt 87.5 kg (193 lb)   SpO2 94% Comment: room air  BMI 27.69 kg/m²     Physical Exam  Vitals reviewed.   Constitutional:       General: He is not in acute distress.     Appearance: Normal appearance. He is not ill-appearing.   HENT:      Head: Normocephalic and atraumatic.      Nose: Nose normal.      Mouth/Throat:      Mouth: Mucous membranes are moist.      Pharynx: Oropharynx is clear.   Eyes:      Extraocular Movements: Extraocular movements intact.      Conjunctiva/sclera: Conjunctivae normal.      Pupils: Pupils are equal, round, and reactive to light.   Cardiovascular:      Rate and Rhythm: Normal rate and regular rhythm.      Pulses: Normal pulses.      Heart sounds: Normal heart sounds.   Pulmonary:      Effort: Pulmonary effort is normal. No respiratory distress.      Breath " sounds: Normal breath sounds. No stridor. No wheezing, rhonchi or rales.   Abdominal:      General: Bowel sounds are normal.   Musculoskeletal:         General: Normal range of motion.      Cervical back: Normal range of motion and neck supple.      Right lower leg: No edema.      Left lower leg: No edema.   Skin:     General: Skin is warm and dry.   Neurological:      General: No focal deficit present.      Mental Status: He is alert and oriented to person, place, and time.   Psychiatric:         Mood and Affect: Mood normal.         Behavior: Behavior normal.          Results Review  I have personally reviewed the pulmonary function test from 6/29/2021 and test x-ray 7/14/2022:    Results  Pulmonary Function Test (Order [id number])      Order-Level Documents:    Scan on 12/29/2021 by Henri Matthew MD: PULMONARY FUNCTION TEST, [facility], 12/29/2021         Author: -- Service: -- Author Type: --   Filed:  Date of Service:  Creation Time:    Status: (Other)   Spirometry  FEV1/FVC 39%  FEV1 43% of predicted  FVC 80% of predicted  There is a significant response to bronchodilator therapy seen        Lung volumes  Total lung capacity 117% of predicted  Residual volume 141% of predicted  Expiratory reserve volume 170% of predicted        Diffusion  DLCO is 27% of predicted        Interpretation  Spirometry shows severe obstructive defect with a significant response to bronchodilator therapy consistent with COPD with asthma overlap syndrome  There is a significant response to bronchodilator therapy seen  Lung volumes show air trapping and hyperinflation  DLCO is severely reduced        XR Chest PA & Lateral [[id number]] (Order [id number])  Order  Status: Final result                     Appointment Information      PACS Images     Radiology Images    Study Result    Narrative & Impression   PROCEDURE:  XR CHEST PA AND LATERAL     COMPARISON: [facility], CT, CT CHEST HI RESOLUTION DIAGNOSTIC, 3/09/2022,  9:01.     INDICATIONS:  PNEUMONIA FOLLOW-UP     FINDINGS:          There are emphysematous changes.  There are some interstitial changes in lower lungs that could be   reflective of more chronic change.  There are no pleural effusions.  The trachea is in the midline.    The heart is not enlarged.     IMPRESSION:                 1. COPD  2. There is some prominence of markings within the interstitium of the lower lungs that could be   reflective of more chronic change.            LILA LEWIS MD         Electronically Signed and Approved By: LILA LEWIS MD on 7/14/2022 at 17:13            ASSESSMENT & PLAN    Patient Active Problem List   Diagnosis   • Obstructive sleep apnea   • Hypoxia   • Ex-smoker   • Pulmonary emphysema (HCC)   • Bronchiectasis without complication (HCC)   • Positive QuantiFERON-TB Gold test       Diagnoses and all orders for this visit:    1. Pulmonary emphysema, unspecified emphysema type (HCC) (Primary)  -     Pulmonary Function Test; Future  -     6 Minute Walk Test; Future    2. Bronchiectasis without complication (HCC)  -     Pulmonary Function Test; Future  -     6 Minute Walk Test; Future           Plan:  At this time we will schedule patient to have a pulmonary function test and a 6-minute walk.  Patient will not return his oxygen at this time. Encouraged patient to keep it on hand in case his saturation does drop below 88%.  Due to the status of his lungs, and emphysema.  Patient to follow-up at his next scheduled appointment with Dr. Boyle in November return to office sooner if needed.  Patient to continue with his current medications nebulizers performance Lolai Pulmicort albuterol.  Patient encouraged to continue using his incentive spirometer, flutter valve, chest vest.  Patient to continue with his CPAP machine at recommended settings.    Smoking status:former  Vaccination status:up to date  Medications personally reviewed    Follow Up  Return for Next scheduled follow  up.    Patient was given instructions and counseling regarding his condition or for health maintenance advice. Please see specific information pulled into the AVS if appropriate.

## 2022-09-30 ENCOUNTER — PROCEDURE VISIT (OUTPATIENT)
Dept: CARDIAC REHAB | Facility: HOSPITAL | Age: 77
End: 2022-09-30

## 2022-09-30 ENCOUNTER — HOSPITAL ENCOUNTER (OUTPATIENT)
Dept: RESPIRATORY THERAPY | Facility: HOSPITAL | Age: 77
Discharge: HOME | End: 2022-09-30

## 2022-09-30 DIAGNOSIS — J47.9 BRONCHIECTASIS WITHOUT COMPLICATION: ICD-10-CM

## 2022-09-30 DIAGNOSIS — J43.9 PULMONARY EMPHYSEMA, UNSPECIFIED EMPHYSEMA TYPE: ICD-10-CM

## 2022-09-30 PROCEDURE — 94618 PULMONARY STRESS TESTING: CPT

## 2022-09-30 PROCEDURE — 94729 DIFFUSING CAPACITY: CPT

## 2022-09-30 PROCEDURE — 94060 EVALUATION OF WHEEZING: CPT

## 2022-09-30 PROCEDURE — 94060 EVALUATION OF WHEEZING: CPT | Performed by: INTERNAL MEDICINE

## 2022-09-30 PROCEDURE — 94726 PLETHYSMOGRAPHY LUNG VOLUMES: CPT | Performed by: INTERNAL MEDICINE

## 2022-09-30 PROCEDURE — 94729 DIFFUSING CAPACITY: CPT | Performed by: INTERNAL MEDICINE

## 2022-09-30 PROCEDURE — 94726 PLETHYSMOGRAPHY LUNG VOLUMES: CPT

## 2022-09-30 RX ORDER — ALBUTEROL SULFATE 2.5 MG/3ML
2.5 SOLUTION RESPIRATORY (INHALATION) ONCE
Status: COMPLETED | OUTPATIENT
Start: 2022-09-30 | End: 2022-09-30

## 2022-09-30 RX ADMIN — ALBUTEROL SULFATE 2.5 MG: 2.5 SOLUTION RESPIRATORY (INHALATION) at 07:50

## 2022-10-17 ENCOUNTER — TELEPHONE (OUTPATIENT)
Dept: PULMONOLOGY | Facility: CLINIC | Age: 77
End: 2022-10-17

## 2022-10-17 NOTE — TELEPHONE ENCOUNTER
Patient had called and left a voicemail in regards to his nebulizer not working properly and he had just received it this year. I returned patients voicemail and gave him the number on the nebulizer bag to call and discuss with them what as going on with it. I also informed him that his new follow up appointment was scheduled 11/14/2022 at 9:45 with Dr. Boyle since he is out of office. I asked patient if he needed to be seen sooner or if that appointment was okay. He said that appointment would be fine. I told patient if he had any other questions to not hesitate to call us.

## 2022-11-04 NOTE — PROGRESS NOTES
Primary Care Provider  Aaron Sandoval MD   Referring Provider  No ref. provider found    Patient Complaint  Emphysema, Bronchitis, Shortness of Breath, Follow-up, and Results      SUBJECTIVE    History of Presenting Illness  Rafael Salcedo is a pleasant 77 y.o. male who presents to [facility] for followup appointment on PFT results. Patient is here with spouse today.  Patient has a diagnosis of severe emphysema bronchiectasis and obstructive sleep apnea with CPAP machine.  At present time patient's O2 saturation is 90 to 92% on room air.  Patient does have oxygen in the vehicle.  Patient does not like to wear his oxygen all the time.  Patient continues to be on Yupelri, Proventil, Pulmicort, albuterol inhaler, Allegra.  Patient did have a pulmonary function test done 9/30/2022.  In addition patient does have obstructive sleep apnea and is wearing a CPAP machine but he quit wearing it 3 nights ago due to breakout on his face.  He went by his dermatologist they gave him some cream but its not any better patient states he was told it was dry skin.  Patient has used Vaseline on it and it did not improve with Vaseline either.  Patient is due for flu vaccine but wants to wait until December before getting his flu vaccine.  He is up-to-date with his COVID and pneumonia.  Patient does have shortness of air with minimal exertion.  Patient is interested in going to pulmonary rehab at this time.    Denies coughing, wheezing, headaches, chest pain, weight loss or hemoptysis. Denies fevers, chills and night sweats. Rafael Salcedo is able to perform ADLs without difficulties and denies any swollen glands/lymph nodes in the head or neck.    I have personally reviewed the review of systems, past family, social, medical and surgical histories; and agree with their findings.    Review of Systems   Constitutional: Negative.  Negative for chills, diaphoresis, fatigue, fever  and unexpected weight change.   HENT: Negative.    Eyes: Negative.    Respiratory: Positive for shortness of breath. Negative for cough and wheezing.    Cardiovascular: Negative.  Negative for chest pain, palpitations and leg swelling.   Musculoskeletal: Negative.    Skin: Negative.         Family History   Problem Relation Age of Onset   • Cancer Mother    • Heart failure Father    • Cancer Brother    • Cancer Maternal Uncle    • Malig Hyperthermia Neg Hx         Social History     Socioeconomic History   • Marital status:    Tobacco Use   • Smoking status: Former     Packs/day: 0.50     Years: 50.00     Pack years: 25.00     Types: Cigarettes     Start date: 1971     Quit date: 8/15/2020     Years since quittin.2   • Smokeless tobacco: Never   • Tobacco comments:     1-6 cigg a day quit    Vaping Use   • Vaping Use: Never used   Substance and Sexual Activity   • Alcohol use: Not Currently   • Drug use: Never   • Sexual activity: Defer        Past Medical History:   Diagnosis Date   • Emphysema of lung (HCC)    • Seasonal allergies    • Shortness of breath    • Tremors of nervous system     essential tremors        Immunization History   Administered Date(s) Administered   • COVID-19 (MODERNA) 1st, 2nd, 3rd Dose Only 2021, 2021   • COVID-19 (MODERNA) BOOSTER 2021, 2021, 2022   • Pneumococcal Conjugate 13-Valent (PCV13) 2022       No Known Allergies       Current Outpatient Medications:   •  albuterol (PROVENTIL) (2.5 MG/3ML) 0.083% nebulizer solution, Take 2.5 mg by nebulization Every 4 (Four) Hours As Needed for Wheezing., Disp: 120 each, Rfl: 5  •  albuterol sulfate  (90 Base) MCG/ACT inhaler, Inhale 2 puffs Every 4 (Four) Hours As Needed for Wheezing., Disp: 1 g, Rfl: 5  •  ascorbic acid (VITAMIN C) 1000 MG tablet, Take 1,000 mg by mouth Daily., Disp: , Rfl:   •  budesonide (PULMICORT) 0.5 MG/2ML nebulizer solution, Take 2 mL by nebulization 2 (Two)  "Times a Day., Disp: 60 each, Rfl: 5  •  Cholecalciferol (Vitamin D) 50 MCG (2000 UT) tablet, Take 2,000 Units by mouth Daily., Disp: , Rfl:   •  Fexofenadine-Pseudoephedrine (ALLEGRA-D PO), Take  by mouth Daily As Needed., Disp: , Rfl:   •  formoterol (PERFOROMIST) 20 MCG/2ML nebulizer solution, Take 2 mL by nebulization 2 (Two) Times a Day., Disp: 60 each, Rfl: 5  •  furosemide (LASIX) 20 MG tablet, Take 20 mg by mouth Daily., Disp: , Rfl:   •  primidone (MYSOLINE) 250 MG tablet, Take 250 mg by mouth 2 (two) times a day., Disp: , Rfl:   •  revefenacin (YUPELRI) 175 MCG/3ML nebulizer solution, Take 3 mL by nebulization Daily., Disp: 90 mL, Rfl: 5  •  Testosterone Cypionate (DEPOTESTOTERONE CYPIONATE) 200 MG/ML injection, Inject 0.25 mL into the appropriate muscle as directed by prescriber Every 28 (Twenty-Eight) Days., Disp: , Rfl:   •  triamcinolone (KENALOG) 0.1 % cream, APPLY DAILY AS NEEDED, Disp: , Rfl:   •  Turmeric 500 MG capsule, Take  by mouth., Disp: , Rfl:   •  Zinc 50 MG tablet, Take  by mouth., Disp: , Rfl:   •  cephalexin (KEFLEX) 500 MG capsule, TAKE 1 CAPSULE BY MOUTH THREE TIMES DAILY FOR 7 DAYS, Disp: , Rfl:   •  doxycycline (MONODOX) 100 MG capsule, , Disp: , Rfl:   •  nystatin (MYCOSTATIN) 100,000 unit/mL suspension, Take 5 mL by mouth 4 (Four) Times a Day., Disp: 280 mL, Rfl: 2  •  nystatin (MYCOSTATIN) 681860 UNIT/GM cream, Apply 1 application topically to the appropriate area as directed 2 (Two) Times a Day., Disp: 15 g, Rfl: 0       OBJECTIVE    Vital Signs   /73 (BP Location: Left arm, Patient Position: Sitting, Cuff Size: Adult)   Pulse 86   Temp 98 °F (36.7 °C) (Tympanic)   Resp 18   Ht 177.8 cm (70\")   Wt 86.8 kg (191 lb 6.4 oz)   SpO2 92% Comment: room air  BMI 27.46 kg/m²     Physical Exam  Vitals reviewed.   Constitutional:       General: He is not in acute distress.     Appearance: Normal appearance. He is not ill-appearing.   HENT:      Head: Normocephalic and " atraumatic.      Nose: Nose normal.      Mouth/Throat:      Mouth: Mucous membranes are moist.      Pharynx: Oropharynx is clear.   Eyes:      Extraocular Movements: Extraocular movements intact.      Conjunctiva/sclera: Conjunctivae normal.      Pupils: Pupils are equal, round, and reactive to light.   Cardiovascular:      Rate and Rhythm: Normal rate and regular rhythm.      Pulses: Normal pulses.      Heart sounds: Normal heart sounds.   Pulmonary:      Effort: Pulmonary effort is normal. No respiratory distress.      Breath sounds: No stridor. Wheezing present. No rhonchi or rales.      Comments: Diminished breath sounds bilateral lung fields specially in the bases with occasional expiratory wheeze in the left upper lobe posterior  Abdominal:      General: Bowel sounds are normal.   Musculoskeletal:         General: Normal range of motion.      Cervical back: Normal range of motion and neck supple.      Right lower leg: No edema.      Left lower leg: No edema.   Lymphadenopathy:      Cervical: No cervical adenopathy.   Skin:     General: Skin is warm and dry.   Neurological:      General: No focal deficit present.      Mental Status: He is alert and oriented to person, place, and time.   Psychiatric:         Mood and Affect: Mood normal.         Behavior: Behavior normal.          Results Review  I have personally reviewed the chest x-ray, PFT and 6-minute walk as follows:  XR Chest PA & Lateral [[id number]] (Order [id number])  Order  Status: Final result     Appointment Information    PACS Images     Radiology Images  Study Result    Narrative & Impression   PROCEDURE:  XR CHEST PA AND LATERAL     COMPARISON: [facility], CT, CT CHEST HI RESOLUTION DIAGNOSTIC, 3/09/2022, 9:01.     INDICATIONS:  PNEUMONIA FOLLOW-UP     FINDINGS:          There are emphysematous changes.  There are some interstitial changes in lower lungs that could be   reflective of more chronic change.  There are no pleural  effusions.  The trachea is in the midline.    The heart is not enlarged.     IMPRESSION:                 1. COPD  2. There is some prominence of markings within the interstitium of the lower lungs that could be   reflective of more chronic change.            LILA LEWIS MD         Electronically Signed and Approved By: LILA LEWIS MD on 7/14/2022 at 17:13          Results  Pulmonary Function Test (Order [id number])  Order-Level Documents:    Scan on 9/30/2022 by Nancy Guthrie APRN: PULMONARY FUNCTION TEST, [facility], 09/30/2022         Author: -- Service: -- Author Type: --   Filed:  Date of Service:  Creation Time:    Status: (Other)   Pulmonary function test     Spirometry:  Severe obstructive lung defect noted.  Postbronchodilator FEV1 is 1.39 L / 46% predicted.  No bronchodilator response was noted.  Flow volume loop shows obstruction.     Lung volumes:  No evidence of restriction or hyperinflation.  Air-trapping present.     Diffusion capacity:  Diffuse capacity severely reduced to 24% predicted.     Overall impression:  Severe airflow obstruction with no bronchodilator response present.  Air trapping present.  Diffusion capacity severely reduced.  Compared to testing done in December 2021 the forced vital capacity is significantly proved from 3.42 L to 4.13 L, a 20% improvement.  FEV1 and total lung capacity relatively unchanged.  Diffusion capacity has dropped by 11% predicted.     Electronically signed by Devin Garcia MD, 10/26/22, 12:14 PM EDT.         File Link    Scan on 9/30/2022 by Nancy Guthrie APRN: PULMONARY FUNCTION TEST, [facility], 09/30/2022        Key Information    Document ID File Type Document Type Description   [id number] Image PULMONARY FUNCTION TEST - SCAN PULMONARY FUNCTION TEST, [facility], 09/30/2022     Import Information    Attached At Date Time User Dept   Order Level 9/30/2022  Nancy Guthrie APRN      Order    Pulmonary Function Test [[id number]]     Encounter    Hospital Encounter on  9/30/22 with [facility] PUL LAB ROOM          File Link    Scan on 9/30/2022 by [employer], [place]: CARDIO REHAB 6 MIN WALK, [facility], 09/30/2022        Key Information    Document ID File Type Document Type Description   [id number] Image REHAB - SCAN CARDIO REHAB 6 MIN WALK, [facility], 09/30/2022     Import Information    Attached At Date Time User Dept   Encounter Level 9/30/2022  [employer], [facility]      Encounter    Hospital Encounter on 9/30/22 with [facility] PUL LAB ROOM       ASSESSMENT & PLAN    Patient Active Problem List   Diagnosis   • Obstructive sleep apnea   • Hypoxia   • Ex-smoker   • Pulmonary emphysema (HCC)   • Bronchiectasis without complication (HCC)   • Positive QuantiFERON-TB Gold test       Diagnoses and all orders for this visit:    1. Pulmonary emphysema, unspecified emphysema type (HCC) (Primary)    2. Bronchiectasis without complication (HCC)    3. Obstructive sleep apnea    4. Fungal infection of skin  -     nystatin (MYCOSTATIN) 993914 UNIT/GM cream; Apply 1 application topically to the appropriate area as directed 2 (Two) Times a Day.  Dispense: 15 g; Refill: 0           Plan:  At this time we will send in nystatin cream for yeast infection at the corners of patient's mouth that I feel are secondary to his CPAP machine.  Patient has a follow-up appointment with Dr. Boyle already scheduled.  Patient to continue with Yupelri Proventil Pulmicort Allegra and albuterol inhaler.  Patient is encouraged to wear his O2 monitoring his O2 saturation titrating O2 to keep his sats above 90%.  We will also send patient to pulmonary rehab at this time.  Encouraged patient to not to wait too long to get his flu vaccine.    Smoking status:former  Vaccination status:up to date with COVID and pneumnia  Medications personally reviewed    Follow Up  Return for Next scheduled follow up.    Patient was given instructions and counseling regarding his condition or for health maintenance advice. Please see specific  information pulled into the AVS if appropriate.

## 2022-11-07 ENCOUNTER — OFFICE VISIT (OUTPATIENT)
Dept: PULMONOLOGY | Facility: CLINIC | Age: 77
End: 2022-11-07

## 2022-11-07 VITALS
TEMPERATURE: 98 F | OXYGEN SATURATION: 92 % | SYSTOLIC BLOOD PRESSURE: 140 MMHG | RESPIRATION RATE: 18 BRPM | WEIGHT: 191.4 LBS | BODY MASS INDEX: 27.4 KG/M2 | DIASTOLIC BLOOD PRESSURE: 73 MMHG | HEART RATE: 86 BPM | HEIGHT: 70 IN

## 2022-11-07 DIAGNOSIS — J43.9 PULMONARY EMPHYSEMA, UNSPECIFIED EMPHYSEMA TYPE: Primary | ICD-10-CM

## 2022-11-07 DIAGNOSIS — G47.33 OBSTRUCTIVE SLEEP APNEA: ICD-10-CM

## 2022-11-07 DIAGNOSIS — J47.9 BRONCHIECTASIS WITHOUT COMPLICATION: ICD-10-CM

## 2022-11-07 DIAGNOSIS — J43.2 CENTRILOBULAR EMPHYSEMA: ICD-10-CM

## 2022-11-07 DIAGNOSIS — B36.9 FUNGAL INFECTION OF SKIN: ICD-10-CM

## 2022-11-07 PROCEDURE — 99213 OFFICE O/P EST LOW 20 MIN: CPT | Performed by: NURSE PRACTITIONER

## 2022-11-07 RX ORDER — NYSTATIN 100000 U/G
1 CREAM TOPICAL 2 TIMES DAILY
Qty: 15 G | Refills: 0 | Status: SHIPPED | OUTPATIENT
Start: 2022-11-07

## 2022-11-14 ENCOUNTER — OFFICE VISIT (OUTPATIENT)
Dept: CARDIAC REHAB | Facility: HOSPITAL | Age: 77
End: 2022-11-14

## 2022-11-14 VITALS
SYSTOLIC BLOOD PRESSURE: 116 MMHG | OXYGEN SATURATION: 97 % | WEIGHT: 190.48 LBS | BODY MASS INDEX: 27.33 KG/M2 | DIASTOLIC BLOOD PRESSURE: 76 MMHG | HEART RATE: 76 BPM

## 2022-11-14 DIAGNOSIS — J43.9 PULMONARY EMPHYSEMA, UNSPECIFIED EMPHYSEMA TYPE: Primary | ICD-10-CM

## 2022-11-14 PROCEDURE — 94625 PHY/QHP OP PULM RHB W/O MNTR: CPT

## 2022-11-14 NOTE — PROGRESS NOTES
Chief Complaint  Pulmonary Rehab Phase II    Rafael Salcedo presents to [facility]    Physical Exam  Psychiatric:         Attention and Perception: Attention and perception normal.         Mood and Affect: Mood and affect normal.         Speech: Speech normal.         Behavior: Behavior normal. Behavior is cooperative.         Thought Content: Thought content normal.         Cognition and Memory: Cognition and memory normal.         Judgment: Judgment normal.      Comments: Patient spoke about recent grandson committed suicide.  Patient states he is still in disbelief and sad about the situation.  Patient states he feels depressed about this situation.  I gave patient information of local counselors in area.  I gave patient information on alanis services available.  He was receptive but declined speaking to alanis at this time.        Result Review :          Assessment and Plan    Diagnoses and all orders for this visit:    1. Pulmonary emphysema, unspecified emphysema type (HCC) (Primary)        Kiesha Butt, CRT

## 2022-11-14 NOTE — PROGRESS NOTES
Phase II and III Education    Discipline Teaching:  Cardiac Rehab Phase II []      Cardiac Rehab Phase III []      Pulmonary Rehab II [x]      Pulmonary Rehab III []      Peripheral Artery Disease []        Class Given:  Asthma Management []      Beginners Cardiac Rehab []      Beginners Pulmonary Rehab [x]      CAD I []      CAD II []      CHF []      Diabetes Mellitus []     Diet & Cholesterol []     Diet Consult [x]      Energy Conservation [x]     Exercise [x]     Grocery Store Tour []     Harmonica Therapy []     High Blood Pressure []     Living with COPD [x]     Medication Safety []     Peripheral Artery Disease []     S & S of Infection [x]      Stress []        Education Topics:  Angina []      Arrhythmias []      Asthma Management []      Beginning Cardiac Rehab []      Blood Pressure []     Blood Sugar []     Breathing Retrainment [x]     CHF []     Cooking []     Daily Weight [x]     Diabetes Mellitus []      Diet []     Energy Conservation [x]     Exercise [x]      Foot Care []      Grocery Store Tour []      Heart Disease []      Heart Function []      Incision Care []     Living with COPD [x]     Medication Safety []     PAD Symptoms/Treatment []     Reconditioning Exercises [x]     S & S Infection [x]     Smoking Consult []     Stress Management []     Test Results []       Teaching Aids:  Video [x]      PAD Handout []      Pulmonary Workbook []      CAD Teaching Packet []      Stress Teaching Packet []     Exercise Teaching Packet [x]      Diet Teaching Packet []      CHF Teaching Packet []      Blood Pressure Teaching Packet []      Smoking Cessation Packet []      Medication Safety Handout []      Pflex Training []      Diabetes Teaching Packet []      Harmonica Therapy Packet []        Teaching Method:  Discussion [x]      Written Information [x]      Audio Visual [x]      Demonstration [x]        Teaching Recipient:  Patient [x]      Family []      Friend []      Legal Guardian []      Primary  Care Giver []      Significant Other []        Barriers To Learning:  None [x]      Auditory []      Cultural []      Emotional []      Financial []      Language []    Mental []      Motivation []      Physical []      Reading Skills []      Methodist []      Verbal/Cognitive []      Visual []      Written/Cognitive []        Teaching Response:  Verified Via Teach back [x]      Return Demo Via Teach Back [x]      Reinforcement Needed []      Unable to Return Demo []      Unable to Comprehend []      Declines Teaching  []        Additional Education Topics:  Instructed with written and demonstrated home exercises stretch and strengthening exercises with patient  Educated patient with written and verbal heart healthy plans.  Educated patient with written and verbal sleep apnea  Educated patient with written and verbal hypoxia  Educated patient with written and verbal home oxygen.  Educated and demonstrated technique to patient for use of aerobika for home use.         Follow Up Instruction Comment:      Chief Complaint  Pulmonary Rehab Phase II    Rafael Salcedo presents to [facility] CARDIOPULMONARY REHABILITATION    Physical Exam  Cardiovascular:      Rate and Rhythm: Normal rate and regular rhythm.      Pulses: Normal pulses.      Heart sounds: Normal heart sounds.   Pulmonary:      Effort: Pulmonary effort is normal.      Breath sounds: Normal breath sounds.   Psychiatric:         Mood and Affect: Mood normal.         Behavior: Behavior normal.        Result Review :          Assessment and Plan    Diagnoses and all orders for this visit:    1. Pulmonary emphysema, unspecified emphysema type (HCC) (Primary)        Kiesha Filomena, CRT     Pt tolerated exercises see scanned report.

## 2022-11-16 ENCOUNTER — APPOINTMENT (OUTPATIENT)
Dept: CARDIAC REHAB | Facility: HOSPITAL | Age: 77
End: 2022-11-16

## 2022-11-16 ENCOUNTER — TREATMENT (OUTPATIENT)
Dept: CARDIAC REHAB | Facility: HOSPITAL | Age: 77
End: 2022-11-16

## 2022-11-16 DIAGNOSIS — J43.9 PULMONARY EMPHYSEMA, UNSPECIFIED EMPHYSEMA TYPE: Primary | ICD-10-CM

## 2022-11-16 PROCEDURE — 94625 PHY/QHP OP PULM RHB W/O MNTR: CPT

## 2022-11-16 PROCEDURE — 94626 PHY/QHP OP PULM RHB W/MNTR: CPT

## 2022-11-18 ENCOUNTER — APPOINTMENT (OUTPATIENT)
Dept: CARDIAC REHAB | Facility: HOSPITAL | Age: 77
End: 2022-11-18

## 2022-11-18 ENCOUNTER — TREATMENT (OUTPATIENT)
Dept: CARDIAC REHAB | Facility: HOSPITAL | Age: 77
End: 2022-11-18

## 2022-11-18 DIAGNOSIS — J43.9 PULMONARY EMPHYSEMA, UNSPECIFIED EMPHYSEMA TYPE: Primary | ICD-10-CM

## 2022-11-18 PROCEDURE — 94625 PHY/QHP OP PULM RHB W/O MNTR: CPT

## 2022-11-21 ENCOUNTER — TREATMENT (OUTPATIENT)
Dept: CARDIAC REHAB | Facility: HOSPITAL | Age: 77
End: 2022-11-21

## 2022-11-21 ENCOUNTER — APPOINTMENT (OUTPATIENT)
Dept: CARDIAC REHAB | Facility: HOSPITAL | Age: 77
End: 2022-11-21

## 2022-11-21 DIAGNOSIS — J43.9 PULMONARY EMPHYSEMA, UNSPECIFIED EMPHYSEMA TYPE: Primary | ICD-10-CM

## 2022-11-21 PROCEDURE — 94625 PHY/QHP OP PULM RHB W/O MNTR: CPT

## 2022-11-23 ENCOUNTER — TREATMENT (OUTPATIENT)
Dept: CARDIAC REHAB | Facility: HOSPITAL | Age: 77
End: 2022-11-23

## 2022-11-23 ENCOUNTER — APPOINTMENT (OUTPATIENT)
Dept: CARDIAC REHAB | Facility: HOSPITAL | Age: 77
End: 2022-11-23

## 2022-11-23 DIAGNOSIS — J43.9 PULMONARY EMPHYSEMA, UNSPECIFIED EMPHYSEMA TYPE: Primary | ICD-10-CM

## 2022-11-23 PROCEDURE — 94625 PHY/QHP OP PULM RHB W/O MNTR: CPT

## 2022-11-28 ENCOUNTER — APPOINTMENT (OUTPATIENT)
Dept: CARDIAC REHAB | Facility: HOSPITAL | Age: 77
End: 2022-11-28

## 2022-11-28 ENCOUNTER — TREATMENT (OUTPATIENT)
Dept: CARDIAC REHAB | Facility: HOSPITAL | Age: 77
End: 2022-11-28

## 2022-11-28 DIAGNOSIS — J43.9 PULMONARY EMPHYSEMA, UNSPECIFIED EMPHYSEMA TYPE: Primary | ICD-10-CM

## 2022-11-28 PROCEDURE — 94625 PHY/QHP OP PULM RHB W/O MNTR: CPT

## 2022-11-30 ENCOUNTER — APPOINTMENT (OUTPATIENT)
Dept: CARDIAC REHAB | Facility: HOSPITAL | Age: 77
End: 2022-11-30

## 2022-11-30 ENCOUNTER — TREATMENT (OUTPATIENT)
Dept: CARDIAC REHAB | Facility: HOSPITAL | Age: 77
End: 2022-11-30

## 2022-11-30 DIAGNOSIS — J43.9 PULMONARY EMPHYSEMA, UNSPECIFIED EMPHYSEMA TYPE: Primary | ICD-10-CM

## 2022-11-30 PROCEDURE — 94625 PHY/QHP OP PULM RHB W/O MNTR: CPT

## 2022-12-02 ENCOUNTER — TREATMENT (OUTPATIENT)
Dept: CARDIAC REHAB | Facility: HOSPITAL | Age: 77
End: 2022-12-02
Payer: MEDICARE

## 2022-12-02 ENCOUNTER — TELEPHONE (OUTPATIENT)
Dept: PULMONOLOGY | Facility: CLINIC | Age: 77
End: 2022-12-02

## 2022-12-02 ENCOUNTER — APPOINTMENT (OUTPATIENT)
Dept: CARDIAC REHAB | Facility: HOSPITAL | Age: 77
End: 2022-12-02
Payer: MEDICARE

## 2022-12-02 DIAGNOSIS — J47.9 BRONCHIECTASIS WITHOUT COMPLICATION: ICD-10-CM

## 2022-12-02 DIAGNOSIS — J43.9 PULMONARY EMPHYSEMA, UNSPECIFIED EMPHYSEMA TYPE: Primary | ICD-10-CM

## 2022-12-02 DIAGNOSIS — J43.9 PULMONARY EMPHYSEMA, UNSPECIFIED EMPHYSEMA TYPE: ICD-10-CM

## 2022-12-02 DIAGNOSIS — R09.02 HYPOXIA: ICD-10-CM

## 2022-12-02 PROCEDURE — 94625 PHY/QHP OP PULM RHB W/O MNTR: CPT

## 2022-12-02 RX ORDER — BUDESONIDE 0.5 MG/2ML
0.5 INHALANT ORAL 2 TIMES DAILY
Qty: 60 EACH | Refills: 5 | Status: SHIPPED | OUTPATIENT
Start: 2022-12-02

## 2022-12-02 RX ORDER — FORMOTEROL FUMARATE 20 UG/2ML
20 SOLUTION RESPIRATORY (INHALATION)
Qty: 60 EACH | Refills: 5
Start: 2022-12-02

## 2022-12-02 NOTE — TELEPHONE ENCOUNTER
Patient came into office needing refills of yupelri, budesonide, and perforomist sent to [facility] pharmacy in [place], his prescription has . Patient is completely out of Yupelri.

## 2022-12-05 ENCOUNTER — APPOINTMENT (OUTPATIENT)
Dept: CARDIAC REHAB | Facility: HOSPITAL | Age: 77
End: 2022-12-05
Payer: MEDICARE

## 2022-12-05 ENCOUNTER — TREATMENT (OUTPATIENT)
Dept: CARDIAC REHAB | Facility: HOSPITAL | Age: 77
End: 2022-12-05
Payer: MEDICARE

## 2022-12-05 DIAGNOSIS — J43.9 PULMONARY EMPHYSEMA, UNSPECIFIED EMPHYSEMA TYPE: Primary | ICD-10-CM

## 2022-12-05 PROCEDURE — 94625 PHY/QHP OP PULM RHB W/O MNTR: CPT

## 2022-12-05 RX ORDER — REVEFENACIN 175 UG/3ML
175 SOLUTION RESPIRATORY (INHALATION)
Qty: 90 ML | Refills: 0 | COMMUNITY
Start: 2022-12-05 | End: 2022-12-06

## 2022-12-07 ENCOUNTER — TREATMENT (OUTPATIENT)
Dept: CARDIAC REHAB | Facility: HOSPITAL | Age: 77
End: 2022-12-07
Payer: MEDICARE

## 2022-12-07 ENCOUNTER — APPOINTMENT (OUTPATIENT)
Dept: CARDIAC REHAB | Facility: HOSPITAL | Age: 77
End: 2022-12-07
Payer: MEDICARE

## 2022-12-07 DIAGNOSIS — J43.9 PULMONARY EMPHYSEMA, UNSPECIFIED EMPHYSEMA TYPE: Primary | ICD-10-CM

## 2022-12-07 PROCEDURE — 94625 PHY/QHP OP PULM RHB W/O MNTR: CPT

## 2022-12-09 ENCOUNTER — TREATMENT (OUTPATIENT)
Dept: CARDIAC REHAB | Facility: HOSPITAL | Age: 77
End: 2022-12-09
Payer: MEDICARE

## 2022-12-09 ENCOUNTER — TELEPHONE (OUTPATIENT)
Dept: PULMONOLOGY | Facility: CLINIC | Age: 77
End: 2022-12-09

## 2022-12-09 ENCOUNTER — APPOINTMENT (OUTPATIENT)
Dept: CARDIAC REHAB | Facility: HOSPITAL | Age: 77
End: 2022-12-09
Payer: MEDICARE

## 2022-12-09 DIAGNOSIS — J43.9 PULMONARY EMPHYSEMA, UNSPECIFIED EMPHYSEMA TYPE: Primary | ICD-10-CM

## 2022-12-09 PROCEDURE — 94625 PHY/QHP OP PULM RHB W/O MNTR: CPT

## 2022-12-09 NOTE — TELEPHONE ENCOUNTER
Patient called stating he left paperwork this morning and is requesting a call back in regards to it.

## 2022-12-12 ENCOUNTER — TREATMENT (OUTPATIENT)
Dept: CARDIAC REHAB | Facility: HOSPITAL | Age: 77
End: 2022-12-12
Payer: MEDICARE

## 2022-12-12 ENCOUNTER — APPOINTMENT (OUTPATIENT)
Dept: CARDIAC REHAB | Facility: HOSPITAL | Age: 77
End: 2022-12-12
Payer: MEDICARE

## 2022-12-12 DIAGNOSIS — J43.9 PULMONARY EMPHYSEMA, UNSPECIFIED EMPHYSEMA TYPE: Primary | ICD-10-CM

## 2022-12-12 PROCEDURE — 94625 PHY/QHP OP PULM RHB W/O MNTR: CPT

## 2022-12-14 ENCOUNTER — APPOINTMENT (OUTPATIENT)
Dept: CARDIAC REHAB | Facility: HOSPITAL | Age: 77
End: 2022-12-14
Payer: MEDICARE

## 2022-12-14 ENCOUNTER — TREATMENT (OUTPATIENT)
Dept: CARDIAC REHAB | Facility: HOSPITAL | Age: 77
End: 2022-12-14
Payer: MEDICARE

## 2022-12-14 DIAGNOSIS — J43.9 PULMONARY EMPHYSEMA, UNSPECIFIED EMPHYSEMA TYPE: Primary | ICD-10-CM

## 2022-12-14 PROCEDURE — G0239 OTH RESP PROC, GROUP: HCPCS

## 2022-12-16 ENCOUNTER — APPOINTMENT (OUTPATIENT)
Dept: CARDIAC REHAB | Facility: HOSPITAL | Age: 77
End: 2022-12-16
Payer: MEDICARE

## 2022-12-19 ENCOUNTER — APPOINTMENT (OUTPATIENT)
Dept: CARDIAC REHAB | Facility: HOSPITAL | Age: 77
End: 2022-12-19
Payer: MEDICARE

## 2022-12-21 ENCOUNTER — APPOINTMENT (OUTPATIENT)
Dept: CARDIAC REHAB | Facility: HOSPITAL | Age: 77
End: 2022-12-21
Payer: MEDICARE

## 2022-12-23 ENCOUNTER — APPOINTMENT (OUTPATIENT)
Dept: CARDIAC REHAB | Facility: HOSPITAL | Age: 77
End: 2022-12-23
Payer: MEDICARE

## 2022-12-28 ENCOUNTER — APPOINTMENT (OUTPATIENT)
Dept: CARDIAC REHAB | Facility: HOSPITAL | Age: 77
End: 2022-12-28
Payer: MEDICARE

## 2022-12-30 ENCOUNTER — APPOINTMENT (OUTPATIENT)
Dept: CARDIAC REHAB | Facility: HOSPITAL | Age: 77
End: 2022-12-30
Payer: MEDICARE

## 2023-01-02 ENCOUNTER — APPOINTMENT (OUTPATIENT)
Dept: CARDIAC REHAB | Facility: HOSPITAL | Age: 78
End: 2023-01-02
Payer: MEDICARE

## 2023-01-04 ENCOUNTER — TREATMENT (OUTPATIENT)
Dept: CARDIAC REHAB | Facility: HOSPITAL | Age: 78
End: 2023-01-04
Payer: MEDICARE

## 2023-01-04 ENCOUNTER — APPOINTMENT (OUTPATIENT)
Dept: CARDIAC REHAB | Facility: HOSPITAL | Age: 78
End: 2023-01-04
Payer: MEDICARE

## 2023-01-04 DIAGNOSIS — J43.9 PULMONARY EMPHYSEMA, UNSPECIFIED EMPHYSEMA TYPE: Primary | ICD-10-CM

## 2023-01-04 PROCEDURE — 94625 PHY/QHP OP PULM RHB W/O MNTR: CPT

## 2023-01-06 ENCOUNTER — TREATMENT (OUTPATIENT)
Dept: CARDIAC REHAB | Facility: HOSPITAL | Age: 78
End: 2023-01-06
Payer: MEDICARE

## 2023-01-06 ENCOUNTER — APPOINTMENT (OUTPATIENT)
Dept: CARDIAC REHAB | Facility: HOSPITAL | Age: 78
End: 2023-01-06
Payer: MEDICARE

## 2023-01-06 DIAGNOSIS — J43.9 PULMONARY EMPHYSEMA, UNSPECIFIED EMPHYSEMA TYPE: Primary | ICD-10-CM

## 2023-01-06 PROCEDURE — 94625 PHY/QHP OP PULM RHB W/O MNTR: CPT

## 2023-01-09 ENCOUNTER — APPOINTMENT (OUTPATIENT)
Dept: CARDIAC REHAB | Facility: HOSPITAL | Age: 78
End: 2023-01-09
Payer: MEDICARE

## 2023-01-09 ENCOUNTER — TREATMENT (OUTPATIENT)
Dept: CARDIAC REHAB | Facility: HOSPITAL | Age: 78
End: 2023-01-09
Payer: MEDICARE

## 2023-01-09 DIAGNOSIS — J43.9 PULMONARY EMPHYSEMA, UNSPECIFIED EMPHYSEMA TYPE: Primary | ICD-10-CM

## 2023-01-09 PROCEDURE — 94625 PHY/QHP OP PULM RHB W/O MNTR: CPT

## 2023-01-11 ENCOUNTER — APPOINTMENT (OUTPATIENT)
Dept: CARDIAC REHAB | Facility: HOSPITAL | Age: 78
End: 2023-01-11
Payer: MEDICARE

## 2023-01-11 ENCOUNTER — TREATMENT (OUTPATIENT)
Dept: CARDIAC REHAB | Facility: HOSPITAL | Age: 78
End: 2023-01-11
Payer: MEDICARE

## 2023-01-11 DIAGNOSIS — J43.9 PULMONARY EMPHYSEMA, UNSPECIFIED EMPHYSEMA TYPE: Primary | ICD-10-CM

## 2023-01-11 PROCEDURE — 94625 PHY/QHP OP PULM RHB W/O MNTR: CPT

## 2023-01-13 ENCOUNTER — TREATMENT (OUTPATIENT)
Dept: CARDIAC REHAB | Facility: HOSPITAL | Age: 78
End: 2023-01-13
Payer: MEDICARE

## 2023-01-13 ENCOUNTER — APPOINTMENT (OUTPATIENT)
Dept: CARDIAC REHAB | Facility: HOSPITAL | Age: 78
End: 2023-01-13
Payer: MEDICARE

## 2023-01-13 DIAGNOSIS — J43.9 PULMONARY EMPHYSEMA, UNSPECIFIED EMPHYSEMA TYPE: Primary | ICD-10-CM

## 2023-01-13 PROCEDURE — 94626 PHY/QHP OP PULM RHB W/MNTR: CPT

## 2023-01-16 ENCOUNTER — TREATMENT (OUTPATIENT)
Dept: CARDIAC REHAB | Facility: HOSPITAL | Age: 78
End: 2023-01-16
Payer: MEDICARE

## 2023-01-16 ENCOUNTER — APPOINTMENT (OUTPATIENT)
Dept: CARDIAC REHAB | Facility: HOSPITAL | Age: 78
End: 2023-01-16
Payer: MEDICARE

## 2023-01-16 DIAGNOSIS — J43.9 PULMONARY EMPHYSEMA, UNSPECIFIED EMPHYSEMA TYPE: Primary | ICD-10-CM

## 2023-01-16 PROCEDURE — 94625 PHY/QHP OP PULM RHB W/O MNTR: CPT

## 2023-01-18 ENCOUNTER — APPOINTMENT (OUTPATIENT)
Dept: CARDIAC REHAB | Facility: HOSPITAL | Age: 78
End: 2023-01-18
Payer: MEDICARE

## 2023-01-18 ENCOUNTER — TREATMENT (OUTPATIENT)
Dept: CARDIAC REHAB | Facility: HOSPITAL | Age: 78
End: 2023-01-18
Payer: MEDICARE

## 2023-01-18 DIAGNOSIS — J43.9 PULMONARY EMPHYSEMA, UNSPECIFIED EMPHYSEMA TYPE: Primary | ICD-10-CM

## 2023-01-18 PROCEDURE — 94625 PHY/QHP OP PULM RHB W/O MNTR: CPT

## 2023-01-20 ENCOUNTER — APPOINTMENT (OUTPATIENT)
Dept: CARDIAC REHAB | Facility: HOSPITAL | Age: 78
End: 2023-01-20
Payer: MEDICARE

## 2023-01-23 ENCOUNTER — TREATMENT (OUTPATIENT)
Dept: CARDIAC REHAB | Facility: HOSPITAL | Age: 78
End: 2023-01-23
Payer: MEDICARE

## 2023-01-23 ENCOUNTER — APPOINTMENT (OUTPATIENT)
Dept: CARDIAC REHAB | Facility: HOSPITAL | Age: 78
End: 2023-01-23
Payer: MEDICARE

## 2023-01-23 DIAGNOSIS — J43.9 PULMONARY EMPHYSEMA, UNSPECIFIED EMPHYSEMA TYPE: Primary | ICD-10-CM

## 2023-01-23 PROCEDURE — 94625 PHY/QHP OP PULM RHB W/O MNTR: CPT

## 2023-01-25 ENCOUNTER — APPOINTMENT (OUTPATIENT)
Dept: CARDIAC REHAB | Facility: HOSPITAL | Age: 78
End: 2023-01-25
Payer: MEDICARE

## 2023-01-27 ENCOUNTER — APPOINTMENT (OUTPATIENT)
Dept: CARDIAC REHAB | Facility: HOSPITAL | Age: 78
End: 2023-01-27
Payer: MEDICARE

## 2023-01-30 ENCOUNTER — APPOINTMENT (OUTPATIENT)
Dept: CARDIAC REHAB | Facility: HOSPITAL | Age: 78
End: 2023-01-30
Payer: MEDICARE

## 2023-02-01 ENCOUNTER — APPOINTMENT (OUTPATIENT)
Dept: CARDIAC REHAB | Facility: HOSPITAL | Age: 78
End: 2023-02-01
Payer: MEDICARE

## 2023-02-03 ENCOUNTER — APPOINTMENT (OUTPATIENT)
Dept: CARDIAC REHAB | Facility: HOSPITAL | Age: 78
End: 2023-02-03
Payer: MEDICARE

## 2023-02-06 ENCOUNTER — TREATMENT (OUTPATIENT)
Dept: CARDIAC REHAB | Facility: HOSPITAL | Age: 78
End: 2023-02-06
Payer: MEDICARE

## 2023-02-06 ENCOUNTER — APPOINTMENT (OUTPATIENT)
Dept: CARDIAC REHAB | Facility: HOSPITAL | Age: 78
End: 2023-02-06
Payer: MEDICARE

## 2023-02-06 DIAGNOSIS — J43.9 PULMONARY EMPHYSEMA, UNSPECIFIED EMPHYSEMA TYPE: Primary | ICD-10-CM

## 2023-02-06 PROCEDURE — 94625 PHY/QHP OP PULM RHB W/O MNTR: CPT

## 2023-02-08 ENCOUNTER — APPOINTMENT (OUTPATIENT)
Dept: CARDIAC REHAB | Facility: HOSPITAL | Age: 78
End: 2023-02-08
Payer: MEDICARE

## 2023-02-08 ENCOUNTER — TREATMENT (OUTPATIENT)
Dept: CARDIAC REHAB | Facility: HOSPITAL | Age: 78
End: 2023-02-08
Payer: MEDICARE

## 2023-02-08 DIAGNOSIS — J43.9 PULMONARY EMPHYSEMA, UNSPECIFIED EMPHYSEMA TYPE: Primary | ICD-10-CM

## 2023-02-08 PROCEDURE — 94625 PHY/QHP OP PULM RHB W/O MNTR: CPT

## 2023-02-10 ENCOUNTER — APPOINTMENT (OUTPATIENT)
Dept: CARDIAC REHAB | Facility: HOSPITAL | Age: 78
End: 2023-02-10
Payer: MEDICARE

## 2023-02-10 ENCOUNTER — TREATMENT (OUTPATIENT)
Dept: CARDIAC REHAB | Facility: HOSPITAL | Age: 78
End: 2023-02-10
Payer: MEDICARE

## 2023-02-10 DIAGNOSIS — J43.9 PULMONARY EMPHYSEMA, UNSPECIFIED EMPHYSEMA TYPE: Primary | ICD-10-CM

## 2023-02-10 PROCEDURE — 94625 PHY/QHP OP PULM RHB W/O MNTR: CPT

## 2023-02-13 ENCOUNTER — APPOINTMENT (OUTPATIENT)
Dept: CARDIAC REHAB | Facility: HOSPITAL | Age: 78
End: 2023-02-13
Payer: MEDICARE

## 2023-02-13 ENCOUNTER — TREATMENT (OUTPATIENT)
Dept: CARDIAC REHAB | Facility: HOSPITAL | Age: 78
End: 2023-02-13
Payer: MEDICARE

## 2023-02-13 DIAGNOSIS — J43.9 PULMONARY EMPHYSEMA, UNSPECIFIED EMPHYSEMA TYPE: Primary | ICD-10-CM

## 2023-02-13 PROCEDURE — G0239 OTH RESP PROC, GROUP: HCPCS

## 2023-02-15 ENCOUNTER — TREATMENT (OUTPATIENT)
Dept: CARDIAC REHAB | Facility: HOSPITAL | Age: 78
End: 2023-02-15
Payer: MEDICARE

## 2023-02-15 ENCOUNTER — APPOINTMENT (OUTPATIENT)
Dept: CARDIAC REHAB | Facility: HOSPITAL | Age: 78
End: 2023-02-15
Payer: MEDICARE

## 2023-02-15 DIAGNOSIS — J43.9 PULMONARY EMPHYSEMA, UNSPECIFIED EMPHYSEMA TYPE: Primary | ICD-10-CM

## 2023-02-15 PROCEDURE — 94625 PHY/QHP OP PULM RHB W/O MNTR: CPT

## 2023-02-17 ENCOUNTER — TREATMENT (OUTPATIENT)
Dept: CARDIAC REHAB | Facility: HOSPITAL | Age: 78
End: 2023-02-17
Payer: MEDICARE

## 2023-02-17 DIAGNOSIS — J43.9 PULMONARY EMPHYSEMA, UNSPECIFIED EMPHYSEMA TYPE: Primary | ICD-10-CM

## 2023-02-17 PROCEDURE — 94625 PHY/QHP OP PULM RHB W/O MNTR: CPT

## 2023-02-20 ENCOUNTER — TREATMENT (OUTPATIENT)
Dept: CARDIAC REHAB | Facility: HOSPITAL | Age: 78
End: 2023-02-20
Payer: MEDICARE

## 2023-02-20 DIAGNOSIS — J43.9 PULMONARY EMPHYSEMA, UNSPECIFIED EMPHYSEMA TYPE: Primary | ICD-10-CM

## 2023-02-20 PROCEDURE — 94625 PHY/QHP OP PULM RHB W/O MNTR: CPT

## 2023-02-22 ENCOUNTER — APPOINTMENT (OUTPATIENT)
Dept: CARDIAC REHAB | Facility: HOSPITAL | Age: 78
End: 2023-02-22
Payer: MEDICARE

## 2023-02-24 ENCOUNTER — TREATMENT (OUTPATIENT)
Dept: CARDIAC REHAB | Facility: HOSPITAL | Age: 78
End: 2023-02-24
Payer: MEDICARE

## 2023-02-24 DIAGNOSIS — J43.9 PULMONARY EMPHYSEMA, UNSPECIFIED EMPHYSEMA TYPE: Primary | ICD-10-CM

## 2023-02-24 PROCEDURE — 94625 PHY/QHP OP PULM RHB W/O MNTR: CPT

## 2023-02-27 ENCOUNTER — TREATMENT (OUTPATIENT)
Dept: CARDIAC REHAB | Facility: HOSPITAL | Age: 78
End: 2023-02-27
Payer: MEDICARE

## 2023-02-27 DIAGNOSIS — J43.9 PULMONARY EMPHYSEMA, UNSPECIFIED EMPHYSEMA TYPE: Primary | ICD-10-CM

## 2023-02-27 PROCEDURE — 94625 PHY/QHP OP PULM RHB W/O MNTR: CPT

## 2023-03-01 ENCOUNTER — OFFICE VISIT (OUTPATIENT)
Dept: PULMONOLOGY | Facility: CLINIC | Age: 78
End: 2023-03-01
Payer: MEDICARE

## 2023-03-01 ENCOUNTER — TREATMENT (OUTPATIENT)
Dept: CARDIAC REHAB | Facility: HOSPITAL | Age: 78
End: 2023-03-01
Payer: MEDICARE

## 2023-03-01 VITALS
RESPIRATION RATE: 18 BRPM | HEIGHT: 71 IN | WEIGHT: 194.9 LBS | TEMPERATURE: 99.3 F | SYSTOLIC BLOOD PRESSURE: 147 MMHG | BODY MASS INDEX: 27.29 KG/M2 | OXYGEN SATURATION: 91 % | HEART RATE: 84 BPM | DIASTOLIC BLOOD PRESSURE: 75 MMHG

## 2023-03-01 DIAGNOSIS — R09.02 HYPOXIA: Primary | ICD-10-CM

## 2023-03-01 DIAGNOSIS — J43.9 PULMONARY EMPHYSEMA, UNSPECIFIED EMPHYSEMA TYPE: ICD-10-CM

## 2023-03-01 DIAGNOSIS — J47.9 BRONCHIECTASIS WITHOUT COMPLICATION: ICD-10-CM

## 2023-03-01 DIAGNOSIS — G47.33 OBSTRUCTIVE SLEEP APNEA: ICD-10-CM

## 2023-03-01 DIAGNOSIS — J43.9 PULMONARY EMPHYSEMA, UNSPECIFIED EMPHYSEMA TYPE: Primary | ICD-10-CM

## 2023-03-01 DIAGNOSIS — Z87.891 EX-SMOKER: ICD-10-CM

## 2023-03-01 DIAGNOSIS — R76.12 POSITIVE QUANTIFERON-TB GOLD TEST: ICD-10-CM

## 2023-03-01 PROCEDURE — 94625 PHY/QHP OP PULM RHB W/O MNTR: CPT

## 2023-03-01 PROCEDURE — 99214 OFFICE O/P EST MOD 30 MIN: CPT | Performed by: INTERNAL MEDICINE

## 2023-03-01 RX ORDER — AMOXICILLIN 875 MG/1
1 TABLET, COATED ORAL EVERY 12 HOURS SCHEDULED
COMMUNITY
Start: 2022-12-17 | End: 2023-03-01

## 2023-03-01 RX ORDER — COVID-19 ANTIGEN TEST
KIT MISCELLANEOUS
COMMUNITY
Start: 2022-12-19

## 2023-03-01 NOTE — PROGRESS NOTES
Primary Care Provider  Aaron Sandoval MD     Referring Provider  No ref. provider found     Chief Complaint  Sleep Apnea, Emphysema, Shortness of Breath, and Follow-up (6 month follow up)    Subjective          Rafael Salcedo presents to [facility] PULMONARY & CRITICAL CARE MEDICINE  History of Present Illness  Rafael Salcedo is a 77 y.o. male patient with history of heavy smoking in past, 58-pack-year smoking, severe emphysema and bronchiectasis, history of staph aureus and Pseudomonas pneumonia, difficulty with airway clearance, obstructive sleep apnea.  He is here for follow-up.  Since his last office visit, patient has been on Perforomist, Pulmicort and Yupelri.  He has albuterol inhaler and nebulizer at home but rarely uses it.  He still has shortness of breath with heavy exertion but his cough has gone away.  He has slowed down with age but has been able to golf, mow his own grass.  He does have issues going up and down the stairs or doing heavy activities.  He has a smart vest at home which he uses twice a day.  He has been going to pulmonary rehab and it has been helping.  He is able to move up and down the stairs.  He has no changes in weight or appetite.  He had repeat CT scan of the chest a week ago which was reviewed with him today.  He feels like he is doing better.  He is currently on CPAP machine.  I reviewed his CPAP usage data.  DME company is [employer].   He is up-to-date on Covid vaccine, flu and pneumonia vaccine.     Review of Systems   Respiratory: Positive for shortness of breath.         General:  No Fatigue, No Fever No weight loss or loss of appetite  HEENT: No dysphagia, No Visual Changes, no rhinorrhea  Respiratory:  + cough,+Dyspnea, intermittent mucoid phlegm, No Pleuritic Pain, no wheezing, no hemoptysis.  Cardiovascular: Denies chest pain, denies palpitations,+YUEN, No Chest Pressure  Gastrointestinal:  No Abdominal Pain, No Nausea, No Vomiting, No  Diarrhea  Genitourinary:  No Dysuria, No Frequency, No Hesitancy  Musculoskeletal: No muscle pain or swelling  Endocrine:  No Heat Intolerance, No Cold Intolerance  Hematologic:  No Bleeding, No Bruising  Psychiatric:  No Anxiety, No Depression  Neurologic:  No Confusion, no Dysarthria, No Headaches  Skin:  No Rash, No Open Wounds    Family History   Problem Relation Age of Onset   • Cancer Mother    • Heart failure Father    • Cancer Brother    • Cancer Maternal Uncle    • Malig Hyperthermia Neg Hx         Social History     Socioeconomic History   • Marital status:    • Highest education level: High school graduate   Tobacco Use   • Smoking status: Former     Packs/day: 1.00     Years: 58.00     Pack years: 58.00     Types: Cigarettes     Start date: 1962     Quit date: 8/15/2020     Years since quittin.5   • Smokeless tobacco: Never   • Tobacco comments:     1-6 cigg a day quit    Vaping Use   • Vaping Use: Never used   Substance and Sexual Activity   • Alcohol use: Not Currently   • Drug use: Never   • Sexual activity: Defer        Past Medical History:   Diagnosis Date   • Emphysema of lung (HCC)    • Seasonal allergies    • Shortness of breath    • Sleep apnea    • Tremors of nervous system     essential tremors        Immunization History   Administered Date(s) Administered   • COVID-19 (MODERNA) 1st, 2nd, 3rd Dose Only 2021, 2021   • COVID-19 (MODERNA) BOOSTER 2021, 2021, 2022   • Fluad Quad 65+ 2022   • Pneumococcal Conjugate 13-Valent (PCV13) 2022         No Known Allergies       Current Outpatient Medications:   •  albuterol (PROVENTIL) (2.5 MG/3ML) 0.083% nebulizer solution, Take 2.5 mg by nebulization Every 4 (Four) Hours As Needed for Wheezing., Disp: 120 each, Rfl: 5  •  albuterol sulfate  (90 Base) MCG/ACT inhaler, Inhale 2 puffs Every 4 (Four) Hours As Needed for Wheezing., Disp: 1 g, Rfl: 5  •  ascorbic acid (VITAMIN C) 1000 MG  "tablet, Take 1 tablet by mouth Daily., Disp: , Rfl:   •  budesonide (PULMICORT) 0.5 MG/2ML nebulizer solution, Take 2 mL by nebulization 2 (Two) Times a Day., Disp: 60 each, Rfl: 5  •  Cholecalciferol (Vitamin D) 50 MCG (2000 UT) tablet, Take 2,000 Units by mouth Daily., Disp: , Rfl:   •  doxycycline (MONODOX) 100 MG capsule, , Disp: , Rfl:   •  Fexofenadine-Pseudoephedrine (ALLEGRA-D PO), Take  by mouth Daily As Needed., Disp: , Rfl:   •  Flowflex COVID-19 Ag Home Test kit, test, Disp: , Rfl:   •  formoterol (PERFOROMIST) 20 MCG/2ML nebulizer solution, Take 2 mL by nebulization 2 (Two) Times a Day., Disp: 60 each, Rfl: 5  •  furosemide (LASIX) 20 MG tablet, Take 1 tablet by mouth Daily., Disp: , Rfl:   •  nystatin (MYCOSTATIN) 100,000 unit/mL suspension, Take 5 mL by mouth 4 (Four) Times a Day., Disp: 280 mL, Rfl: 2  •  nystatin (MYCOSTATIN) 332732 UNIT/GM cream, Apply 1 application topically to the appropriate area as directed 2 (Two) Times a Day., Disp: 15 g, Rfl: 0  •  primidone (MYSOLINE) 250 MG tablet, Take 1 tablet by mouth 2 (two) times a day., Disp: , Rfl:   •  revefenacin (YUPELRI) 175 MCG/3ML nebulizer solution, Take 3 mL by nebulization Daily., Disp: 90 mL, Rfl: 5  •  Testosterone Cypionate (DEPOTESTOTERONE CYPIONATE) 200 MG/ML injection, Inject 0.25 mL into the appropriate muscle as directed by prescriber Every 28 (Twenty-Eight) Days., Disp: , Rfl:   •  triamcinolone (KENALOG) 0.1 % cream, APPLY DAILY AS NEEDED, Disp: , Rfl:   •  Turmeric 500 MG capsule, Take  by mouth., Disp: , Rfl:   •  Zinc 50 MG tablet, Take  by mouth., Disp: , Rfl:      Objective   Vital Signs:   /75 (BP Location: Left arm, Patient Position: Sitting, Cuff Size: Adult)   Pulse 84   Temp 99.3 °F (37.4 °C) (Tympanic)   Resp 18   Ht 180.3 cm (71\")   Wt 88.4 kg (194 lb 14.4 oz)   SpO2 91% Comment: room air/Nocturnal 2 liters/ prn  BMI 27.18 kg/m²         Mallampatti classification : 1  Physical Exam  Vital Signs " Reviewed  WDWN, Alert, in no acute distress, normal conversational   HEENT:  PERRL, EOMI.  OP, nares clear, no sinus tenderness  Neck:  Supple, no JVD, no thyromegaly  Lymph: no axillary, cervical, supraclavicular lymphadenopathy noted bilaterally  Chest:  good aeration, bilateral diminished breath sounds, no wheezing, crackles or rhonchi, resonant to percussion b/l  CV: RRR, no MGR, pulses 2+, equal.  Abd:  Soft, NT, ND, + BS, no HSM  EXT:  no clubbing, no cyanosis, No BLE edema  Neuro:  A&Ox3, CN grossly intact, no focal deficits.  Skin: No rashes or lesions noted     Result Review :   The following data was reviewed by: Garrison Boyle MD on 04/06/2022:          CBC w/diff    CBC w/Diff 9/3/21   WBC 7.77   RBC 4.63   Hemoglobin 15.3   Hematocrit 43.6   MCV 94.2   MCH 33.0   MCHC 35.1   RDW 13.2   Platelets 255   Neutrophil Rel % 67.2   Immature Granulocyte Rel % 0.6 (A)   Lymphocyte Rel % 19.8   Monocyte Rel % 7.1   Eosinophil Rel % 4.5   Basophil Rel % 0.8   (A) Abnormal value            Data reviewed: Radiologic studies CT scan of the chest from March 9, 2022 shows significant emphysema and bronchiectasis.  I reviewed the imaging myself.            Assessment and Plan    Diagnoses and all orders for this visit:    1. Hypoxia (Primary)  -     Ambulatory Referral to Pulmonary Rehab    2. Pulmonary emphysema, unspecified emphysema type (HCC)  -     Ambulatory Referral to Pulmonary Rehab    3. Bronchiectasis without complication (HCC)  -     Ambulatory Referral to Pulmonary Rehab    4. Obstructive sleep apnea  -     Ambulatory Referral to Pulmonary Rehab    5. 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  I advised him to use albuterol inhaler prior to doing heavy activities.  He has benefited significantly from  pulmonary rehab.  Advised him to continue with pulmonary rehab as much as possible.  He is from [place], wants to use physical fitness locally on the days that he does not go to pulmonary rehab.  Once pulmonary IVIG is completed, if he is not able to travel, I advised him to do workouts in the local physical center as much as possible.  I have made a referral for pulmonary rehab again today.    COPD exacerbation action plan was discussed in detail.  Patient has not needed any antibiotics or steroids for her lung issues over the last several months.    He had positive QuantiFERON gold test in past.  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  Bronchoscopy cultures were negative for Vail's disease.  Patient is much improved clinically and has not been treated for latent disease so far.    Difficulty with airway clearance: Continue with smart vest twice a day.  On bad days, he can use it up to 4 times a day.    Obstructive sleep apnea: Continue with CPAP machine.  We are reaching out to the DME company.  We will review the CPAP usage data once it is available.  Weight loss counseling was done.  Sleep hygiene was discussed in detail.    He does not smoke anymore.  He is up-to-date on flu vaccine, pneumonia vaccine and Covid vaccine including booster shot.    Follow Up   Return in about 6 months (around 9/1/2023).  Patient was given instructions and counseling regarding his condition or for health maintenance advice. Please see specific information pulled into the AVS if appropriate.       Electronically signed by Garrison Boyle MD, 3/1/2023, 11:49 EST.

## 2023-03-03 ENCOUNTER — APPOINTMENT (OUTPATIENT)
Dept: CARDIAC REHAB | Facility: HOSPITAL | Age: 78
End: 2023-03-03
Payer: MEDICARE

## 2023-03-06 ENCOUNTER — TREATMENT (OUTPATIENT)
Dept: CARDIAC REHAB | Facility: HOSPITAL | Age: 78
End: 2023-03-06
Payer: MEDICARE

## 2023-03-06 ENCOUNTER — TELEPHONE (OUTPATIENT)
Dept: PULMONOLOGY | Facility: CLINIC | Age: 78
End: 2023-03-06
Payer: MEDICARE

## 2023-03-06 DIAGNOSIS — J43.9 PULMONARY EMPHYSEMA, UNSPECIFIED EMPHYSEMA TYPE: Primary | ICD-10-CM

## 2023-03-06 PROCEDURE — 94625 PHY/QHP OP PULM RHB W/O MNTR: CPT

## 2023-03-06 NOTE — TELEPHONE ENCOUNTER
Patient FYI:    Patient came into the office to inform you that he went 2-3 months without wearing his CPAP due to dermatology having to remove a spot and then the area becoming infected. Patient stated dermatology advised him not to wear it while healing due to the band sitting directly on the infected area.   Patient stated you had asked him about this during his last office visit and he could not remember.

## 2023-03-08 ENCOUNTER — TREATMENT (OUTPATIENT)
Dept: CARDIAC REHAB | Facility: HOSPITAL | Age: 78
End: 2023-03-08
Payer: MEDICARE

## 2023-03-08 DIAGNOSIS — J43.9 PULMONARY EMPHYSEMA, UNSPECIFIED EMPHYSEMA TYPE: Primary | ICD-10-CM

## 2023-03-08 PROCEDURE — 94625 PHY/QHP OP PULM RHB W/O MNTR: CPT

## 2023-03-10 ENCOUNTER — TREATMENT (OUTPATIENT)
Dept: CARDIAC REHAB | Facility: HOSPITAL | Age: 78
End: 2023-03-10
Payer: MEDICARE

## 2023-03-10 DIAGNOSIS — J43.9 PULMONARY EMPHYSEMA, UNSPECIFIED EMPHYSEMA TYPE: Primary | ICD-10-CM

## 2023-03-10 PROCEDURE — 94625 PHY/QHP OP PULM RHB W/O MNTR: CPT

## 2023-03-13 ENCOUNTER — TREATMENT (OUTPATIENT)
Dept: CARDIAC REHAB | Facility: HOSPITAL | Age: 78
End: 2023-03-13
Payer: MEDICARE

## 2023-03-13 DIAGNOSIS — J43.9 PULMONARY EMPHYSEMA, UNSPECIFIED EMPHYSEMA TYPE: Primary | ICD-10-CM

## 2023-03-13 PROCEDURE — 94625 PHY/QHP OP PULM RHB W/O MNTR: CPT

## 2023-03-15 ENCOUNTER — TREATMENT (OUTPATIENT)
Dept: CARDIAC REHAB | Facility: HOSPITAL | Age: 78
End: 2023-03-15
Payer: MEDICARE

## 2023-03-15 DIAGNOSIS — J43.9 PULMONARY EMPHYSEMA, UNSPECIFIED EMPHYSEMA TYPE: Primary | ICD-10-CM

## 2023-03-15 PROCEDURE — 94625 PHY/QHP OP PULM RHB W/O MNTR: CPT

## 2023-03-17 ENCOUNTER — APPOINTMENT (OUTPATIENT)
Dept: CARDIAC REHAB | Facility: HOSPITAL | Age: 78
End: 2023-03-17
Payer: MEDICARE

## 2023-03-20 ENCOUNTER — APPOINTMENT (OUTPATIENT)
Dept: CARDIAC REHAB | Facility: HOSPITAL | Age: 78
End: 2023-03-20
Payer: MEDICARE

## 2023-03-22 ENCOUNTER — APPOINTMENT (OUTPATIENT)
Dept: CARDIAC REHAB | Facility: HOSPITAL | Age: 78
End: 2023-03-22
Payer: MEDICARE

## 2023-04-25 NOTE — PROGRESS NOTES
Primary Care Provider  Aaron Sandoval MD   Referring Provider  No ref. provider found    Patient Complaint  Sleep Apnea, Emphysema, and Follow-up      SUBJECTIVE    History of Presenting Illness  Rafael Salcedo is a pleasant 77 y.o. male who presents to [facility] PULMONARY & CRITICAL CARE MEDICINE for discussion regarding oxygen needs.  Patient last saw Dr. Boyle 3/1/2023.  Patient with history of heavy smoking in past, 58-pack-year smoking, severe emphysema and bronchiectasis, history of staph aureus and Pseudomonas pneumonia, difficulty with airway clearance, obstructive sleep apnea. He is currently on CPAP machine.  [employer] is [employer].  Patient has not been compliant with using his CPAP machine.  This is due to him having a skin biopsy on the top of his head.  When he would wear his mask with a strap across the top of his head it irritated this biopsied area.  This resulted in it becoming infected so he has not been able to use his CPAP due to this wound on the top of his head. He is going to go by the DME company to see about additional equipment that he needs.  Patient does use and benefits from having his CPAP machine when he does use the CPAP machine.  Patient is interested in starting Trelegy today.  He does not feel that performance Pulmicort and Yupelri are working for him at this time.  Patient does have some type of atopic dermatitis and he is wondering if this could be a side effect from the nebulizers.  Patient does have oxygen but he is not using it at this time.  He did not bring it with him.  His saturation is 89% on room air.  Patient states he is feeling just fine at this time.  He does have shortness of air with exertional activities states he will use his oxygen and his albuterol inhaler/nebulizer as needed.  He does continue to have a little bit of redness and irritation on his face and was wanting a refill on his nystatin cream today.    Denies coughing,  wheezing, headaches, chest pain, weight loss or hemoptysis. Denies fevers, chills and night sweats. Rafael Salcedo is able to perform ADLs without difficulties and denies any swollen glands/lymph nodes in the head or neck.    I have personally reviewed the review of systems, past family, social, medical and surgical histories; and agree with their findings.    Review of Systems  Constitutional symptoms:  Denied complaints   Ear, nose, throat: Denied complaints  Cardiovascular:  Denied complaints  Respiratory: Shortness of air with exertional activities  Gastrointestinal: Denied complaints  Musculoskeletal: Denied complaints    Family History   Problem Relation Age of Onset   • Cancer Mother    • Heart failure Father    • Cancer Brother    • Cancer Maternal Uncle    • Malig Hyperthermia Neg Hx         Social History     Socioeconomic History   • Marital status:    • Highest education level: High school graduate   Tobacco Use   • Smoking status: Former     Packs/day: 1.00     Years: 58.00     Pack years: 58.00     Types: Cigarettes     Start date: 1962     Quit date: 8/15/2020     Years since quittin.6   • Smokeless tobacco: Never   • Tobacco comments:     1-6 cigg a day quit    Vaping Use   • Vaping Use: Never used   Substance and Sexual Activity   • Alcohol use: Not Currently   • Drug use: Never   • Sexual activity: Defer        Past Medical History:   Diagnosis Date   • Emphysema of lung    • Seasonal allergies    • Shortness of breath    • Sleep apnea    • Tremors of nervous system     essential tremors        Immunization History   Administered Date(s) Administered   • COVID-19 (MODERNA) 1st,2nd,3rd Dose Monovalent 2021, 2021   • COVID-19 (MODERNA) Monovalent Original Booster 2021, 2021, 2022   • Fluad Quad 65+ 2022   • Pneumococcal Conjugate 13-Valent (PCV13) 2022       No Known Allergies       Current Outpatient Medications:   •  albuterol (PROVENTIL)  "(2.5 MG/3ML) 0.083% nebulizer solution, Take 2.5 mg by nebulization Every 4 (Four) Hours As Needed for Wheezing., Disp: 120 each, Rfl: 5  •  albuterol sulfate  (90 Base) MCG/ACT inhaler, Inhale 2 puffs Every 4 (Four) Hours As Needed for Wheezing., Disp: 1 g, Rfl: 5  •  ascorbic acid (VITAMIN C) 1000 MG tablet, Take 1 tablet by mouth Daily., Disp: , Rfl:   •  Cholecalciferol (Vitamin D) 50 MCG (2000 UT) tablet, Take 2,000 Units by mouth Daily., Disp: , Rfl:   •  Fexofenadine-Pseudoephedrine (ALLEGRA-D PO), Take  by mouth Daily As Needed., Disp: , Rfl:   •  furosemide (LASIX) 20 MG tablet, Take 1 tablet by mouth Daily., Disp: , Rfl:   •  nystatin (MYCOSTATIN) 100,000 unit/mL suspension, Take 5 mL by mouth 4 (Four) Times a Day., Disp: 280 mL, Rfl: 2  •  nystatin (MYCOSTATIN) 519805 UNIT/GM cream, Apply 1 application topically to the appropriate area as directed 2 (Two) Times a Day., Disp: 15 g, Rfl: 0  •  primidone (MYSOLINE) 250 MG tablet, Take 1 tablet by mouth 2 (two) times a day., Disp: , Rfl:   •  Testosterone Cypionate (DEPOTESTOTERONE CYPIONATE) 200 MG/ML injection, Inject 0.25 mL into the appropriate muscle as directed by prescriber Every 28 (Twenty-Eight) Days., Disp: , Rfl:   •  triamcinolone (KENALOG) 0.1 % cream, APPLY DAILY AS NEEDED, Disp: , Rfl:   •  Turmeric 500 MG capsule, Take  by mouth., Disp: , Rfl:   •  Zinc 50 MG tablet, Take  by mouth., Disp: , Rfl:   •  doxycycline (MONODOX) 100 MG capsule, , Disp: , Rfl:   •  Flowflex COVID-19 Ag Home Test kit, test (Patient not taking: Reported on 4/27/2023), Disp: , Rfl:            Vital Signs   /70 (BP Location: Left arm, Patient Position: Sitting, Cuff Size: Adult)   Pulse 73   Temp 99.8 °F (37.7 °C) (Tympanic)   Resp 18   Ht 180.3 cm (71\")   Wt 89.5 kg (197 lb 6.4 oz)   SpO2 (!) 89% Comment: room air  BMI 27.53 kg/m²       OBJECTIVE    Physical Exam  Vital Signs Reviewed   WDWN, Alert, NAD.    HEENT:  PERRL, EOMI.  OP, nares " clear  Neck:  Supple, no JVD, no thyromegaly  Chest:  good aeration, clear to auscultation bilaterally, tympanic to percussion bilaterally, no work of breathing noted  CV: RRR, no MGR, pulses 2+, equal.  Abd:  Soft, NT, ND, + BS, no HSM  EXT:  no clubbing, no cyanosis, no edema  Neuro:  A&Ox3, CN grossly intact, no focal deficits.  Skin: No rashes or lesions noted    Results Review  I have personally reviewed the prior office notes, hospital records, labs, and diagnostics.       File Link    Scan on 3/30/2023 1548 by Ya Ireland MA: [facility] Assessment        Key Information    Document ID File Type Document Type Description   [id number] Image REHAB - SCAN [facility] Assessment     Import Information    Attached At Date Time User Dept   Encounter Level 3/30/2023  3:48 PM Ya Ireland MA  [facility]     Encounter    Treatment on 3/15/23 with  [facility]/PULM REHAB - EXERCISE       ASSESSMENT         Patient Active Problem List   Diagnosis   • Obstructive sleep apnea   • Hypoxia   • Ex-smoker   • Pulmonary emphysema   • Bronchiectasis without complication   • Positive QuantiFERON-TB Gold test       Encounter Diagnoses   Name Primary?   • Pulmonary emphysema, unspecified emphysema type Yes   • Bronchiectasis without complication    • Hypoxia    • Fungal infection of skin    • Obstructive sleep apnea       PLAN  At this time we will provide patient with Trelegy 100 samples with instructions in use encouraged him to rinse his mouth out to prevent oral thrush.  Patient to stop Perforomist Pulmicort Yupelri while he is trialing the Trelegy.  If Trelegy works well for patient he is to call us so that we can send in a prescription for him.  If it is not working for him then he is to resume the performance Pulmicort Yupelri and follow-up with us.    I encouraged patient to reach out to dermatology regarding his rash, itchy, dry skin.  I encourage patient to take a daily OTC antihistamine without D  for his allergy symptoms.  Patient has been taking Allegra-D intermittently.      I encouraged patient to use his O2 at 2 L titrating to keep his sats above 90%.  I have encouraged patient to carry his O2 in the vehicle with him and to monitor his oxygen level.    We will also have patient follow-up with his [employer] company regarding his CPAP mask/strap.  Reviewed with patient his compliance report today which shows an AHI of 0.2.  He has had 14 out of 30 days usage.  Patient has been unable to use it due to an infection from a wound biopsy on the top of his head where the strap rubs against it.  Patient does need and benefits from his CPAP machine and will resume usage after healed wound.    Diagnoses and all orders for this visit:    1. Pulmonary emphysema, unspecified emphysema type (Primary)  -     albuterol (PROVENTIL) (2.5 MG/3ML) 0.083% nebulizer solution; Take 2.5 mg by nebulization Every 4 (Four) Hours As Needed for Wheezing.  Dispense: 120 each; Refill: 5  -     albuterol sulfate  (90 Base) MCG/ACT inhaler; Inhale 2 puffs Every 4 (Four) Hours As Needed for Wheezing.  Dispense: 1 g; Refill: 5    2. Bronchiectasis without complication  -     albuterol (PROVENTIL) (2.5 MG/3ML) 0.083% nebulizer solution; Take 2.5 mg by nebulization Every 4 (Four) Hours As Needed for Wheezing.  Dispense: 120 each; Refill: 5  -     albuterol sulfate  (90 Base) MCG/ACT inhaler; Inhale 2 puffs Every 4 (Four) Hours As Needed for Wheezing.  Dispense: 1 g; Refill: 5    3. Hypoxia    4. Fungal infection of skin  -     nystatin (MYCOSTATIN) 828216 UNIT/GM cream; Apply 1 application topically to the appropriate area as directed 2 (Two) Times a Day.  Dispense: 15 g; Refill: 0    5. Obstructive sleep apnea           Smoking status: Former  Vaccination status: Up-to-date  Medications personally reviewed    Follow Up  Return in about 3 months (around 7/27/2023) for with MD.    Patient was given instructions and counseling  regarding his condition or for health maintenance advice. Please see specific information pulled into the AVS if appropriate.

## 2023-04-27 ENCOUNTER — OFFICE VISIT (OUTPATIENT)
Dept: PULMONOLOGY | Facility: CLINIC | Age: 78
End: 2023-04-27
Payer: MEDICARE

## 2023-04-27 VITALS
HEART RATE: 73 BPM | HEIGHT: 71 IN | DIASTOLIC BLOOD PRESSURE: 70 MMHG | SYSTOLIC BLOOD PRESSURE: 153 MMHG | TEMPERATURE: 99.8 F | WEIGHT: 197.4 LBS | BODY MASS INDEX: 27.64 KG/M2 | RESPIRATION RATE: 18 BRPM | OXYGEN SATURATION: 89 %

## 2023-04-27 DIAGNOSIS — R09.02 HYPOXIA: ICD-10-CM

## 2023-04-27 DIAGNOSIS — G47.33 OBSTRUCTIVE SLEEP APNEA: ICD-10-CM

## 2023-04-27 DIAGNOSIS — J43.9 PULMONARY EMPHYSEMA, UNSPECIFIED EMPHYSEMA TYPE: Primary | ICD-10-CM

## 2023-04-27 DIAGNOSIS — B36.9 FUNGAL INFECTION OF SKIN: ICD-10-CM

## 2023-04-27 DIAGNOSIS — J47.9 BRONCHIECTASIS WITHOUT COMPLICATION: ICD-10-CM

## 2023-04-27 RX ORDER — FLUTICASONE FUROATE, UMECLIDINIUM BROMIDE AND VILANTEROL TRIFENATATE 100; 62.5; 25 UG/1; UG/1; UG/1
1 POWDER RESPIRATORY (INHALATION)
Qty: 2 EACH | Refills: 0 | COMMUNITY
Start: 2023-04-27 | End: 2023-04-28

## 2023-04-27 RX ORDER — ALBUTEROL SULFATE 2.5 MG/3ML
2.5 SOLUTION RESPIRATORY (INHALATION) EVERY 4 HOURS PRN
Qty: 120 EACH | Refills: 5
Start: 2023-04-27

## 2023-04-27 RX ORDER — NYSTATIN 100000 U/G
1 CREAM TOPICAL 2 TIMES DAILY
Qty: 15 G | Refills: 0 | Status: SHIPPED | OUTPATIENT
Start: 2023-04-27

## 2023-04-27 RX ORDER — ALBUTEROL SULFATE 90 UG/1
2 AEROSOL, METERED RESPIRATORY (INHALATION) EVERY 4 HOURS PRN
Qty: 1 G | Refills: 5 | Status: SHIPPED | OUTPATIENT
Start: 2023-04-27

## 2023-04-28 ENCOUNTER — TELEPHONE (OUTPATIENT)
Dept: PULMONOLOGY | Facility: CLINIC | Age: 78
End: 2023-04-28
Payer: MEDICARE

## 2023-04-28 NOTE — TELEPHONE ENCOUNTER
[facility] pharmacy stated the Proair hfa is not covered under patients insurance. Levabuterol is covered. Switched patient to Levabuterol

## 2023-06-07 NOTE — PROGRESS NOTES
Primary Care Provider  Aaron Sandoval MD   Referring Provider  No ref. provider found    Patient Complaint  acute visit, Sleep Apnea, Emphysema, hypoxia, and Shortness of Breath      SUBJECTIVE    History of Presenting Illness  Rafael Salcedo is a pleasant 77 y.o. male who presents to [facility] for acute visit for respiratory action.  Patient is here with his spouse today. Patient states he has been on several rounds of antibiotics and steroids by his PCP since May 11.  Patient states he went with [facility]  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  Patient did test later for COVID and it was negative.  Patient's wife was not sick.  Patient states his family provider in [place] did a chest x-ray tested him for COVID and RSV.  Patient states there was a problem with RSV test and it was not accurate.  But he did have a negative chest x-ray and negative COVID test.  If he is doing much better.  He continues to be on Trelegy 100 and feels this works very well for him.  He continues to be on albuterol inhaler as needed for shortness of air or wheeze and he takes Zyrtec daily.  He states he is having some allergy symptoms with postnasal drainage.  Patient does have O2 with bleed into his CPAP.  His DME company is [employer].  He does use a smart vest twice a day for his bronchiectasis.  Patient spouse states she purchased an [employer] machine so they would have portable oxygen.  Patient is a does have shortness of air with exertional activities but improves with rest and oxygen.    At present time he is not having any coughing, wheezing, headaches, chest pain, weight loss or hemoptysis. Denies fevers, chills and night sweats. Rafael Salcedo is able to perform ADLs without difficulties and denies any swollen glands/lymph nodes in the head or  neck.    I have personally reviewed the review of systems, past family, social, medical and surgical histories; and agree with their findings.    Review of Systems  Constitutional symptoms:  Denied complaints   Ear, nose, throat: Postnasal drainage  Cardiovascular:  Denied complaints  Respiratory: Shortness of air with exertional activities  Gastrointestinal: Denied complaints  Musculoskeletal: Denied complaints    Family History   Problem Relation Age of Onset    Cancer Mother     Heart failure Father     Cancer Brother     Cancer Maternal Uncle     Malig Hyperthermia Neg Hx         Social History     Socioeconomic History    Marital status:     Highest education level: High school graduate   Tobacco Use    Smoking status: Former     Packs/day: 1.00     Years: 58.00     Pack years: 58.00     Types: Cigarettes     Start date: 1962     Quit date: 8/15/2020     Years since quittin.8    Smokeless tobacco: Never    Tobacco comments:     1-6 cigg a day quit    Vaping Use    Vaping Use: Never used   Substance and Sexual Activity    Alcohol use: Not Currently    Drug use: Never    Sexual activity: Defer        Past Medical History:   Diagnosis Date    Emphysema of lung     Seasonal allergies     Shortness of breath     Sleep apnea     Tremors of nervous system     essential tremors        Immunization History   Administered Date(s) Administered    COVID-19 (MODERNA) 1st,2nd,3rd Dose Monovalent 2021, 2021    COVID-19 (MODERNA) Monovalent Original Booster 2021, 2021, 2022    Fluad Quad 65+ 2022    Pneumococcal Conjugate 13-Valent (PCV13) 2022       No Known Allergies       Current Outpatient Medications:     albuterol sulfate  (90 Base) MCG/ACT inhaler, Inhale 2 puffs Every 4 (Four) Hours As Needed for Wheezing., Disp: 1 g, Rfl: 5    ascorbic acid (VITAMIN C) 1000 MG tablet, Take 1 tablet by mouth Daily., Disp: , Rfl:     Cholecalciferol (Vitamin D) 50 MCG  "(2000 UT) tablet, Take 2,000 Units by mouth Daily., Disp: , Rfl:     Fluticasone-Umeclidin-Vilant (Trelegy Ellipta) 100-62.5-25 MCG/ACT inhaler, Inhale 1 puff Daily., Disp: 60 each, Rfl: 11    furosemide (LASIX) 20 MG tablet, Take 1 tablet by mouth Daily., Disp: , Rfl:     nystatin (MYCOSTATIN) 100,000 unit/mL suspension, Take 5 mL by mouth 4 (Four) Times a Day., Disp: 280 mL, Rfl: 2    nystatin (MYCOSTATIN) [id number] UNIT/GM cream, Apply 1 application topically to the appropriate area as directed 2 (Two) Times a Day., Disp: 15 g, Rfl: 0    primidone (MYSOLINE) 250 MG tablet, Take 1 tablet by mouth 2 (two) times a day., Disp: , Rfl:     Testosterone Cypionate (DEPOTESTOTERONE CYPIONATE) 200 MG/ML injection, Inject 0.25 mL into the appropriate muscle as directed by prescriber Every 28 (Twenty-Eight) Days., Disp: , Rfl:     triamcinolone (KENALOG) 0.1 % cream, APPLY DAILY AS NEEDED, Disp: , Rfl:     Turmeric 500 MG capsule, Take  by mouth., Disp: , Rfl:     azithromycin (ZITHROMAX) 250 MG tablet, Take 2 by mouth today then 1 daily for 4 days, Disp: 6 tablet, Rfl: 0    montelukast (SINGULAIR) 10 MG tablet, Take 1 tablet by mouth Every Night., Disp: 90 tablet, Rfl: 3    predniSONE (DELTASONE) 20 MG tablet, Take 2 tablets by mouth Daily., Disp: 14 tablet, Rfl: 0           Vital Signs   /67 (BP Location: Left arm, Patient Position: Sitting, Cuff Size: Adult)   Pulse 88   Temp 98.2 °F (36.8 °C) (Tympanic)   Resp 18   Ht 180.3 cm (71\")   Wt 86.3 kg (190 lb 3.2 oz)   SpO2 (!) 89% Comment: room air  BMI 26.53 kg/m²       OBJECTIVE    Physical Exam  Vital Signs Reviewed   WDWN, Alert, NAD.    HEENT:  PERRL, EOMI.  OP, nares clear  Neck:  Supple, no JVD, no thyromegaly  Chest:  good aeration, clear to auscultation bilaterally, tympanic to percussion bilaterally, no work of breathing noted  CV: RRR, no MGR, pulses 2+, equal.  Abd:  Soft, NT, ND, + BS, no HSM  EXT:  no clubbing, no cyanosis, no edema  Neuro:  A&Ox3, CN " grossly intact, no focal deficits.  Skin: No rashes or lesions noted    Results Review  I have personally reviewed the prior office notes, hospital records, labs, and diagnostics.    ASSESSMENT         Patient Active Problem List   Diagnosis    Obstructive sleep apnea    Hypoxia    Ex-smoker    Pulmonary emphysema    Bronchiectasis without complication    Positive QuantiFERON-TB Gold test       Encounter Diagnoses   Name Primary?    Pulmonary emphysema, unspecified emphysema type Yes    Bronchiectasis without complication     Shortness of breath     Thrush, oral       PLAN  Discussed with patient starting Singulair to take at night along with his Zyrtec in the morning for his allergy symptoms.  Patient to continue with Trelegy 100 rinsing his mouth out after each use to prevent oral thrush.  Patient to continue with his albuterol inhaler as needed for shortness of air or wheeze.  Also discussed with patient action plan for an exacerbation with use of having azithromycin and prednisone available to initiate should he develop symptoms as discussed.  Patient and spouse both verbalized understanding. Patient to follow-up at his next scheduled appointment or sooner if needed  Diagnoses and all orders for this visit:    1. Pulmonary emphysema, unspecified emphysema type (Primary)  -     montelukast (SINGULAIR) 10 MG tablet; Take 1 tablet by mouth Every Night.  Dispense: 90 tablet; Refill: 3  -     azithromycin (ZITHROMAX) 250 MG tablet; Take 2 by mouth today then 1 daily for 4 days  Dispense: 6 tablet; Refill: 0  -     predniSONE (DELTASONE) 20 MG tablet; Take 2 tablets by mouth Daily.  Dispense: 14 tablet; Refill: 0  -     Fluticasone-Umeclidin-Vilant (Trelegy Ellipta) 100-62.5-25 MCG/ACT inhaler; Inhale 1 puff Daily.  Dispense: 60 each; Refill: 11    2. Bronchiectasis without complication  -     montelukast (SINGULAIR) 10 MG tablet; Take 1 tablet by mouth Every Night.  Dispense: 90 tablet; Refill: 3  -     azithromycin  (ZITHROMAX) 250 MG tablet; Take 2 by mouth today then 1 daily for 4 days  Dispense: 6 tablet; Refill: 0  -     predniSONE (DELTASONE) 20 MG tablet; Take 2 tablets by mouth Daily.  Dispense: 14 tablet; Refill: 0  -     Fluticasone-Umeclidin-Vilant (Trelegy Ellipta) 100-62.5-25 MCG/ACT inhaler; Inhale 1 puff Daily.  Dispense: 60 each; Refill: 11    3. Shortness of breath  -     montelukast (SINGULAIR) 10 MG tablet; Take 1 tablet by mouth Every Night.  Dispense: 90 tablet; Refill: 3  -     azithromycin (ZITHROMAX) 250 MG tablet; Take 2 by mouth today then 1 daily for 4 days  Dispense: 6 tablet; Refill: 0  -     predniSONE (DELTASONE) 20 MG tablet; Take 2 tablets by mouth Daily.  Dispense: 14 tablet; Refill: 0  -     Fluticasone-Umeclidin-Vilant (Trelegy Ellipta) 100-62.5-25 MCG/ACT inhaler; Inhale 1 puff Daily.  Dispense: 60 each; Refill: 11    4. Thrush, oral  -     nystatin (MYCOSTATIN) 100,000 unit/mL suspension; Take 5 mL by mouth 4 (Four) Times a Day.  Dispense: 280 mL; Refill: 2           Smoking status: Former  Vaccination status: Up to date  Medications personally reviewed    Follow Up  Return for Next scheduled follow up.    Patient was given instructions and counseling regarding his condition or for health maintenance advice. Please see specific information pulled into the AVS if appropriate.

## 2023-06-08 ENCOUNTER — OFFICE VISIT (OUTPATIENT)
Dept: PULMONOLOGY | Facility: CLINIC | Age: 78
End: 2023-06-08
Payer: MEDICARE

## 2023-06-08 VITALS
HEART RATE: 88 BPM | SYSTOLIC BLOOD PRESSURE: 129 MMHG | TEMPERATURE: 98.2 F | RESPIRATION RATE: 18 BRPM | WEIGHT: 190.2 LBS | DIASTOLIC BLOOD PRESSURE: 67 MMHG | OXYGEN SATURATION: 89 % | BODY MASS INDEX: 26.63 KG/M2 | HEIGHT: 71 IN

## 2023-06-08 DIAGNOSIS — J43.9 PULMONARY EMPHYSEMA, UNSPECIFIED EMPHYSEMA TYPE: Primary | ICD-10-CM

## 2023-06-08 DIAGNOSIS — R06.02 SHORTNESS OF BREATH: ICD-10-CM

## 2023-06-08 DIAGNOSIS — B37.0 THRUSH, ORAL: ICD-10-CM

## 2023-06-08 DIAGNOSIS — J47.9 BRONCHIECTASIS WITHOUT COMPLICATION: ICD-10-CM

## 2023-06-08 RX ORDER — AZITHROMYCIN 250 MG/1
TABLET, FILM COATED ORAL
Qty: 6 TABLET | Refills: 0 | Status: SHIPPED | OUTPATIENT
Start: 2023-06-08

## 2023-06-08 RX ORDER — FLUTICASONE FUROATE, UMECLIDINIUM BROMIDE AND VILANTEROL TRIFENATATE 100; 62.5; 25 UG/1; UG/1; UG/1
1 POWDER RESPIRATORY (INHALATION)
Qty: 60 EACH | Refills: 11 | Status: SHIPPED | OUTPATIENT
Start: 2023-06-08

## 2023-06-08 RX ORDER — FLUTICASONE FUROATE, UMECLIDINIUM BROMIDE AND VILANTEROL TRIFENATATE 100; 62.5; 25 UG/1; UG/1; UG/1
1 POWDER RESPIRATORY (INHALATION)
Qty: 2 EACH | Refills: 0 | COMMUNITY
Start: 2023-06-08 | End: 2023-06-09

## 2023-06-08 RX ORDER — METHYLPREDNISOLONE 4 MG/1
TABLET ORAL
COMMUNITY
Start: 2023-05-30 | End: 2023-06-08

## 2023-06-08 RX ORDER — AMOXICILLIN 500 MG/1
1 CAPSULE ORAL 3 TIMES DAILY
COMMUNITY
Start: 2023-05-12 | End: 2023-06-08

## 2023-06-08 RX ORDER — FLUTICASONE FUROATE, UMECLIDINIUM BROMIDE AND VILANTEROL TRIFENATATE 100; 62.5; 25 UG/1; UG/1; UG/1
1 POWDER RESPIRATORY (INHALATION)
COMMUNITY
End: 2023-06-08 | Stop reason: SDUPTHER

## 2023-06-08 RX ORDER — DOXYCYCLINE HYCLATE 100 MG
1 TABLET ORAL EVERY 12 HOURS SCHEDULED
COMMUNITY
Start: 2023-05-23 | End: 2023-06-08

## 2023-06-08 RX ORDER — PREDNISONE 20 MG/1
40 TABLET ORAL DAILY
Qty: 14 TABLET | Refills: 0 | Status: SHIPPED | OUTPATIENT
Start: 2023-06-08

## 2023-06-08 RX ORDER — MONTELUKAST SODIUM 10 MG/1
10 TABLET ORAL NIGHTLY
Qty: 90 TABLET | Refills: 3 | Status: SHIPPED | OUTPATIENT
Start: 2023-06-08

## 2023-06-08 RX ORDER — LEVALBUTEROL TARTRATE 45 UG/1
2 AEROSOL, METERED ORAL EVERY 4 HOURS PRN
COMMUNITY
Start: 2023-04-28 | End: 2023-06-08

## 2023-06-22 ENCOUNTER — TELEPHONE (OUTPATIENT)
Dept: PULMONOLOGY | Facility: CLINIC | Age: 78
End: 2023-06-22

## 2023-06-22 NOTE — TELEPHONE ENCOUNTER
Caller: JILLIAN SCHMITZ    Relationship: SELF    Best call back number: [phone]    Requested Prescriptions:   Requested Prescriptions      No prescriptions requested or ordered in this encounter        Pharmacy where request should be sent:      Last office visit with prescribing clinician: 06/08/2023   Last telemedicine visit with prescribing clinician: Visit date not found   Next office visit with prescribing clinician: 9/7/2023     Additional details provided by patient: PATIENT KEEPS GETTING CALLS FROM [facility] ABOUT REFILLING TRELEGY . HE LIKES THE MEDICINE BUT WANTS TO KNOW IF HE SHOULD STOP OR PAUSE THE MEDICINE DUE TO GETTING BACK TO BACK CALLS.BEFORE HE RECEIVED HIS TRELEGY MEDICINE HE WAS SENT A NEW BOX OF NEBUILZER DOESN'T KNOW WHAT TO DO WITH THAT. OK TO LEAVE VOICEMAIL    Does the patient have less than a 3 day supply:  [] Yes  [x] No    Would you like a call back once the refill request has been completed: [x] Yes [] No    If the office needs to give you a call back, can they leave a voicemail: [x] Yes [] No    Janette Mccray   06/22/23 14:51 EDT

## 2023-06-23 NOTE — TELEPHONE ENCOUNTER
I called and spoke with Betsy at [facility], she stated that they have not called the patient that it was the pharmacy trying to reach patient in regards to renewing the nebulizer medications. She stated that she would call them to inform them the patient is currently on the Trelegy inhaler so they will no longer call the patient.       I called and left a voicemail for patient stating that I spoke with Betsy at [facility] and she informed me it was the pharmacy trying to reach patient to renew the nebulizer medication and that she would contact them to inform them that patient is currently on Trelegy. Stated for the patient to continue on the trelegy inhaler.

## 2023-08-30 ENCOUNTER — TELEPHONE (OUTPATIENT)
Dept: PULMONOLOGY | Facility: CLINIC | Age: 78
End: 2023-08-30
Payer: MEDICARE

## 2023-08-30 NOTE — TELEPHONE ENCOUNTER
Patient came in and stated and he is needing his Nystatin RX sent to [facility] in [place]. He is also needing some samples of Trelegy. Patient would like a  callback

## 2023-08-31 DIAGNOSIS — B37.0 THRUSH, ORAL: ICD-10-CM

## 2023-08-31 NOTE — TELEPHONE ENCOUNTER
Received RX request called pharmacy and they stated they did not have refills and needed a new prescription. Forwarded Rx request to SUSU Garcia.

## 2023-08-31 NOTE — TELEPHONE ENCOUNTER
Patient states he has not used the refills. Instructed him to call his pharmacy that Nancy Guthrie put 2 refills on prescription. Patient stated he would call the pharmacy. Instructed patient to contact our office if he has any issues. Informed patient we do not have any trelegy samples but I would put him on our waiting list and give him a call once we receive samples. Patient understood.

## 2023-09-07 ENCOUNTER — OFFICE VISIT (OUTPATIENT)
Dept: PULMONOLOGY | Facility: CLINIC | Age: 78
End: 2023-09-07
Payer: MEDICARE

## 2023-09-07 VITALS
OXYGEN SATURATION: 91 % | SYSTOLIC BLOOD PRESSURE: 152 MMHG | RESPIRATION RATE: 18 BRPM | HEIGHT: 70 IN | BODY MASS INDEX: 28.2 KG/M2 | HEART RATE: 64 BPM | DIASTOLIC BLOOD PRESSURE: 70 MMHG | TEMPERATURE: 98.2 F | WEIGHT: 197 LBS

## 2023-09-07 DIAGNOSIS — B37.0 THRUSH, ORAL: ICD-10-CM

## 2023-09-07 DIAGNOSIS — J43.9 PULMONARY EMPHYSEMA, UNSPECIFIED EMPHYSEMA TYPE: ICD-10-CM

## 2023-09-07 DIAGNOSIS — R76.12 POSITIVE QUANTIFERON-TB GOLD TEST: ICD-10-CM

## 2023-09-07 DIAGNOSIS — Z87.891 EX-SMOKER: Primary | ICD-10-CM

## 2023-09-07 DIAGNOSIS — G47.33 OBSTRUCTIVE SLEEP APNEA: ICD-10-CM

## 2023-09-07 DIAGNOSIS — J47.9 BRONCHIECTASIS WITHOUT COMPLICATION: ICD-10-CM

## 2023-09-07 DIAGNOSIS — B36.9 FUNGAL INFECTION OF SKIN: ICD-10-CM

## 2023-09-07 DIAGNOSIS — R09.02 HYPOXIA: ICD-10-CM

## 2023-09-07 DIAGNOSIS — Z23 ENCOUNTER FOR IMMUNIZATION: ICD-10-CM

## 2023-09-07 RX ORDER — ALBUTEROL SULFATE 90 UG/1
2 AEROSOL, METERED RESPIRATORY (INHALATION) EVERY 4 HOURS PRN
Qty: 1 G | Refills: 5 | Status: SHIPPED | OUTPATIENT
Start: 2023-09-07

## 2023-09-07 RX ORDER — LEVALBUTEROL TARTRATE 45 UG/1
2 AEROSOL, METERED ORAL EVERY 4 HOURS PRN
COMMUNITY
Start: 2023-08-14

## 2023-09-07 RX ORDER — NYSTATIN 100000 U/G
1 CREAM TOPICAL 2 TIMES DAILY
Qty: 15 G | Refills: 0 | Status: SHIPPED | OUTPATIENT
Start: 2023-09-07

## 2023-09-07 RX ORDER — DOXYCYCLINE HYCLATE 100 MG/1
1 CAPSULE ORAL EVERY 12 HOURS SCHEDULED
COMMUNITY
Start: 2023-07-11 | End: 2023-09-07

## 2023-09-07 NOTE — PROGRESS NOTES
Primary Care Provider  Aaron Sandoval MD     Referring Provider  No ref. provider found     Chief Complaint  Emphysema, Sleep Apnea, Shortness of Breath, and Follow-up (6 month follow up/ SOB upon exertion)    Subjective          Rafael Salcedo presents to [facility]IT[facility]ARE MEDICINE  Sleep Apnea    Emphysema    Shortness of Breath  Rafael Salcedo is a 78 y.o. male patient with history of heavy smoking in past, 58-pack-year smoking, severe emphysema and bronchiectasis, history of staph aureus and Pseudomonas pneumonia, difficulty with airway clearance, obstructive sleep apnea.  He is here for follow-up.  Since his last office visit, patient was on Perforomist, Pulmicort and Yupelri.  Due to patient's preference, it was switched to Trelegy Ellipta once daily.  He has albuterol inhaler and nebulizer at home but rarely uses it.  He still has shortness of breath with heavy exertion.  Cough is at baseline.  He has slowed down with age but has been able to golf, mow his own grass.  He does have issues going up and down the stairs or doing heavy activities.  He has a smart vest at home which he uses once a day.  He has been going to pulmonary rehab and it has been helping.  He is able to move up and down the stairs.  He has no changes in weight or appetite.  Previous CT scan of the chest and PFT was reviewed with him today..  He feels like he is doing better.  He is currently on CPAP machine.  I reviewed his CPAP usage data.  DME company is [employer].   He is up-to-date on Covid vaccine, flu and pneumonia vaccine.     Review of Systems   Respiratory:  Positive for shortness of breath.       General:  No Fatigue, No Fever No weight loss or loss of appetite  HEENT: No dysphagia, No Visual Changes, no rhinorrhea  Respiratory:  + cough,+Dyspnea, intermittent mucoid phlegm, No Pleuritic Pain, no wheezing, no hemoptysis.  Cardiovascular: Denies chest pain, denies palpitations,+YUEN, No  Chest Pressure  Gastrointestinal:  No Abdominal Pain, No Nausea, No Vomiting, No Diarrhea  Genitourinary:  No Dysuria, No Frequency, No Hesitancy  Musculoskeletal: No muscle pain or swelling  Endocrine:  No Heat Intolerance, No Cold Intolerance  Hematologic:  No Bleeding, No Bruising  Psychiatric:  No Anxiety, No Depression  Neurologic:  No Confusion, no Dysarthria, No Headaches  Skin:  No Rash, No Open Wounds    Family History   Problem Relation Age of Onset    Cancer Mother     Heart failure Father     Cancer Brother     Cancer Maternal Uncle     Malig Hyperthermia Neg Hx         Social History     Socioeconomic History    Marital status:     Highest education level: High school graduate   Tobacco Use    Smoking status: Former     Packs/day: 1.00     Years: 58.00     Pack years: 58.00     Types: Cigarettes     Start date: 9/2/1962     Quit date: 8/15/2020     Years since quitting: 3.0    Smokeless tobacco: Never    Tobacco comments:     1-6 cigg a day quit 2020   Vaping Use    Vaping Use: Never used   Substance and Sexual Activity    Alcohol use: Not Currently    Drug use: Never    Sexual activity: Defer        Past Medical History:   Diagnosis Date    Emphysema of lung     Seasonal allergies     Shortness of breath     Sleep apnea     Tremors of nervous system     essential tremors        Immunization History   Administered Date(s) Administered    COVID-19 (MODERNA) 1st,2nd,3rd Dose Monovalent 02/02/2021, 02/26/2021    COVID-19 (MODERNA) Monovalent Original Booster 02/02/2021, 02/26/2021, 03/28/2022    Fluad Quad 65+ 11/25/2022    Pneumococcal Conjugate 13-Valent (PCV13) 01/04/2022    Pneumococcal Conjugate 20-Valent (PCV20) 09/07/2023         No Known Allergies       Current Outpatient Medications:     albuterol sulfate  (90 Base) MCG/ACT inhaler, Inhale 2 puffs Every 4 (Four) Hours As Needed for Wheezing., Disp: 1 g, Rfl: 5    ascorbic acid (VITAMIN C) 1000 MG tablet, Take 1 tablet by mouth  "Daily., Disp: , Rfl:     Cholecalciferol (Vitamin D) 50 MCG (2000 UT) tablet, Take 1 tablet by mouth Daily., Disp: , Rfl:     furosemide (LASIX) 20 MG tablet, Take 1 tablet by mouth Daily., Disp: , Rfl:     levalbuterol (XOPENEX HFA) 45 MCG/ACT inhaler, 2 puffs Every 4 (Four) Hours As Needed for Wheezing., Disp: , Rfl:     montelukast (SINGULAIR) 10 MG tablet, Take 1 tablet by mouth Every Night., Disp: 90 tablet, Rfl: 3    mupirocin (BACTROBAN) 2 % ointment, APPLY OINTMENT TOPICALLY TO AFFECTED AREA THREE TIMES DAILY FOR 2 WEEKS, Disp: , Rfl:     nystatin (MYCOSTATIN) 100,000 unit/mL suspension, Take 2 mL by mouth 4 (Four) Times a Day., Disp: 280 mL, Rfl: 2    nystatin (MYCOSTATIN) [id number] UNIT/GM cream, Apply 1 application  topically to the appropriate area as directed 2 (Two) Times a Day., Disp: 15 g, Rfl: 0    predniSONE (DELTASONE) 20 MG tablet, Take 2 tablets by mouth Daily., Disp: 14 tablet, Rfl: 0    primidone (MYSOLINE) 250 MG tablet, Take 1 tablet by mouth 2 (two) times a day., Disp: , Rfl:     Testosterone Cypionate (DEPOTESTOTERONE CYPIONATE) 200 MG/ML injection, Inject 0.25 mL into the appropriate muscle as directed by prescriber Every 28 (Twenty-Eight) Days., Disp: , Rfl:     triamcinolone (KENALOG) 0.1 % cream, APPLY DAILY AS NEEDED, Disp: , Rfl:     Turmeric 500 MG capsule, Take  by mouth., Disp: , Rfl:     Fluticasone-Umeclidin-Vilant 200-62.5-25 MCG/ACT aerosol powder , Inhale 1 puff Daily., Disp: 3 each, Rfl: 3     Objective   Vital Signs:   /70 (BP Location: Left arm, Patient Position: Sitting, Cuff Size: Adult)   Pulse 64   Temp 98.2 °F (36.8 °C) (Tympanic)   Resp 18   Ht 177.8 cm (70\")   Wt 89.4 kg (197 lb)   SpO2 91% Comment: room air/ Pt has O2/ 2 liters as needed  BMI 28.27 kg/m²         Mallampatti classification : 1  Physical Exam  Vital Signs Reviewed  WDWN, Alert, in no acute distress, normal conversational   HEENT:  PERRL, EOMI.  OP, nares clear, no sinus tenderness  Neck:  " Supple, no JVD, no thyromegaly  Lymph: no axillary, cervical, supraclavicular lymphadenopathy noted bilaterally  Chest:  good aeration, bilateral diminished breath sounds, no wheezing, crackles or rhonchi, resonant to percussion b/l  CV: RRR, no MGR, pulses 2+, equal.  Abd:  Soft, NT, ND, + BS, no HSM  EXT:  no clubbing, no cyanosis, No BLE edema  Neuro:  A&Ox3, CN grossly intact, no focal deficits.  Skin: No rashes or lesions noted     Result Review :   The following data was reviewed by: Garrison Boyle MD on 04/06/2022:          CBC w/diff      CBC w/Diff 9/3/21   WBC 7.77   RBC 4.63   Hemoglobin 15.3   Hematocrit 43.6   MCV 94.2   MCH 33.0   MCHC 35.1   RDW 13.2   Platelets 255   Neutrophil Rel % 67.2   Immature Granulocyte Rel % 0.6 (A)   Lymphocyte Rel % 19.8   Monocyte Rel % 7.1   Eosinophil Rel % 4.5   Basophil Rel % 0.8   (A) Abnormal value              Data reviewed: Radiologic studies CT scan of the chest from March 9, 2022 shows significant emphysema and bronchiectasis.  I reviewed the imaging myself.             Assessment and Plan    Diagnoses and all orders for this visit:    1. Ex-smoker (Primary)    2. Bronchiectasis without complication  -     albuterol sulfate  (90 Base) MCG/ACT inhaler; Inhale 2 puffs Every 4 (Four) Hours As Needed for Wheezing.  Dispense: 1 g; Refill: 5  -     Fluticasone-Umeclidin-Vilant 200-62.5-25 MCG/ACT aerosol powder ; Inhale 1 puff Daily.  Dispense: 3 each; Refill: 3  -     nystatin (MYCOSTATIN) [id number] UNIT/GM cream; Apply 1 application  topically to the appropriate area as directed 2 (Two) Times a Day.  Dispense: 15 g; Refill: 0  -     nystatin (MYCOSTATIN) 100,000 unit/mL suspension; Take 2 mL by mouth 4 (Four) Times a Day.  Dispense: 280 mL; Refill: 2  -     Pneumococcal Conjugate Vaccine 20-Valent (PCV20)    3. Pulmonary emphysema, unspecified emphysema type  -     albuterol sulfate  (90 Base) MCG/ACT inhaler; Inhale 2 puffs Every 4 (Four) Hours As  Needed for Wheezing.  Dispense: 1 g; Refill: 5  -     Fluticasone-Umeclidin-Vilant 200-62.5-25 MCG/ACT aerosol powder ; Inhale 1 puff Daily.  Dispense: 3 each; Refill: 3  -     nystatin (MYCOSTATIN) [id number] UNIT/GM cream; Apply 1 application  topically to the appropriate area as directed 2 (Two) Times a Day.  Dispense: 15 g; Refill: 0  -     nystatin (MYCOSTATIN) 100,000 unit/mL suspension; Take 2 mL by mouth 4 (Four) Times a Day.  Dispense: 280 mL; Refill: 2  -     Pneumococcal Conjugate Vaccine 20-Valent (PCV20)    4. Fungal infection of skin  -     albuterol sulfate  (90 Base) MCG/ACT inhaler; Inhale 2 puffs Every 4 (Four) Hours As Needed for Wheezing.  Dispense: 1 g; Refill: 5  -     Fluticasone-Umeclidin-Vilant 200-62.5-25 MCG/ACT aerosol powder ; Inhale 1 puff Daily.  Dispense: 3 each; Refill: 3  -     nystatin (MYCOSTATIN) [id number] UNIT/GM cream; Apply 1 application  topically to the appropriate area as directed 2 (Two) Times a Day.  Dispense: 15 g; Refill: 0  -     nystatin (MYCOSTATIN) 100,000 unit/mL suspension; Take 2 mL by mouth 4 (Four) Times a Day.  Dispense: 280 mL; Refill: 2  -     Pneumococcal Conjugate Vaccine 20-Valent (PCV20)    5. Thrush, oral  -     albuterol sulfate  (90 Base) MCG/ACT inhaler; Inhale 2 puffs Every 4 (Four) Hours As Needed for Wheezing.  Dispense: 1 g; Refill: 5  -     Fluticasone-Umeclidin-Vilant 200-62.5-25 MCG/ACT aerosol powder ; Inhale 1 puff Daily.  Dispense: 3 each; Refill: 3  -     nystatin (MYCOSTATIN) [id number] UNIT/GM cream; Apply 1 application  topically to the appropriate area as directed 2 (Two) Times a Day.  Dispense: 15 g; Refill: 0  -     nystatin (MYCOSTATIN) 100,000 unit/mL suspension; Take 2 mL by mouth 4 (Four) Times a Day.  Dispense: 280 mL; Refill: 2  -     Pneumococcal Conjugate Vaccine 20-Valent (PCV20)    6. Encounter for immunization  -     Pneumococcal Conjugate Vaccine 20-Valent (PCV20)    7. Positive QuantiFERON-TB Gold test    8.  Obstructive sleep apnea    9. Hypoxia      Emphysema and bronchiectasis: Continue with trilogy Ellipta once daily.  Increase dose to 200 mg once daily.  I advised him to use albuterol inhaler prior to doing heavy activities.  He has benefited significantly from pulmonary rehab.  Advised him to continue with pulmonary rehab as much as possible.  Advised him to continue physical fitness locally on the days that he does not go to pulmonary rehab.  Once pulmonary rehab is completed, if he is not able to travel, I advised him to do workouts in the local physical center as much as possible.  I have made a referral for pulmonary rehab again today.    COPD exacerbation action plan was discussed in detail.  Patient has not needed any antibiotics or steroids for her lung issues over the last several months.    He had positive QuantiFERON gold test in past.  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  Bronchoscopy cultures were negative for Vail's disease.  Patient is much improved clinically and has not been treated for latent disease so far.    Difficulty with airway clearance: Continue with smart vest twice a day.  On bad days, he can use it up to 4 times a day.    Obstructive sleep apnea: Continue with CPAP machine.  We will review the CPAP usage data once it is available.  Weight loss counseling was done.  Sleep hygiene was discussed in detail.    Nystatin swish and swallow and nystatin cream for fungal infection.    He does not smoke anymore.  He is up-to-date on flu vaccine, pneumonia vaccine and Covid vaccine including booster shot.    Follow Up   Return in about 6 months (around 3/7/2024).  Patient was given instructions and counseling regarding his condition or for health maintenance advice. Please see specific information pulled into the AVS if appropriate.       Electronically signed by Garrison Bolye MD, 9/7/2023, 12:22 EDT.

## 2023-09-22 RX ORDER — FLUTICASONE FUROATE, UMECLIDINIUM BROMIDE AND VILANTEROL TRIFENATATE 200; 62.5; 25 UG/1; UG/1; UG/1
200 POWDER RESPIRATORY (INHALATION) DAILY
Qty: 2 EACH | Refills: 0 | COMMUNITY
Start: 2023-09-22 | End: 2023-09-23

## 2023-09-29 RX ORDER — BUDESONIDE 0.5 MG/2ML
INHALANT ORAL
Qty: 60 EACH | Refills: 11 | OUTPATIENT
Start: 2023-09-29

## 2023-09-29 RX ORDER — FORMOTEROL FUMARATE DIHYDRATE 20 UG/2ML
SOLUTION RESPIRATORY (INHALATION)
Qty: 60 EACH | Refills: 11 | OUTPATIENT
Start: 2023-09-29

## 2023-09-29 RX ORDER — REVEFENACIN 175 UG/3ML
SOLUTION RESPIRATORY (INHALATION)
Refills: 11 | OUTPATIENT
Start: 2023-09-29

## 2023-12-12 DIAGNOSIS — J43.9 PULMONARY EMPHYSEMA, UNSPECIFIED EMPHYSEMA TYPE: ICD-10-CM

## 2023-12-12 DIAGNOSIS — J47.9 BRONCHIECTASIS WITHOUT COMPLICATION: ICD-10-CM

## 2023-12-12 DIAGNOSIS — B37.0 THRUSH, ORAL: ICD-10-CM

## 2023-12-12 DIAGNOSIS — B36.9 FUNGAL INFECTION OF SKIN: ICD-10-CM

## 2023-12-12 RX ORDER — NYSTATIN 100000 U/G
1 CREAM TOPICAL 2 TIMES DAILY
Qty: 15 G | Refills: 0 | Status: SHIPPED | OUTPATIENT
Start: 2023-12-12

## 2023-12-12 NOTE — TELEPHONE ENCOUNTER
Patient came in asking for a refill of this cream. States he uses it around his mouth due to his cpap

## 2023-12-19 RX ORDER — FLUTICASONE FUROATE, UMECLIDINIUM BROMIDE AND VILANTEROL TRIFENATATE 200; 62.5; 25 UG/1; UG/1; UG/1
25 POWDER RESPIRATORY (INHALATION) DAILY
Qty: 2 EACH | Refills: 0 | COMMUNITY
Start: 2023-12-19 | End: 2023-12-20

## 2024-02-08 ENCOUNTER — TELEPHONE (OUTPATIENT)
Dept: PULMONOLOGY | Facility: CLINIC | Age: 79
End: 2024-02-08
Payer: MEDICARE

## 2024-02-08 DIAGNOSIS — J43.9 PULMONARY EMPHYSEMA, UNSPECIFIED EMPHYSEMA TYPE: ICD-10-CM

## 2024-02-08 DIAGNOSIS — B37.0 THRUSH, ORAL: ICD-10-CM

## 2024-02-08 DIAGNOSIS — B36.9 FUNGAL INFECTION OF SKIN: ICD-10-CM

## 2024-02-08 DIAGNOSIS — J47.9 BRONCHIECTASIS WITHOUT COMPLICATION: ICD-10-CM

## 2024-02-08 NOTE — TELEPHONE ENCOUNTER
Patient came in and is asking for a refill of Nystatin be sent in to [facility] [place]. Thank You

## 2024-03-20 ENCOUNTER — HOSPITAL ENCOUNTER (OUTPATIENT)
Dept: OTHER | Facility: HOSPITAL | Age: 79
Discharge: HOME | End: 2024-03-20

## 2024-03-20 ENCOUNTER — OFFICE VISIT (OUTPATIENT)
Dept: PULMONOLOGY | Facility: CLINIC | Age: 79
End: 2024-03-20
Payer: MEDICARE

## 2024-03-20 VITALS
DIASTOLIC BLOOD PRESSURE: 95 MMHG | SYSTOLIC BLOOD PRESSURE: 157 MMHG | HEART RATE: 70 BPM | HEIGHT: 70 IN | OXYGEN SATURATION: 92 % | WEIGHT: 192.4 LBS | BODY MASS INDEX: 27.54 KG/M2 | RESPIRATION RATE: 18 BRPM

## 2024-03-20 DIAGNOSIS — R06.02 SHORTNESS OF BREATH: ICD-10-CM

## 2024-03-20 DIAGNOSIS — Z87.891 EX-SMOKER: ICD-10-CM

## 2024-03-20 DIAGNOSIS — B36.9 FUNGAL INFECTION OF SKIN: ICD-10-CM

## 2024-03-20 DIAGNOSIS — B37.0 THRUSH, ORAL: ICD-10-CM

## 2024-03-20 DIAGNOSIS — R76.12 POSITIVE QUANTIFERON-TB GOLD TEST: ICD-10-CM

## 2024-03-20 DIAGNOSIS — J43.9 PULMONARY EMPHYSEMA, UNSPECIFIED EMPHYSEMA TYPE: Primary | ICD-10-CM

## 2024-03-20 DIAGNOSIS — J47.9 BRONCHIECTASIS WITHOUT COMPLICATION: ICD-10-CM

## 2024-03-20 DIAGNOSIS — G47.33 OBSTRUCTIVE SLEEP APNEA: ICD-10-CM

## 2024-03-20 RX ORDER — NYSTATIN 100000 U/G
1 CREAM TOPICAL 2 TIMES DAILY
Qty: 15 G | Refills: 0 | Status: SHIPPED | OUTPATIENT
Start: 2024-03-20

## 2024-03-20 RX ORDER — MONTELUKAST SODIUM 10 MG/1
10 TABLET ORAL NIGHTLY
Qty: 90 TABLET | Refills: 3 | Status: SHIPPED | OUTPATIENT
Start: 2024-03-20

## 2024-03-20 RX ORDER — ATORVASTATIN CALCIUM 20 MG/1
20 TABLET, FILM COATED ORAL DAILY
COMMUNITY

## 2024-03-20 RX ORDER — DOXYCYCLINE HYCLATE 100 MG/1
1 CAPSULE ORAL EVERY 12 HOURS SCHEDULED
COMMUNITY
Start: 2024-03-04

## 2024-03-20 RX ORDER — AMMONIUM LACTATE 12 G/100G
LOTION TOPICAL
COMMUNITY
Start: 2023-12-14

## 2024-03-20 NOTE — PROGRESS NOTES
Primary Care Provider  Aaron Sandoval MD     Referring Provider  No ref. provider found     Chief Complaint  Emphysema, Sleep Apnea, Shortness of Breath, and Follow-up (Pt here for 6 month follow up)    Subjective          Rafael Salcedo presents to [facility]  Sleep Apnea    Emphysema    Shortness of Breath    Rafael Salcedo is a 78 y.o. male patient with history of heavy smoking in past, 58-pack-year smoking, severe emphysema and bronchiectasis, history of staph aureus and Pseudomonas pneumonia, difficulty with airway clearance, obstructive sleep apnea.  He is here for follow-up.  Since his last office visit, patient has been on Trelegy Ellipta once daily.  He has albuterol inhaler and nebulizer at home but rarely uses it.  He still has shortness of breath with heavy exertion.  Cough is at baseline.  He has slowed down with age but has been able to golf, mow his own grass.  He does have issues going up and down the stairs or doing heavy activities.  He has a smart vest at home which he uses once a day.  He used to go to pulmonary rehab, currently tries to be as active as possible at home.  He is able to move up and down the stairs.  He has no changes in weight or appetite.  Previous CT scan of the chest and PFT was reviewed with him today..  He feels like he is doing better.  He is currently on CPAP machine.  I reviewed his CPAP usage data.  DME company is [employer].   He is up-to-date on Covid vaccine, flu and pneumonia vaccine.     Review of Systems   Respiratory:  Positive for shortness of breath.         General:  No Fatigue, No Fever No weight loss or loss of appetite  HEENT: No dysphagia, No Visual Changes, no rhinorrhea  Respiratory:  + cough,+Dyspnea, intermittent mucoid phlegm, No Pleuritic Pain, no wheezing, no hemoptysis.  Cardiovascular: Denies chest pain, denies palpitations,+YUEN, No Chest Pressure  Gastrointestinal:  No Abdominal Pain, No  Nausea, No Vomiting, No Diarrhea  Genitourinary:  No Dysuria, No Frequency, No Hesitancy  Musculoskeletal: No muscle pain or swelling  Endocrine:  No Heat Intolerance, No Cold Intolerance  Hematologic:  No Bleeding, No Bruising  Psychiatric:  No Anxiety, No Depression  Neurologic:  No Confusion, no Dysarthria, No Headaches  Skin:  No Rash, No Open Wounds    Family History   Problem Relation Age of Onset    Cancer Mother     Heart failure Father     Cancer Brother     Cancer Maternal Uncle     Malig Hyperthermia Neg Hx         Social History     Socioeconomic History    Marital status:     Highest education level: High school graduate   Tobacco Use    Smoking status: Former     Current packs/day: 0.00     Average packs/day: 1 pack/day for 58.0 years (58.0 ttl pk-yrs)     Types: Cigarettes     Start date: 9/2/1962     Quit date: 8/15/2020     Years since quitting: 3.5    Smokeless tobacco: Never    Tobacco comments:     1-6 cigg a day quit 2020   Vaping Use    Vaping status: Never Used   Substance and Sexual Activity    Alcohol use: Not Currently    Drug use: Never    Sexual activity: Defer        Past Medical History:   Diagnosis Date    Emphysema of lung     Seasonal allergies     Shortness of breath     Sleep apnea     Tremors of nervous system     essential tremors        Immunization History   Administered Date(s) Administered    COVID-19 (MODERNA) 1st,2nd,3rd Dose Monovalent 02/02/2021, 02/26/2021    COVID-19 (MODERNA) Monovalent Original Booster 02/02/2021, 02/26/2021, 03/28/2022    Fluad Quad 65+ 11/25/2022    Pneumococcal Conjugate 13-Valent (PCV13) 01/04/2022    Pneumococcal Conjugate 20-Valent (PCV20) 09/07/2023         No Known Allergies       Current Outpatient Medications:     atorvastatin (LIPITOR) 20 MG tablet, Take 1 tablet by mouth Daily., Disp: , Rfl:     Cholecalciferol (Vitamin D) 50 MCG (2000 UT) tablet, Take 1 tablet by mouth Daily., Disp: , Rfl:     doxycycline (VIBRAMYCIN) 100 MG  capsule, Take 1 capsule by mouth Every 12 (Twelve) Hours., Disp: , Rfl:     Fluticasone-Umeclidin-Vilant (TRELEGY ELLIPTA) 200-62.5-25 MCG/ACT inhaler, Inhale 1 puff Daily., Disp: 3 each, Rfl: 3    furosemide (LASIX) 20 MG tablet, Take 1 tablet by mouth Daily., Disp: , Rfl:     levalbuterol (XOPENEX HFA) 45 MCG/ACT inhaler, 2 puffs Every 4 (Four) Hours As Needed for Wheezing., Disp: , Rfl:     montelukast (SINGULAIR) 10 MG tablet, Take 1 tablet by mouth Every Night., Disp: 90 tablet, Rfl: 3    nystatin (MYCOSTATIN) 351610 UNIT/GM cream, Apply 1 Application topically to the appropriate area as directed 2 (Two) Times a Day., Disp: 15 g, Rfl: 0    primidone (MYSOLINE) 250 MG tablet, Take 1 tablet by mouth 2 (two) times a day., Disp: , Rfl:     Testosterone Cypionate (DEPOTESTOTERONE CYPIONATE) 200 MG/ML injection, Inject 0.25 mL into the appropriate muscle as directed by prescriber Every 28 (Twenty-Eight) Days., Disp: , Rfl:     Turmeric 500 MG capsule, Take  by mouth., Disp: , Rfl:     albuterol sulfate  (90 Base) MCG/ACT inhaler, Inhale 2 puffs Every 4 (Four) Hours As Needed for Wheezing. (Patient not taking: Reported on 3/20/2024), Disp: 1 g, Rfl: 5    ammonium lactate (LAC-HYDRIN) 12 % lotion, APPLY TOPICALLY TO DRY SKIN (Patient not taking: Reported on 3/20/2024), Disp: , Rfl:     ascorbic acid (VITAMIN C) 1000 MG tablet, Take 1 tablet by mouth Daily. (Patient not taking: Reported on 3/20/2024), Disp: , Rfl:     mupirocin (BACTROBAN) 2 % ointment, APPLY OINTMENT TOPICALLY TO AFFECTED AREA THREE TIMES DAILY FOR 2 WEEKS (Patient not taking: Reported on 3/20/2024), Disp: , Rfl:     nystatin (MYCOSTATIN) 100,000 unit/mL suspension, Take 2 mL by mouth 4 (Four) Times a Day. (Patient not taking: Reported on 3/20/2024), Disp: 280 mL, Rfl: 2    predniSONE (DELTASONE) 20 MG tablet, Take 2 tablets by mouth Daily. (Patient not taking: Reported on 3/20/2024), Disp: 14 tablet, Rfl: 0    RSVPreF3 Vac Recomb Adjuvanted  "(AREXVY) 120 MCG/0.5ML reconstituted suspension injection, Inject 0.5 mL into the appropriate muscle as directed by prescriber 1 (One) Time for 1 dose., Disp: 0.5 mL, Rfl: 0    triamcinolone (KENALOG) 0.1 % cream, APPLY DAILY AS NEEDED (Patient not taking: Reported on 3/20/2024), Disp: , Rfl:      Objective   Vital Signs:   /95 (BP Location: Left arm, Patient Position: Sitting, Cuff Size: Adult)   Pulse 70   Resp 18   Ht 177.8 cm (70\")   Wt 87.3 kg (192 lb 6.4 oz)   SpO2 92% Comment: room air/Nocturnal O2/ 3 liters  BMI 27.61 kg/m²         Mallampatti classification : 1  Physical Exam  Vital Signs Reviewed  WDWN, Alert, in no acute distress, normal conversational   HEENT:  PERRL, EOMI.  OP, nares clear, no sinus tenderness  Neck:  Supple, no JVD, no thyromegaly  Lymph: no axillary, cervical, supraclavicular lymphadenopathy noted bilaterally  Chest:  good aeration, bilateral diminished breath sounds, no wheezing, crackles or rhonchi, resonant to percussion b/l  CV: RRR, no MGR, pulses 2+, equal.  Abd:  Soft, NT, ND, + BS, no HSM  EXT:  no clubbing, no cyanosis, No BLE edema  Neuro:  A&Ox3, CN grossly intact, no focal deficits.  Skin: No rashes or lesions noted     Result Review :   The following data was reviewed by: Garrison Boyle MD on 04/06/2022:          CBC w/diff      CBC w/Diff 9/3/21   WBC 7.77   RBC 4.63   Hemoglobin 15.3   Hematocrit 43.6   MCV 94.2   MCH 33.0   MCHC 35.1   RDW 13.2   Platelets 255   Neutrophil Rel % 67.2   Immature Granulocyte Rel % 0.6 (A)   Lymphocyte Rel % 19.8   Monocyte Rel % 7.1   Eosinophil Rel % 4.5   Basophil Rel % 0.8   (A) Abnormal value              Data reviewed: Radiologic studies CT scan of the chest from March 9, 2022 shows significant emphysema and bronchiectasis.  I reviewed the imaging myself.             Assessment and Plan    Diagnoses and all orders for this visit:    1. Pulmonary emphysema, unspecified emphysema type (Primary)  -     montelukast " (SINGULAIR) 10 MG tablet; Take 1 tablet by mouth Every Night.  Dispense: 90 tablet; Refill: 3  -     Fluticasone-Umeclidin-Vilant (TRELEGY ELLIPTA) 200-62.5-25 MCG/ACT inhaler; Inhale 1 puff Daily.  Dispense: 3 each; Refill: 3  -     nystatin (MYCOSTATIN) 435384 UNIT/GM cream; Apply 1 Application topically to the appropriate area as directed 2 (Two) Times a Day.  Dispense: 15 g; Refill: 0  -     RSVPreF3 Vac Recomb Adjuvanted (AREXVY) 120 MCG/0.5ML reconstituted suspension injection; Inject 0.5 mL into the appropriate muscle as directed by prescriber 1 (One) Time for 1 dose.  Dispense: 0.5 mL; Refill: 0    2. Bronchiectasis without complication  -     montelukast (SINGULAIR) 10 MG tablet; Take 1 tablet by mouth Every Night.  Dispense: 90 tablet; Refill: 3  -     Fluticasone-Umeclidin-Vilant (TRELEGY ELLIPTA) 200-62.5-25 MCG/ACT inhaler; Inhale 1 puff Daily.  Dispense: 3 each; Refill: 3  -     nystatin (MYCOSTATIN) [id number] UNIT/GM cream; Apply 1 Application topically to the appropriate area as directed 2 (Two) Times a Day.  Dispense: 15 g; Refill: 0  -     RSVPreF3 Vac Recomb Adjuvanted (AREXVY) 120 MCG/0.5ML reconstituted suspension injection; Inject 0.5 mL into the appropriate muscle as directed by prescriber 1 (One) Time for 1 dose.  Dispense: 0.5 mL; Refill: 0    3. Obstructive sleep apnea  -     RSVPreF3 Vac Recomb Adjuvanted (AREXVY) 120 MCG/0.5ML reconstituted suspension injection; Inject 0.5 mL into the appropriate muscle as directed by prescriber 1 (One) Time for 1 dose.  Dispense: 0.5 mL; Refill: 0    4. Ex-smoker  -     RSVPreF3 Vac Recomb Adjuvanted (AREXVY) 120 MCG/0.5ML reconstituted suspension injection; Inject 0.5 mL into the appropriate muscle as directed by prescriber 1 (One) Time for 1 dose.  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  Dispense: 0.5 mL; Refill: 0    6. Shortness of breath  -     montelukast (SINGULAIR) 10 MG tablet; Take 1 tablet by mouth Every Night.  Dispense: 90 tablet; Refill: 3  -     RSVPreF3 Vac Recomb Adjuvanted (AREXVY) 120 MCG/0.5ML reconstituted suspension injection; Inject 0.5 mL into the appropriate muscle as directed by prescriber 1 (One) Time for 1 dose.  Dispense: 0.5 mL; Refill: 0    7. Fungal infection of skin  -     Fluticasone-Umeclidin-Vilant (TRELEGY ELLIPTA) 200-62.5-25 MCG/ACT inhaler; Inhale 1 puff Daily.  Dispense: 3 each; Refill: 3  -     nystatin (MYCOSTATIN) 000787 UNIT/GM cream; Apply 1 Application topically to the appropriate area as directed 2 (Two) Times a Day.  Dispense: 15 g; Refill: 0  -     RSVPreF3 Vac Recomb Adjuvanted (AREXVY) 120 MCG/0.5ML reconstituted suspension injection; Inject 0.5 mL into the appropriate muscle as directed by prescriber 1 (One) Time for 1 dose.  Dispense: 0.5 mL; Refill: 0    8. Thrush, oral  -     Fluticasone-Umeclidin-Vilant (TRELEGY ELLIPTA) 200-62.5-25 MCG/ACT inhaler; Inhale 1 puff Daily.  Dispense: 3 each; Refill: 3  -     nystatin (MYCOSTATIN) [id number] UNIT/GM cream; Apply 1 Application topically to the appropriate area as directed 2 (Two) Times a Day.  Dispense: 15 g; Refill: 0  -     RSVPreF3 Vac Recomb Adjuvanted (AREXVY) 120 MCG/0.5ML reconstituted suspension injection; Inject 0.5 mL into the appropriate muscle as directed by prescriber 1 (One) Time for 1 dose.  Dispense: 0.5 mL; Refill: 0        Emphysema and bronchiectasis: Continue with trilogy Ellipta 200 mcg once daily.  I advised him to use albuterol inhaler prior to doing heavy activities.  He has benefited significantly from pulmonary rehab.  Continue to be as active as possible.  Advised him to continue physical fitness locally on the days that he does not go to pulmonary rehab.  I advised him to do workouts in the local physical center as much as  possible.     COPD exacerbation action plan was discussed in detail.  Patient has not needed any antibiotics or steroids for her lung issues over the last several months.    He had positive QuantiFERON gold test in past.  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  Bronchoscopy cultures were negative for Vail's disease.  Patient is much improved clinically and has not been treated for latent disease so far.    Difficulty with airway clearance: Continue with smart vest twice a day.  On bad days, he can use it up to 4 times a day.    Obstructive sleep apnea: Continue with CPAP machine.  We will review the CPAP usage data once it is available.  His average daily use has been 7 hours 33 minutes, AutoPap 5 to 15 cm of water.  Median pressure is at 7.1 cm of water.  Apnea-hypopnea index on it is 0.4.  Weight loss counseling was done.  Sleep hygiene was discussed in detail.    Nystatin swish and swallow and nystatin cream for fungal infection.    He does not smoke anymore.  He is up-to-date on flu vaccine, pneumonia vaccine and Covid vaccine including booster shot.  I recommended RSV vaccine.  I have sent it to local pharmacy to for him.    Follow Up   Return in about 6 months (around 9/20/2024).  Patient was given instructions and counseling regarding his condition or for health maintenance advice. Please see specific information pulled into the AVS if appropriate.       Electronically signed by Garrison Boyle MD, 3/20/2024, 11:24 EDT.

## 2024-04-23 ENCOUNTER — TELEPHONE (OUTPATIENT)
Dept: PULMONOLOGY | Facility: CLINIC | Age: 79
End: 2024-04-23

## 2024-04-23 DIAGNOSIS — B37.0 THRUSH, ORAL: ICD-10-CM

## 2024-04-23 DIAGNOSIS — J43.9 PULMONARY EMPHYSEMA, UNSPECIFIED EMPHYSEMA TYPE: ICD-10-CM

## 2024-04-23 DIAGNOSIS — B36.9 FUNGAL INFECTION OF SKIN: ICD-10-CM

## 2024-04-23 DIAGNOSIS — J47.9 BRONCHIECTASIS WITHOUT COMPLICATION: ICD-10-CM

## 2024-04-23 NOTE — TELEPHONE ENCOUNTER
Caller: Rafael Salcedo      Relationship: SELF     Best call back number: [phone]      Who are you requesting to speak with (clinical staff, provider,  specific staff member):      What was the call regarding: PT CALLED STATING HIS ALBUTEROL IS  BUT IS WONDERING IF HE CAN NO LONGER TAKE IT OR IF IT IS STILL OKAY TO TAKE. WOULD LIKE A CALL BACK REGARDING THIS. ALSO STATES IF HE DOES NOT ANSWER IT IS OKAY TO LEAVE DETAILED MESSAGE ON VOICEMAIL.

## 2024-04-24 DIAGNOSIS — J43.9 PULMONARY EMPHYSEMA, UNSPECIFIED EMPHYSEMA TYPE: ICD-10-CM

## 2024-04-24 DIAGNOSIS — J47.9 BRONCHIECTASIS WITHOUT COMPLICATION: Primary | ICD-10-CM

## 2024-04-24 DIAGNOSIS — R06.02 SHORTNESS OF BREATH: ICD-10-CM

## 2024-04-24 RX ORDER — ALBUTEROL SULFATE 2.5 MG/3ML
2.5 SOLUTION RESPIRATORY (INHALATION) 4 TIMES DAILY PRN
Start: 2024-04-24

## 2024-04-25 NOTE — TELEPHONE ENCOUNTER
Albuterol nebs sent to [facility]. Confirmation scanned into media. Tried to call and notify patient. No answer per verbal release left detailed message that albuterol nebs have been sent to [facility]

## 2024-06-17 RX ORDER — LEVALBUTEROL TARTRATE 45 UG/1
2 AEROSOL, METERED ORAL EVERY 4 HOURS PRN
Qty: 15 G | Refills: 0 | Status: SHIPPED | OUTPATIENT
Start: 2024-06-17

## 2024-07-26 ENCOUNTER — TELEPHONE (OUTPATIENT)
Dept: PULMONOLOGY | Facility: CLINIC | Age: 79
End: 2024-07-26

## 2024-07-26 NOTE — TELEPHONE ENCOUNTER
Hub staff attempted to follow warm transfer process and was unsuccessful     Caller: Rafael Salcedo    Relationship to patient: Self    Best call back number: [phone]     Patient is needing: PT NEEDS TO KNOW WHERE HIS CPAP MACHINE CAME FROM, HE'S FORGOTTEN.   PLEASE CALL PT BACK TO [facility].

## 2024-07-29 NOTE — TELEPHONE ENCOUNTER
"Per Clement with [employer] \"he called in on 6/17/24 stating the silicone piece inside the water chamber was misshapen with all the use. He went into the [facility] office on 6/24/24 and it looks like they got it taken care of. I will call and make sure there wasn't anything else he was needing\"  "

## 2024-10-03 ENCOUNTER — OFFICE VISIT (OUTPATIENT)
Dept: PULMONOLOGY | Facility: CLINIC | Age: 79
End: 2024-10-03
Payer: MEDICARE

## 2024-10-03 VITALS
DIASTOLIC BLOOD PRESSURE: 82 MMHG | BODY MASS INDEX: 27.49 KG/M2 | TEMPERATURE: 97.5 F | HEART RATE: 76 BPM | HEIGHT: 70 IN | OXYGEN SATURATION: 90 % | RESPIRATION RATE: 18 BRPM | WEIGHT: 192 LBS | SYSTOLIC BLOOD PRESSURE: 175 MMHG

## 2024-10-03 DIAGNOSIS — R76.12 POSITIVE QUANTIFERON-TB GOLD TEST: ICD-10-CM

## 2024-10-03 DIAGNOSIS — G47.33 OBSTRUCTIVE SLEEP APNEA: ICD-10-CM

## 2024-10-03 DIAGNOSIS — R09.02 HYPOXIA: ICD-10-CM

## 2024-10-03 DIAGNOSIS — B36.9 FUNGAL INFECTION OF SKIN: ICD-10-CM

## 2024-10-03 DIAGNOSIS — R06.02 SHORTNESS OF BREATH: ICD-10-CM

## 2024-10-03 DIAGNOSIS — Z87.891 EX-SMOKER: ICD-10-CM

## 2024-10-03 DIAGNOSIS — B37.0 THRUSH, ORAL: ICD-10-CM

## 2024-10-03 DIAGNOSIS — J43.9 PULMONARY EMPHYSEMA, UNSPECIFIED EMPHYSEMA TYPE: Primary | ICD-10-CM

## 2024-10-03 DIAGNOSIS — J47.9 BRONCHIECTASIS WITHOUT COMPLICATION: ICD-10-CM

## 2024-10-03 RX ORDER — MONTELUKAST SODIUM 10 MG/1
10 TABLET ORAL NIGHTLY
Qty: 90 TABLET | Refills: 3 | Status: SHIPPED | OUTPATIENT
Start: 2024-10-03

## 2024-10-03 RX ORDER — LEVALBUTEROL TARTRATE 45 UG/1
2 AEROSOL, METERED ORAL EVERY 4 HOURS PRN
Qty: 15 G | Refills: 0 | Status: SHIPPED | OUTPATIENT
Start: 2024-10-03

## 2024-10-03 NOTE — PROGRESS NOTES
Primary Care Provider  Aaron Sandoval MD     Referring Provider  No ref. provider found     Chief Complaint  Follow-up (6 Months) and Pulmonary emphysema, unspecified emphysema type    Subjective          Rafael Salcedo presents to [facility]  History of Present Illness  Rafael Salcedo is a 79 y.o. 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 He is here for a follow-up visit.    He reports feeling better in recent weeks after a challenging period in August 2024 when he was diagnosed with pneumonia and COVID-19. It took him approximately 3 weeks to recover from these conditions. During his bout with COVID-19, he required antibiotics and steroids. He used oxygen instead of the CPAP during his pneumonia episode. He completed pulmonary rehab at [facility] and tries to walk as much as possible.    His current medications include Trelegy Ellipta 200 mcg once daily, albuterol as needed, and montelukast, which he recently refilled. He uses Trelegy and albuterol inhalers, the latter of which he uses as needed, sometimes up to 2 or 3 times a day. He also uses a chest vest, typically once a day, and does not use a nebulizer machine. He had issues with his CPAP machine leaking water, but this has since been resolved. He uses oxygen at 2 L with his CPAP machine.    He also has obstructive sleep apnea. He often feels congested in cool weather and wonders if he has allergies. He plans to get his influenza vaccine in mid-November 2024.    Review of Systems     General:  No Fatigue, No Fever No weight loss or loss of appetite  HEENT: No dysphagia, No Visual Changes, no rhinorrhea  Respiratory:  + cough,+Dyspnea, scant phlegm, No Pleuritic Pain, no wheezing, no hemoptysis.  Cardiovascular: Denies chest pain, denies  palpitations,+YUEN, No Chest Pressure  Gastrointestinal:  No Abdominal Pain, No Nausea, No Vomiting, No Diarrhea  Genitourinary:  No Dysuria, No Frequency, No Hesitancy  Musculoskeletal: No muscle pain or swelling  Endocrine:  No Heat Intolerance, No Cold Intolerance  Hematologic:  No Bleeding, No Bruising  Psychiatric:  No Anxiety, No Depression  Neurologic:  No Confusion, no Dysarthria, No Headaches  Skin:  No Rash, No Open Wounds    Family History   Problem Relation Age of Onset    Cancer Mother     Heart failure Father     Cancer Brother     Cancer Maternal Uncle     Malig Hyperthermia Neg Hx         Social History     Socioeconomic History    Marital status:     Highest education level: High school graduate   Tobacco Use    Smoking status: Former     Current packs/day: 0.00     Average packs/day: 1 pack/day for 58.0 years (58.0 ttl pk-yrs)     Types: Cigarettes     Start date: 1962     Quit date: 8/15/2020     Years since quittin.1    Smokeless tobacco: Never    Tobacco comments:     1-6 cigg a day quit    Vaping Use    Vaping status: Never Used   Substance and Sexual Activity    Alcohol use: Not Currently    Drug use: Never    Sexual activity: Defer        Past Medical History:   Diagnosis Date    Emphysema of lung     Seasonal allergies     Shortness of breath     Sleep apnea     Tremors of nervous system     essential tremors        Immunization History   Administered Date(s) Administered    COVID-19 (MODERNA) 1st,2nd,3rd Dose Monovalent 2021, 2021    COVID-19 (MODERNA) Monovalent Original Booster 2021, 2021, 2022    Fluad Quad 65+ 2022    Pneumococcal Conjugate 13-Valent (PCV13) 2022    Pneumococcal Conjugate 20-Valent (PCV20) 2023         No Known Allergies       Current Outpatient Medications:     albuterol (PROVENTIL) (2.5 MG/3ML) 0.083% nebulizer solution, Take 2.5 mg by nebulization 4 (Four) Times a Day As Needed for Wheezing or  Shortness of Air., Disp: , Rfl:     atorvastatin (LIPITOR) 20 MG tablet, Take 1 tablet by mouth Daily., Disp: , Rfl:     Cholecalciferol (Vitamin D) 50 MCG (2000 UT) tablet, Take 1 tablet by mouth Daily., Disp: , Rfl:     doxycycline (VIBRAMYCIN) 100 MG capsule, Take 1 capsule by mouth Every 12 (Twelve) Hours., Disp: , Rfl:     Fluticasone-Umeclidin-Vilant (TRELEGY ELLIPTA) 200-62.5-25 MCG/ACT inhaler, Inhale 1 puff Daily., Disp: 3 each, Rfl: 3    furosemide (LASIX) 20 MG tablet, Take 1 tablet by mouth Daily., Disp: , Rfl:     levalbuterol (XOPENEX HFA) 45 MCG/ACT inhaler, Inhale 2 puffs Every 4 (Four) Hours As Needed for Wheezing., Disp: 15 g, Rfl: 0    montelukast (SINGULAIR) 10 MG tablet, Take 1 tablet by mouth Every Night., Disp: 90 tablet, Rfl: 3    nystatin (MYCOSTATIN) 295803 UNIT/GM cream, Apply 1 Application topically to the appropriate area as directed 2 (Two) Times a Day., Disp: 15 g, Rfl: 0    primidone (MYSOLINE) 250 MG tablet, Take 1 tablet by mouth 2 (two) times a day., Disp: , Rfl:     Testosterone Cypionate (DEPOTESTOTERONE CYPIONATE) 200 MG/ML injection, Inject 0.25 mL into the appropriate muscle as directed by prescriber Every 28 (Twenty-Eight) Days., Disp: , Rfl:     Turmeric 500 MG capsule, Take  by mouth., Disp: , Rfl:     ammonium lactate (LAC-HYDRIN) 12 % lotion, APPLY TOPICALLY TO DRY SKIN (Patient not taking: Reported on 3/20/2024), Disp: , Rfl:     ascorbic acid (VITAMIN C) 1000 MG tablet, Take 1 tablet by mouth Daily. (Patient not taking: Reported on 3/20/2024), Disp: , Rfl:     mupirocin (BACTROBAN) 2 % ointment, APPLY OINTMENT TOPICALLY TO AFFECTED AREA THREE TIMES DAILY FOR 2 WEEKS (Patient not taking: Reported on 3/20/2024), Disp: , Rfl:     nystatin (MYCOSTATIN) 100,000 unit/mL suspension, Take 2 mL by mouth 4 (Four) Times a Day. (Patient not taking: Reported on 3/20/2024), Disp: 280 mL, Rfl: 2    predniSONE (DELTASONE) 20 MG tablet, Take 2 tablets by mouth Daily. (Patient not  "taking: Reported on 3/20/2024), Disp: 14 tablet, Rfl: 0    RSVPreF3 Vac Recomb Adjuvanted (AREXVY) 120 MCG/0.5ML reconstituted suspension injection, Inject 0.5 mL into the appropriate muscle as directed by prescriber 1 (One) Time for 1 dose., Disp: 0.5 mL, Rfl: 0    triamcinolone (KENALOG) 0.1 % cream, APPLY DAILY AS NEEDED (Patient not taking: Reported on 3/20/2024), Disp: , Rfl:      Objective   Physical Exam  Physical Exam  No thrush observed in the HEENT.    Vital Signs:   /82 (BP Location: Left arm, Patient Position: Sitting, Cuff Size: Adult)   Pulse 76   Temp 97.5 °F (36.4 °C) (Temporal)   Resp 18   Ht 177.8 cm (70\")   Wt 87.1 kg (192 lb)   SpO2 90% Comment: Room air  BMI 27.55 kg/m²       Vital Signs Reviewed  WDWN, Alert, NAD.   HEENT:  PERRL, EOMI.  OP, nares clear, no sinus tenderness  Mallampatti classification : 1  Neck:  Supple, no JVD, no thyromegaly  Lymph: no axillary, cervical, supraclavicular lymphadenopathy noted bilaterally  Chest:  good aeration, bilateral diminished breath sounds, no wheezing, crackles or rhonchi, resonant to percussion b/l  CV: RRR, no MGR, pulses 2+, equal.  Abd:  Soft, NT, ND, + BS, no HSM  EXT:  no clubbing, no cyanosis, No BLE edema  Neuro:  A&Ox3, CN grossly intact, no focal deficits.  Skin: No rashes or lesions noted     Result Review :   The following data was reviewed by: Garrison Boyle MD on 10/03/2024:        Data reviewed : Radiologic studies Previous imaging reviewed.     Results  Testing  Apnea hypopnea index is 0.2.             Assessment and Plan    Diagnoses and all orders for this visit:    1. Pulmonary emphysema, unspecified emphysema type (Primary)  -     montelukast (SINGULAIR) 10 MG tablet; Take 1 tablet by mouth Every Night.  Dispense: 90 tablet; Refill: 3  -     levalbuterol (XOPENEX HFA) 45 MCG/ACT inhaler; Inhale 2 puffs Every 4 (Four) Hours As Needed for Wheezing.  Dispense: 15 g; Refill: 0  -     Fluticasone-Umeclidin-Vilant (TRELEGY " ELLIPTA) 200-62.5-25 MCG/ACT inhaler; Inhale 1 puff Daily.  Dispense: 3 each; Refill: 3  -     RSVPreF3 Vac Recomb Adjuvanted (AREXVY) 120 MCG/0.5ML reconstituted suspension injection; Inject 0.5 mL into the appropriate muscle as directed by prescriber 1 (One) Time for 1 dose.  Dispense: 0.5 mL; Refill: 0    2. Bronchiectasis without complication  -     montelukast (SINGULAIR) 10 MG tablet; Take 1 tablet by mouth Every Night.  Dispense: 90 tablet; Refill: 3  -     levalbuterol (XOPENEX HFA) 45 MCG/ACT inhaler; Inhale 2 puffs Every 4 (Four) Hours As Needed for Wheezing.  Dispense: 15 g; Refill: 0  -     Fluticasone-Umeclidin-Vilant (TRELEGY ELLIPTA) 200-62.5-25 MCG/ACT inhaler; Inhale 1 puff Daily.  Dispense: 3 each; Refill: 3  -     RSVPreF3 Vac Recomb Adjuvanted (AREXVY) 120 MCG/0.5ML reconstituted suspension injection; Inject 0.5 mL into the appropriate muscle as directed by prescriber 1 (One) Time for 1 dose.  Dispense: 0.5 mL; Refill: 0    3. Obstructive sleep apnea  -     montelukast (SINGULAIR) 10 MG tablet; Take 1 tablet by mouth Every Night.  Dispense: 90 tablet; Refill: 3  -     levalbuterol (XOPENEX HFA) 45 MCG/ACT inhaler; Inhale 2 puffs Every 4 (Four) Hours As Needed for Wheezing.  Dispense: 15 g; Refill: 0  -     Fluticasone-Umeclidin-Vilant (TRELEGY ELLIPTA) 200-62.5-25 MCG/ACT inhaler; Inhale 1 puff Daily.  Dispense: 3 each; Refill: 3  -     RSVPreF3 Vac Recomb Adjuvanted (AREXVY) 120 MCG/0.5ML reconstituted suspension injection; Inject 0.5 mL into the appropriate muscle as directed by prescriber 1 (One) Time for 1 dose.  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  Dispense: 90 tablet; Refill: 3  -     levalbuterol (XOPENEX HFA) 45 MCG/ACT inhaler; Inhale 2 puffs Every 4 (Four) Hours As Needed for Wheezing.  Dispense: 15 g; Refill: 0  -     Fluticasone-Umeclidin-Vilant (TRELEGY ELLIPTA)  200-62.5-25 MCG/ACT inhaler; Inhale 1 puff Daily.  Dispense: 3 each; Refill: 3  -     RSVPreF3 Vac Recomb Adjuvanted (AREXVY) 120 MCG/0.5ML reconstituted suspension injection; Inject 0.5 mL into the appropriate muscle as directed by prescriber 1 (One) Time for 1 dose.  Dispense: 0.5 mL; Refill: 0    5. Ex-smoker  -     montelukast (SINGULAIR) 10 MG tablet; Take 1 tablet by mouth Every Night.  Dispense: 90 tablet; Refill: 3  -     levalbuterol (XOPENEX HFA) 45 MCG/ACT inhaler; Inhale 2 puffs Every 4 (Four) Hours As Needed for Wheezing.  Dispense: 15 g; Refill: 0  -     Fluticasone-Umeclidin-Vilant (TRELEGY ELLIPTA) 200-62.5-25 MCG/ACT inhaler; Inhale 1 puff Daily.  Dispense: 3 each; Refill: 3  -     RSVPreF3 Vac Recomb Adjuvanted (AREXVY) 120 MCG/0.5ML reconstituted suspension injection; Inject 0.5 mL into the appropriate muscle as directed by prescriber 1 (One) Time for 1 dose.  Dispense: 0.5 mL; Refill: 0    6. Hypoxia  -     montelukast (SINGULAIR) 10 MG tablet; Take 1 tablet by mouth Every Night.  Dispense: 90 tablet; Refill: 3  -     levalbuterol (XOPENEX HFA) 45 MCG/ACT inhaler; Inhale 2 puffs Every 4 (Four) Hours As Needed for Wheezing.  Dispense: 15 g; Refill: 0  -     Fluticasone-Umeclidin-Vilant (TRELEGY ELLIPTA) 200-62.5-25 MCG/ACT inhaler; Inhale 1 puff Daily.  Dispense: 3 each; Refill: 3  -     RSVPreF3 Vac Recomb Adjuvanted (AREXVY) 120 MCG/0.5ML reconstituted suspension injection; Inject 0.5 mL into the appropriate muscle as directed by prescriber 1 (One) Time for 1 dose.  Dispense: 0.5 mL; Refill: 0    7. Shortness of breath  -     montelukast (SINGULAIR) 10 MG tablet; Take 1 tablet by mouth Every Night.  Dispense: 90 tablet; Refill: 3  -     levalbuterol (XOPENEX HFA) 45 MCG/ACT inhaler; Inhale 2 puffs Every 4 (Four) Hours As Needed for Wheezing.  Dispense: 15 g; Refill: 0  -     Fluticasone-Umeclidin-Vilant (TRELEGY ELLIPTA) 200-62.5-25 MCG/ACT inhaler; Inhale 1 puff Daily.  Dispense: 3 each;  Refill: 3  -     RSVPreF3 Vac Recomb Adjuvanted (AREXVY) 120 MCG/0.5ML reconstituted suspension injection; Inject 0.5 mL into the appropriate muscle as directed by prescriber 1 (One) Time for 1 dose.  Dispense: 0.5 mL; Refill: 0    8. Fungal infection of skin  -     montelukast (SINGULAIR) 10 MG tablet; Take 1 tablet by mouth Every Night.  Dispense: 90 tablet; Refill: 3  -     levalbuterol (XOPENEX HFA) 45 MCG/ACT inhaler; Inhale 2 puffs Every 4 (Four) Hours As Needed for Wheezing.  Dispense: 15 g; Refill: 0  -     Fluticasone-Umeclidin-Vilant (TRELEGY ELLIPTA) 200-62.5-25 MCG/ACT inhaler; Inhale 1 puff Daily.  Dispense: 3 each; Refill: 3  -     RSVPreF3 Vac Recomb Adjuvanted (AREXVY) 120 MCG/0.5ML reconstituted suspension injection; Inject 0.5 mL into the appropriate muscle as directed by prescriber 1 (One) Time for 1 dose.  Dispense: 0.5 mL; Refill: 0    9. Thrush, oral  -     montelukast (SINGULAIR) 10 MG tablet; Take 1 tablet by mouth Every Night.  Dispense: 90 tablet; Refill: 3  -     levalbuterol (XOPENEX HFA) 45 MCG/ACT inhaler; Inhale 2 puffs Every 4 (Four) Hours As Needed for Wheezing.  Dispense: 15 g; Refill: 0  -     Fluticasone-Umeclidin-Vilant (TRELEGY ELLIPTA) 200-62.5-25 MCG/ACT inhaler; Inhale 1 puff Daily.  Dispense: 3 each; Refill: 3  -     RSVPreF3 Vac Recomb Adjuvanted (AREXVY) 120 MCG/0.5ML reconstituted suspension injection; Inject 0.5 mL into the appropriate muscle as directed by prescriber 1 (One) Time for 1 dose.  Dispense: 0.5 mL; Refill: 0      Assessment & Plan  1. Chronic Obstructive Pulmonary Disease (COPD).  He reports feeling better over the last few weeks after a rough month in August with pneumonia and COVID-19. He is currently using Trelegy Ellipta 200 mcg once daily and albuterol as needed. He was advised to use the chest vest twice daily, and up to four times daily during severe pneumonia episodes. He was also encouraged to maintain regular physical activity to improve lung  function and prevent recurrent infections. No nebulizer use was reported. Prescriptions for montelukast and Trelegy were renewed.    2. Obstructive Sleep Apnea.  He is using a CPAP machine with no current issues after replacing a leaking water tank. His Apnea-Hypopnea Index (AHI) is 0.2, which is within the normal range. He uses oxygen at 2 L with the CPAP machine, as recommended by his daughter, a nurse, due to the long hose requiring more pressure.    3. Pneumonia.  He had pneumonia in August and required antibiotics and steroids. He was advised to use the chest vest more frequently during such episodes to help clear secretions.    4. COVID-19.  He had COVID-19 in August and required antibiotics and steroids. He has since recovered and is feeling better.    5. Health Maintenance.  The influenza vaccine was recommended for administration in mid to late October 2024. The RSV vaccine was also recommended, and a prescription was sent to his pharmacy.    Follow-up  Return in 6 months for follow up.    Follow Up   Return in about 6 months (around 4/3/2025).  Patient was given instructions and counseling regarding his condition or for health maintenance advice. Please see specific information pulled into the AVS if appropriate.     Patient or patient representative verbalized consent for the use of Ambient Listening during the visit with  Garrison Boyle MD for chart documentation. 10/3/2024  07:58 EDT    Electronically signed by Garrison Boyle MD, 10/3/2024, 08:31 EDT.

## 2024-10-18 DIAGNOSIS — B37.0 THRUSH, ORAL: ICD-10-CM

## 2024-10-18 DIAGNOSIS — J47.9 BRONCHIECTASIS WITHOUT COMPLICATION: ICD-10-CM

## 2024-10-18 DIAGNOSIS — J43.9 PULMONARY EMPHYSEMA, UNSPECIFIED EMPHYSEMA TYPE: ICD-10-CM

## 2024-10-18 DIAGNOSIS — B36.9 FUNGAL INFECTION OF SKIN: ICD-10-CM

## 2024-10-18 RX ORDER — NYSTATIN 100000 U/G
CREAM TOPICAL
Qty: 15 G | Refills: 0 | Status: SHIPPED | OUTPATIENT
Start: 2024-10-18

## 2024-11-25 ENCOUNTER — OFFICE VISIT (OUTPATIENT)
Dept: PULMONOLOGY | Facility: CLINIC | Age: 79
End: 2024-11-25
Payer: MEDICARE

## 2024-11-25 VITALS
OXYGEN SATURATION: 90 % | DIASTOLIC BLOOD PRESSURE: 84 MMHG | BODY MASS INDEX: 27.89 KG/M2 | RESPIRATION RATE: 16 BRPM | WEIGHT: 194.8 LBS | HEART RATE: 84 BPM | HEIGHT: 70 IN | SYSTOLIC BLOOD PRESSURE: 142 MMHG

## 2024-11-25 DIAGNOSIS — I11.0 HYPERTENSIVE HEART DISEASE WITH HEART FAILURE: ICD-10-CM

## 2024-11-25 DIAGNOSIS — G47.33 OBSTRUCTIVE SLEEP APNEA: Primary | ICD-10-CM

## 2024-11-25 DIAGNOSIS — Z87.891 EX-SMOKER: ICD-10-CM

## 2024-11-25 DIAGNOSIS — J43.9 PULMONARY EMPHYSEMA, UNSPECIFIED EMPHYSEMA TYPE: ICD-10-CM

## 2024-11-25 DIAGNOSIS — J47.9 BRONCHIECTASIS WITHOUT COMPLICATION: ICD-10-CM

## 2024-11-25 DIAGNOSIS — R09.02 HYPOXIA: ICD-10-CM

## 2024-11-25 RX ORDER — LEVALBUTEROL TARTRATE 45 UG/1
1-2 AEROSOL, METERED ORAL EVERY 4 HOURS PRN
COMMUNITY

## 2024-11-25 NOTE — PROGRESS NOTES
Primary Care Provider  Aaron aSndoval MD     Referring Provider  No ref. provider found     Chief Complaint  Pulmonary emphysema, unspecified emphysema type; Cough; Shortness of Breath; Wheezing; Follow-up; and Emphysema    Subjective          Rafael Salcedo presents to [facility] PULMONARY [facility] MEDICINE  History of Present Illness  Rafael Salcedo is a 79 y.o. 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 He is here for a follow-up visit.    He has been experiencing cough, shortness of breath, greenish phlegm, and wheezing. He contracted a cold nearly a month ago, which has since resolved, but he continues to experience lung tightness. Despite two rounds of antibiotics and steroids, he struggles with physical activities such as walking from the kitchen to the bedroom or living room due to breathlessness. His house has two levels, and climbing stairs quickly leaves him winded. His cough has returned to its pre-illness state. He uses a smart vest 2 to 3 times a day and takes Trelegy Ellipta daily and albuterol as needed. He does not use a nebulizer. He reports no leg swelling.    He had a bronchoscopy performed by Dr. Marrufo, which revealed some bacteria, for which he was treated. He is unsure if another bronchoscopy is necessary. His ankles have started to swell. He had a chest x-ray on 11/04/2024, which showed no pneumonia but did reveal significant mucus. He has received two steroid injections and two antibiotics. He recently started taking VV clear, which improved his condition.    He is accompanied by an adult female.    IMMUNIZATIONS  He is up to date on his pneumonia vaccine.    Review of Systems     General:  No Fatigue, No Fever No weight loss or loss of appetite  HEENT: No dysphagia, No Visual Changes, no rhinorrhea  Respiratory:  +  cough,+Dyspnea, yellowish phlegm, No Pleuritic Pain, no wheezing, no hemoptysis.  Cardiovascular: Denies chest pain, denies palpitations,+YUEN, No Chest Pressure  Gastrointestinal:  No Abdominal Pain, No Nausea, No Vomiting, No Diarrhea  Genitourinary:  No Dysuria, No Frequency, No Hesitancy  Musculoskeletal: No muscle pain or swelling  Endocrine:  No Heat Intolerance, No Cold Intolerance  Hematologic:  No Bleeding, No Bruising  Psychiatric:  No Anxiety, No Depression  Neurologic:  No Confusion, no Dysarthria, No Headaches  Skin:  No Rash, No Open Wounds    Family History   Problem Relation Age of Onset    Cancer Mother     Heart failure Father     Cancer Brother     Cancer Maternal Uncle     Malig Hyperthermia Neg Hx         Social History     Socioeconomic History    Marital status:     Highest education level: High school graduate   Tobacco Use    Smoking status: Former     Current packs/day: 0.00     Average packs/day: 1 pack/day for 58.0 years (58.0 ttl pk-yrs)     Types: Cigarettes     Start date: 1962     Quit date: 8/15/2020     Years since quittin.2     Passive exposure: Past    Smokeless tobacco: Never    Tobacco comments:     1-6 cigg a day quit    Vaping Use    Vaping status: Never Used   Substance and Sexual Activity    Alcohol use: Not Currently    Drug use: Never    Sexual activity: Defer        Past Medical History:   Diagnosis Date    Emphysema of lung     Seasonal allergies     Shortness of breath     Sleep apnea     Tremors of nervous system     essential tremors        Immunization History   Administered Date(s) Administered    COVID-19 (MODERNA) 1st,2nd,3rd Dose Monovalent 2021, 2021    COVID-19 (MODERNA) Monovalent Original Booster 2021, 2021, 2022    Fluad Quad 65+ 2022    Pneumococcal Conjugate 13-Valent (PCV13) 2022    Pneumococcal Conjugate 20-Valent (PCV20) 2023         No Known Allergies       Current Outpatient  Medications:     albuterol (PROVENTIL) (2.5 MG/3ML) 0.083% nebulizer solution, Take 2.5 mg by nebulization 4 (Four) Times a Day As Needed for Wheezing or Shortness of Air., Disp: , Rfl:     atorvastatin (LIPITOR) 20 MG tablet, Take 1 tablet by mouth Daily., Disp: , Rfl:     Cholecalciferol (Vitamin D) 50 MCG (2000 UT) tablet, Take 1 tablet by mouth Daily., Disp: , Rfl:     Fluticasone-Umeclidin-Vilant (TRELEGY ELLIPTA) 200-62.5-25 MCG/ACT inhaler, Inhale 1 puff Daily., Disp: 3 each, Rfl: 3    furosemide (LASIX) 20 MG tablet, Take 1 tablet by mouth Daily., Disp: , Rfl:     levalbuterol (XOPENEX HFA) 45 MCG/ACT inhaler, Inhale 2 puffs Every 4 (Four) Hours As Needed for Wheezing., Disp: 15 g, Rfl: 0    levalbuterol (XOPENEX HFA) 45 MCG/ACT inhaler, Inhale 1-2 puffs Every 4 (Four) Hours As Needed for Wheezing., Disp: , Rfl:     montelukast (SINGULAIR) 10 MG tablet, Take 1 tablet by mouth Every Night., Disp: 90 tablet, Rfl: 3    nystatin (MYCOSTATIN) 405405 UNIT/GM cream, APPLY  CREAM TOPICALLY TO AFFECTED AREA TWICE DAILY AS DIRECTED, Disp: 15 g, Rfl: 0    primidone (MYSOLINE) 250 MG tablet, Take 1 tablet by mouth 2 (two) times a day., Disp: , Rfl:     Testosterone Cypionate (DEPOTESTOTERONE CYPIONATE) 200 MG/ML injection, Inject 0.25 mL into the appropriate muscle as directed by prescriber Every 28 (Twenty-Eight) Days., Disp: , Rfl:     Turmeric 500 MG capsule, Take  by mouth., Disp: , Rfl:     ammonium lactate (LAC-HYDRIN) 12 % lotion, APPLY TOPICALLY TO DRY SKIN (Patient not taking: Reported on 11/25/2024), Disp: , Rfl:     ascorbic acid (VITAMIN C) 1000 MG tablet, Take 1 tablet by mouth Daily. (Patient not taking: Reported on 11/25/2024), Disp: , Rfl:     doxycycline (VIBRAMYCIN) 100 MG capsule, Take 1 capsule by mouth Every 12 (Twelve) Hours. (Patient not taking: Reported on 11/25/2024), Disp: , Rfl:     mupirocin (BACTROBAN) 2 % ointment, APPLY OINTMENT TOPICALLY TO AFFECTED AREA THREE TIMES DAILY FOR 2 WEEKS  "(Patient not taking: Reported on 11/25/2024), Disp: , Rfl:     nystatin (MYCOSTATIN) 100,000 unit/mL suspension, Take 2 mL by mouth 4 (Four) Times a Day. (Patient not taking: Reported on 11/25/2024), Disp: 280 mL, Rfl: 2    predniSONE (DELTASONE) 20 MG tablet, Take 2 tablets by mouth Daily. (Patient not taking: Reported on 11/25/2024), Disp: 14 tablet, Rfl: 0    triamcinolone (KENALOG) 0.1 % cream, APPLY DAILY AS NEEDED (Patient not taking: Reported on 11/25/2024), Disp: , Rfl:      Objective   Physical Exam  Physical Exam      Vital Signs:   /84 (BP Location: Left arm, Patient Position: Sitting, Cuff Size: Large Adult)   Pulse 84   Resp 16   Ht 177.8 cm (70\")   Wt 88.4 kg (194 lb 12.8 oz)   SpO2 90% Comment: room air  BMI 27.95 kg/m²       Vital Signs Reviewed  WDWN, Alert, in mild distress, has conversational dyspnea  HEENT:  PERRL, EOMI.  OP, nares clear, no sinus tenderness  Mallampatti classification : 1  Neck:  Supple, no JVD, no thyromegaly  Lymph: no axillary, cervical, supraclavicular lymphadenopathy noted bilaterally  Chest:  good aeration, bilateral diminished breath sounds, no wheezing, crackles, has scattered rhonchi, resonant to percussion b/l  CV: RRR, no MGR, pulses 2+, equal.  Abd:  Soft, NT, ND, + BS, no HSM  EXT:  no clubbing, no cyanosis, No BLE edema  Neuro:  A&Ox3, CN grossly intact, no focal deficits  Skin: No rashes or lesions noted     Result Review :   The following data was reviewed by: Garrison Boyle MD on 11/25/2024:        Data reviewed : Radiologic studies previous imaging reviewed.     Results               Assessment and Plan    Diagnoses and all orders for this visit:    1. Obstructive sleep apnea (Primary)  -     Respiratory Culture - Sputum, Cough; Future  -     C-reactive Protein; Future  -     BNP; Future    2. Ex-smoker  -     Respiratory Culture - Sputum, Cough; Future  -     C-reactive Protein; Future  -     BNP; Future    3. Pulmonary emphysema, unspecified " emphysema type  -     Respiratory Culture - Sputum, Cough; Future  -     C-reactive Protein; Future  -     BNP; Future    4. Bronchiectasis without complication  -     Respiratory Culture - Sputum, Cough; Future  -     C-reactive Protein; Future  -     BNP; Future    5. Hypoxia  -     Respiratory Culture - Sputum, Cough; Future  -     C-reactive Protein; Future  -     BNP; Future    6. Hypertensive heart disease with heart failure  -     BNP; Future      Assessment & Plan  1. Chronic Obstructive Pulmonary Disease (COPD).  The patient has a history of COPD and bronchiectasis with recurrent exacerbations. He reports persistent cough, shortness of breath, and greenish phlegm despite two rounds of antibiotics and steroids. He uses Trelegy Ellipta daily and albuterol as needed. He is advised to use the nebulizer with albuterol at least three times a day. A chest x-ray, blood work, and sputum culture will be conducted to check for any ongoing infection or other issues. If there is no improvement by Monday, a bronchoscopy may be considered.  Check BNP, CRP.   Check sputum CX.    2. Medication Management.  A prescription for albuterol will be provided as the patient reported not having any. He is currently using Trelegy Ellipta and will continue with it.    Follow-up 2 weeks.   Call next Monday if not feeling better.    Follow Up   Return in about 2 months (around 1/25/2025).  Patient was given instructions and counseling regarding his condition or for health maintenance advice. Please see specific information pulled into the AVS if appropriate.     Patient or patient representative verbalized consent for the use of Ambient Listening during the visit with  Garrison Boyle MD for chart documentation. 11/25/2024  11:39 EST    Electronically signed by Garrison Boyle MD, 11/25/2024, 12:07 EST.

## 2024-11-26 ENCOUNTER — LAB (OUTPATIENT)
Dept: LAB | Facility: HOSPITAL | Age: 79
End: 2024-11-26
Payer: MEDICARE

## 2024-11-26 DIAGNOSIS — R09.02 HYPOXIA: ICD-10-CM

## 2024-11-26 DIAGNOSIS — J47.9 BRONCHIECTASIS WITHOUT COMPLICATION: ICD-10-CM

## 2024-11-26 DIAGNOSIS — J43.9 PULMONARY EMPHYSEMA, UNSPECIFIED EMPHYSEMA TYPE: ICD-10-CM

## 2024-11-26 DIAGNOSIS — Z87.891 EX-SMOKER: ICD-10-CM

## 2024-11-26 DIAGNOSIS — G47.33 OBSTRUCTIVE SLEEP APNEA: ICD-10-CM

## 2024-11-26 DIAGNOSIS — I11.0 HYPERTENSIVE HEART DISEASE WITH HEART FAILURE: ICD-10-CM

## 2024-11-26 LAB
CRP SERPL-MCNC: 1.52 MG/DL (ref 0–0.5)
NT-PROBNP SERPL-MCNC: 107 PG/ML (ref 0–1800)

## 2024-11-26 PROCEDURE — 83880 ASSAY OF NATRIURETIC PEPTIDE: CPT

## 2024-11-26 PROCEDURE — 87070 CULTURE OTHR SPECIMN AEROBIC: CPT

## 2024-11-26 PROCEDURE — 36415 COLL VENOUS BLD VENIPUNCTURE: CPT

## 2024-11-26 PROCEDURE — 86140 C-REACTIVE PROTEIN: CPT

## 2024-11-26 PROCEDURE — 87186 SC STD MICRODIL/AGAR DIL: CPT

## 2024-11-26 PROCEDURE — 87077 CULTURE AEROBIC IDENTIFY: CPT

## 2024-12-02 ENCOUNTER — TELEPHONE (OUTPATIENT)
Dept: PULMONOLOGY | Facility: CLINIC | Age: 79
End: 2024-12-02

## 2024-12-02 NOTE — TELEPHONE ENCOUNTER
Caller: Rafael Salcedo    Relationship: Self    Best call back number: [phone]    Caller requesting test results: LAB RESULTS     What test was performed: LABS    When was the test performed: 11/26/2024    Additional notes: PATIENT IS WANTING A CALL BACK TO GO OVER LAB RESULTS. HE IS ALSO WANTING TO KNOW IF DR. TOMAS NEEDS A COPY OF HIS CHEST X-RAY, BACK AND SIDE, THAT WAS COMPLETED ON 11/4/2024 AT PCP OFFICE, DR. WRYA. PATIENT STATES THAT DR. TOMAS WAS GOING TO SEE IF HE COULD ACCESS THEM. IF NOT PATIENT WILL BRING THEM OVER.   PLEASE CALL TO ADVISE

## 2024-12-05 LAB
BACTERIA SPEC RESP CULT: ABNORMAL
BACTERIA SPEC RESP CULT: ABNORMAL
GRAM STN SPEC: ABNORMAL

## 2024-12-12 ENCOUNTER — TELEPHONE (OUTPATIENT)
Dept: PULMONOLOGY | Facility: CLINIC | Age: 79
End: 2024-12-12

## 2024-12-12 NOTE — TELEPHONE ENCOUNTER
Caller: Rafael Salcedo    Relationship to patient: Self    Best call back number: [phone] (home)       Patient is needing: REQUESTING A CALL FROM JANIA

## 2024-12-13 NOTE — TELEPHONE ENCOUNTER
Spoke with patient about home health assistance with home infusion.  [facility] contacted.

## 2024-12-16 ENCOUNTER — TELEPHONE (OUTPATIENT)
Dept: PULMONOLOGY | Facility: CLINIC | Age: 79
End: 2024-12-16
Payer: MEDICARE

## 2024-12-16 NOTE — TELEPHONE ENCOUNTER
Spoke with patient.  He states he is doing well with his infusion and will not need home health.  Dressing change is scheduled at  on Friday, 12/20/24.  Advised order to discontinue IV access will be sent over as well.  Patient is agreeable.

## 2024-12-30 ENCOUNTER — TELEPHONE (OUTPATIENT)
Dept: PULMONOLOGY | Facility: CLINIC | Age: 79
End: 2024-12-30

## 2024-12-30 NOTE — TELEPHONE ENCOUNTER
Caller: Rafael Salcedo     Relationship: SELF    Best call back number: [phone]     What is your medical concern? PT WAS IN THE HOSPITAL AND WAS TREATED FOR A BACTERIAL INFECTION AND CAME HOME WITH A MIDLINE AND ANTIBIOTICS. PT IS WANTING TO KNOW IF HE SHOULD BE TESTED AGAIN TO MAKE SURE THAT IT IS GONE.  ALSO PT NEEDS TO HAVE A NEW SCRIPT FOR CPAP SUPPLIES AND WOULD LIKE TO BE CALLED INTO [facility] IN [place].. PT HAS A RESP MED CPAP MACHINE    How long has this issue been going on? 2 WEEKS    Is your provider already aware of this issue? YES    Have you been treated for this issue? YES

## 2024-12-30 NOTE — TELEPHONE ENCOUNTER
Called jana, the order is active, he just needs to reach out to jana. Called and spoke to patient and he is aware

## 2025-01-14 NOTE — PROGRESS NOTES
Primary Care Provider  Aaron Sandoval MD     Referring Provider  No ref. provider found     Chief Complaint  Pulmonary emphysema, unspecified emphysema type; Pneumonia; Shortness of Breath; Cough; Wheezing; and Follow-up    Subjective          History of Presenting Illness  Patient is an 79-year-old male, patient of Dr. Conway who presents for management of COPD and bronchiectasis who presents for a follow-up visit today. Last office visit patient had a sputum culture completed which grew Acinetobacterbaumannii complex and patient was treated with Ceftazadime.  Patient states that he is feeling better.  Patient is requesting to have a follow-up chest x-ray and a sputum culture to ensure that he has cleared infection.  Patient states that his breathing is at baseline.  Patient states that he does get short of breath that is worse with exertion, moderate in severity, and improved with rest.    Patient is taking Trelegy Ellipta inhaler as prescribed and uses Xopenex inhaler and albuterol nebulizer treatments as needed. Patient does have a chest vest to use to assist with airway clearance. Patient denies fever, chills, night sweats, swollen glands in the head and neck, unintentional weight loss, hemoptysis,  dysphagia, chest pain, palpitations, chest tightness, abdominal pain, nausea, vomiting, and diarrhea.  Patient also denies any myalgias, changes in sense of taste and/or smell, sore throat, any other coronavirus or flu-like symptoms.  Patient denies any leg swelling, orthopnea, paroxysmal nocturnal dyspnea.  Patient is able to perform activities of daily living.        Review of Systems     Family History   Problem Relation Age of Onset    Cancer Mother     Heart failure Father     Cancer Brother     Cancer Maternal Uncle     Malig Hyperthermia Neg Hx         Social History     Socioeconomic History    Marital status:     Highest education level: High school graduate   Tobacco Use    Smoking status:  Former     Current packs/day: 0.00     Average packs/day: 1 pack/day for 58.0 years (58.0 ttl pk-yrs)     Types: Cigarettes     Start date: 1962     Quit date: 8/15/2020     Years since quittin.4     Passive exposure: Past    Smokeless tobacco: Never    Tobacco comments:     1-6 cigg a day quit    Vaping Use    Vaping status: Never Used   Substance and Sexual Activity    Alcohol use: Not Currently    Drug use: Never    Sexual activity: Defer        Past Medical History:   Diagnosis Date    Emphysema of lung     Seasonal allergies     Shortness of breath     Sleep apnea     Tremors of nervous system     essential tremors        Immunization History   Administered Date(s) Administered    COVID-19 (MODERNA) 1st,2nd,3rd Dose Monovalent 2021, 2021    COVID-19 (MODERNA) Monovalent Original Booster 2021, 2021, 2022    Fluad Quad 65+ 2022    Pneumococcal Conjugate 13-Valent (PCV13) 2022    Pneumococcal Conjugate 20-Valent (PCV20) 2023       No Known Allergies       Current Outpatient Medications:     albuterol (PROVENTIL) (2.5 MG/3ML) 0.083% nebulizer solution, Take 2.5 mg by nebulization 4 (Four) Times a Day As Needed for Wheezing or Shortness of Air for up to 30 days., Disp: 180 each, Rfl: 5    atorvastatin (LIPITOR) 20 MG tablet, Take 1 tablet by mouth Daily., Disp: , Rfl:     Cholecalciferol (Vitamin D) 50 MCG (2000 UT) tablet, Take 1 tablet by mouth Daily., Disp: , Rfl:     Fluticasone-Umeclidin-Vilant (TRELEGY ELLIPTA) 200-62.5-25 MCG/ACT inhaler, Inhale 1 puff Daily., Disp: 3 each, Rfl: 3    furosemide (LASIX) 20 MG tablet, Take 1 tablet by mouth Daily., Disp: , Rfl:     levalbuterol (XOPENEX HFA) 45 MCG/ACT inhaler, Inhale 2 puffs Every 4 (Four) Hours As Needed for Wheezing., Disp: 15 g, Rfl: 5    montelukast (SINGULAIR) 10 MG tablet, Take 1 tablet by mouth Every Night., Disp: 90 tablet, Rfl: 3    nystatin (MYCOSTATIN) 091259 UNIT/GM cream, APPLY  CREAM  "TOPICALLY TO AFFECTED AREA TWICE DAILY AS DIRECTED, Disp: 15 g, Rfl: 0    primidone (MYSOLINE) 250 MG tablet, Take 1 tablet by mouth 2 (two) times a day., Disp: , Rfl:     Testosterone Cypionate (DEPOTESTOTERONE CYPIONATE) 200 MG/ML injection, Inject 0.25 mL into the appropriate muscle as directed by prescriber Every 28 (Twenty-Eight) Days., Disp: , Rfl:     Turmeric 500 MG capsule, Take  by mouth., Disp: , Rfl:     ammonium lactate (LAC-HYDRIN) 12 % lotion, APPLY TOPICALLY TO DRY SKIN (Patient not taking: Reported on 3/20/2024), Disp: , Rfl:     ascorbic acid (VITAMIN C) 1000 MG tablet, Take 1 tablet by mouth Daily. (Patient not taking: Reported on 3/20/2024), Disp: , Rfl:     mupirocin (BACTROBAN) 2 % ointment, APPLY OINTMENT TOPICALLY TO AFFECTED AREA THREE TIMES DAILY FOR 2 WEEKS (Patient not taking: Reported on 3/20/2024), Disp: , Rfl:     nystatin (MYCOSTATIN) 100,000 unit/mL suspension, Take 2 mL by mouth 4 (Four) Times a Day. (Patient not taking: Reported on 3/20/2024), Disp: 280 mL, Rfl: 2    triamcinolone (KENALOG) 0.1 % cream, APPLY DAILY AS NEEDED (Patient not taking: Reported on 3/20/2024), Disp: , Rfl:      Objective     Physical Exam  Vital Signs:   WDWN, Alert, NAD.    HEENT:  PERRL, EOMI.  OP, nares clear, no sinus tenderness  Neck:  Supple, no JVD, no thyromegaly.  Lymph: no axillary, cervical, supraclavicular lymphadenopathy noted bilaterally  Chest:  good aeration, clear to auscultation bilaterally, tympanic to percussion bilaterally, no work of breathing noted  CV: RRR, no MGR, pulses 2+, equal.  Abd:  Soft, NT, ND, + BS, no HSM  EXT:  no clubbing, no cyanosis, no edema, no joint tenderness  Neuro:  A&Ox3, CN grossly intact, no focal deficits.  Skin: No rashes or lesions noted.    /84 (BP Location: Left arm, Patient Position: Sitting, Cuff Size: Large Adult)   Pulse 83   Resp 16   Ht 177.8 cm (70\")   Wt 86.6 kg (191 lb)   SpO2 90% Comment: room air  BMI 27.41 kg/m²         Result " Review :   I have reviewed Dr. Boyle's last office visit note.    Procedures:           Assessment and Plan      Assessment:  1.  Severe COPD. FEV1 of 46%. Patient is on triple inhaler therapy.   2.  Bronchiectasis.  3.  Pulmonary emphysema.  4.  Hypoxia.  5.  Acinetobacterbaumannii complex infection: patient treated with Ceftazidime.   6.  Chronic dyspnea.   7.  Chronic cough.   8.  Tobacco abuse of cigarettes in remission. Not eligible for lung cancer screening.   9.  Encounter for immunization: Flu vaccine given to the patient in the office today.       Plan:  1. Will order a follow-up chest x-ray and sputum culture.  2.  Will order an updated pulmonary function test to see if we get patient restarted back in pulmonary rehab.  3.  Continue Trelegy Ellipta inhaler as prescribed and rinse mouth out after each use.  4.  Continue Xopenex inhaler and albuterol nebulizer treatments as needed.  5.  Continue Singulair.  6.  Vaccination status: Flu vaccine given to the patient in the office today.  Patient reports they are up-to-date with pneumonia and Covid vaccines.  Patient is advised to continue to follow CDC recommendations such as social distancing wearing a mask and washing hands for at least 20 seconds.  7.  Smoking status: patient is a former cigarette smoker. Not eligible for lung cancer screening.   8.  Patient to call the office, 911, or go to the ER with new or worsening symptoms.  9.  Follow-up as scheduled, sooner if needed.            Follow Up   Return for keep appt as scheduled.  Patient was given instructions and counseling regarding his condition or for health maintenance advice. Please see specific information pulled into the AVS if appropriate.

## 2025-01-16 ENCOUNTER — OFFICE VISIT (OUTPATIENT)
Dept: PULMONOLOGY | Facility: CLINIC | Age: 80
End: 2025-01-16
Payer: MEDICARE

## 2025-01-16 VITALS
RESPIRATION RATE: 16 BRPM | DIASTOLIC BLOOD PRESSURE: 84 MMHG | HEART RATE: 83 BPM | HEIGHT: 70 IN | SYSTOLIC BLOOD PRESSURE: 153 MMHG | BODY MASS INDEX: 27.35 KG/M2 | WEIGHT: 191 LBS | OXYGEN SATURATION: 90 %

## 2025-01-16 DIAGNOSIS — B37.0 THRUSH, ORAL: ICD-10-CM

## 2025-01-16 DIAGNOSIS — R09.02 HYPOXIA: ICD-10-CM

## 2025-01-16 DIAGNOSIS — J43.9 PULMONARY EMPHYSEMA, UNSPECIFIED EMPHYSEMA TYPE: Primary | ICD-10-CM

## 2025-01-16 DIAGNOSIS — F17.201 TOBACCO ABUSE, IN REMISSION: ICD-10-CM

## 2025-01-16 DIAGNOSIS — Z23 FLU VACCINE NEED: ICD-10-CM

## 2025-01-16 DIAGNOSIS — R76.12 POSITIVE QUANTIFERON-TB GOLD TEST: ICD-10-CM

## 2025-01-16 DIAGNOSIS — J47.9 BRONCHIECTASIS WITHOUT COMPLICATION: ICD-10-CM

## 2025-01-16 DIAGNOSIS — G47.33 OBSTRUCTIVE SLEEP APNEA: ICD-10-CM

## 2025-01-16 DIAGNOSIS — Z87.891 EX-SMOKER: ICD-10-CM

## 2025-01-16 DIAGNOSIS — R06.02 SHORTNESS OF BREATH: ICD-10-CM

## 2025-01-16 DIAGNOSIS — J44.9 CHRONIC OBSTRUCTIVE PULMONARY DISEASE, UNSPECIFIED COPD TYPE: ICD-10-CM

## 2025-01-16 DIAGNOSIS — Z23 ENCOUNTER FOR IMMUNIZATION: ICD-10-CM

## 2025-01-16 DIAGNOSIS — B36.9 FUNGAL INFECTION OF SKIN: ICD-10-CM

## 2025-01-16 RX ORDER — LEVALBUTEROL TARTRATE 45 UG/1
2 AEROSOL, METERED ORAL EVERY 4 HOURS PRN
Qty: 15 G | Refills: 5 | Status: SHIPPED | OUTPATIENT
Start: 2025-01-16

## 2025-01-16 RX ORDER — ALBUTEROL SULFATE 0.83 MG/ML
2.5 SOLUTION RESPIRATORY (INHALATION) 4 TIMES DAILY PRN
Qty: 180 EACH | Refills: 5
Start: 2025-01-16 | End: 2025-02-15

## 2025-01-16 NOTE — PATIENT INSTRUCTIONS
Bronchiectasis    Bronchiectasis is a condition in which the airways in the lungs (bronchi) are damaged and widened. The condition makes it hard for the lungs to get rid of mucus, and it causes mucus to gather in the bronchi. This condition often leads to lung infections, which can make the condition worse.  What are the causes?  You can be born with this condition, or you can develop it later in life. Common causes of this condition include:  Cystic fibrosis.  Repeated lung infections, such as pneumonia or tuberculosis.  An object or other blockage in the lungs.  Breathing in fluid, food, or other objects (aspiration).  A problem with the body's defense system (immune system) and lung structure that is present at birth (congenital).  Sometimes the cause is not known.  What are the signs or symptoms?  Common symptoms of this condition include:  A daily cough that brings up mucus and lasts for more than 3 weeks.  Lung infections that happen often.  Shortness of breath and wheezing.  Weakness and feeling tired (fatigue).  How is this diagnosed?  This condition is diagnosed with tests, such as:  Chest X-rays or CT scans. These are done to check for changes in the lungs.  Breathing tests. These are done to check how well your lungs are working.  A test of a sample of your saliva (sputum culture). This test is done to check for infection.  Blood tests and other tests. These are done to check for related diseases or causes.  How is this treated?  Treatment for this condition depends on the severity of the illness and its cause. Treatment may include:  Medicines that loosen mucus so it can be coughed up (mucolytics).  Medicines that relax the muscles of the bronchi (bronchodilators).  Antibiotic medicines to prevent or treat infection.  Physical therapy to help clear mucus from the lungs. Techniques may include:  Postural drainage. This is when you sit or lie in certain positions so that mucus can drain by gravity.  Chest  percussion. This involves tapping the chest or back with a cupped hand.  Chest vibration. For this therapy, a hand or special equipment vibrates your chest and back.  Surgery to remove the affected part of the lung. This may be done in severe cases.  Follow these instructions at home:  Medicines  Take over-the-counter and prescription medicines only as told by your health care provider.  If you were prescribed an antibiotic medicine, take it as told by your health care provider. Do not stop taking the antibiotic even if you start to feel better.  Avoid taking sedatives and antihistamines unless your health care provider tells you to take them. These medicines tend to thicken the mucus in the lungs.  Managing symptoms  Do breathing exercises or techniques to clear your lungs as told by your health care provider.  Consider using a cold steam vaporizer or humidifier in your room or home to help loosen secretions.  If you have a cough that gets worse at night, try sleeping in a semi-upright position.  General instructions  Get plenty of rest.  Drink enough fluid to keep your urine pale yellow.  Stay inside when pollution and ozone levels are high.  Stay up to date with vaccinations and immunizations.  Avoid cigarette smoke and other lung irritants.  Do not use any products that contain nicotine or tobacco. These products include cigarettes, chewing tobacco, and vaping devices, such as e-cigarettes. If you need help quitting, ask your health care provider.  Keep all follow-up visits. This is important.  Contact a health care provider if:  You cough up more sputum than before and the sputum is yellow or green in color.  You have a fever or chills.  You cannot control your cough and are losing sleep.  Get help right away if:  You cough up blood.  You have chest pain.  You have increasing shortness of breath.  You have pain that gets worse or is not controlled with medicines.  You have a fever and your symptoms suddenly get  worse.  These symptoms may be an emergency. Get help right away. Call 911.  Do not wait to see if the symptoms will go away.  Do not drive yourself to the hospital.  Summary  Bronchiectasis is a condition in which the airways in the lungs (bronchi) are damaged and widened. The condition makes it hard for the lungs to get rid of mucus, and it causes mucus to gather in the bronchi.  Treatment usually includes therapy to help clear mucus from the lungs.  Avoid cigarette smoke and other lung irritants.  Stay up to date with vaccinations and immunizations.  This information is not intended to replace advice given to you by your health care provider. Make sure you discuss any questions you have with your health care provider.  Document Revised: 08/31/2022 Document Reviewed: 07/19/2022  Elsevier Patient Education © 2024 [employer]

## 2025-01-20 ENCOUNTER — LAB (OUTPATIENT)
Dept: LAB | Facility: HOSPITAL | Age: 80
End: 2025-01-20
Payer: MEDICARE

## 2025-01-20 ENCOUNTER — HOSPITAL ENCOUNTER (OUTPATIENT)
Dept: GENERAL RADIOLOGY | Facility: HOSPITAL | Age: 80
Discharge: HOME | End: 2025-01-20
Payer: MEDICARE

## 2025-01-20 DIAGNOSIS — J43.9 PULMONARY EMPHYSEMA, UNSPECIFIED EMPHYSEMA TYPE: ICD-10-CM

## 2025-01-20 DIAGNOSIS — J47.9 BRONCHIECTASIS WITHOUT COMPLICATION: ICD-10-CM

## 2025-01-20 DIAGNOSIS — J44.9 CHRONIC OBSTRUCTIVE PULMONARY DISEASE, UNSPECIFIED COPD TYPE: ICD-10-CM

## 2025-01-20 DIAGNOSIS — R09.02 HYPOXIA: ICD-10-CM

## 2025-01-20 PROCEDURE — 87147 CULTURE TYPE IMMUNOLOGIC: CPT

## 2025-01-20 PROCEDURE — 87186 SC STD MICRODIL/AGAR DIL: CPT

## 2025-01-20 PROCEDURE — 71046 X-RAY EXAM CHEST 2 VIEWS: CPT

## 2025-01-20 PROCEDURE — 87205 SMEAR GRAM STAIN: CPT

## 2025-01-20 PROCEDURE — 87070 CULTURE OTHR SPECIMN AEROBIC: CPT

## 2025-01-23 LAB
BACTERIA SPEC RESP CULT: ABNORMAL
BACTERIA SPEC RESP CULT: ABNORMAL
GRAM STN SPEC: ABNORMAL

## 2025-01-23 RX ORDER — LEVOFLOXACIN 750 MG/1
750 TABLET, FILM COATED ORAL DAILY
Qty: 14 TABLET | Refills: 0 | Status: SHIPPED | OUTPATIENT
Start: 2025-01-23 | End: 2025-02-06

## 2025-01-23 RX ORDER — LINEZOLID 600 MG/1
600 TABLET, FILM COATED ORAL 2 TIMES DAILY
Qty: 28 TABLET | Refills: 0 | Status: SHIPPED | OUTPATIENT
Start: 2025-01-23 | End: 2025-02-06

## 2025-01-24 ENCOUNTER — TELEPHONE (OUTPATIENT)
Dept: PULMONOLOGY | Facility: CLINIC | Age: 80
End: 2025-01-24
Payer: MEDICARE

## 2025-01-24 DIAGNOSIS — J15.212 PNEUMONIA DUE TO METHICILLIN RESISTANT STAPHYLOCOCCUS AUREUS (MRSA), UNSPECIFIED LATERALITY, UNSPECIFIED PART OF LUNG: Primary | ICD-10-CM

## 2025-01-24 RX ORDER — DOXYCYCLINE 100 MG/1
100 CAPSULE ORAL 2 TIMES DAILY
Qty: 20 CAPSULE | Refills: 0 | Status: SHIPPED | OUTPATIENT
Start: 2025-01-24

## 2025-03-13 DIAGNOSIS — J43.9 PULMONARY EMPHYSEMA, UNSPECIFIED EMPHYSEMA TYPE: ICD-10-CM

## 2025-03-13 DIAGNOSIS — B36.9 FUNGAL INFECTION OF SKIN: ICD-10-CM

## 2025-03-13 DIAGNOSIS — B37.0 THRUSH, ORAL: ICD-10-CM

## 2025-03-13 DIAGNOSIS — J47.9 BRONCHIECTASIS WITHOUT COMPLICATION: ICD-10-CM

## 2025-03-13 RX ORDER — NYSTATIN 100000 U/G
CREAM TOPICAL
Qty: 15 G | Refills: 0 | OUTPATIENT
Start: 2025-03-13

## 2025-03-13 NOTE — TELEPHONE ENCOUNTER
Caller: Rafael Salcedo    Relationship: Self    Best call back number: [phone]     Requested Prescriptions:     nystatin (MYCOSTATIN) 083214 UNIT/GM cream        Pharmacy where request should be sent:    [facility] [address] [place], [place] [address] ANISHA RENA GUEVARA [facility] - [phone]  - [phone] FX   Last office visit with prescribing clinician: 11/25/2024   Last telemedicine visit with prescribing clinician: Visit date not found   Next office visit with prescribing clinician: 4/16/2025     Additional details provided by patient:     Does the patient have less than a 3 day supply:  [] Yes  [x] No    Janette Dill Rep   03/13/25 11:37 EDT

## 2025-03-17 DIAGNOSIS — J47.9 BRONCHIECTASIS WITHOUT COMPLICATION: ICD-10-CM

## 2025-03-17 DIAGNOSIS — J43.9 PULMONARY EMPHYSEMA, UNSPECIFIED EMPHYSEMA TYPE: ICD-10-CM

## 2025-03-17 DIAGNOSIS — B37.0 THRUSH, ORAL: ICD-10-CM

## 2025-03-17 DIAGNOSIS — B36.9 FUNGAL INFECTION OF SKIN: ICD-10-CM

## 2025-03-17 RX ORDER — NYSTATIN 100000 U/G
CREAM TOPICAL
Qty: 15 G | Refills: 0 | Status: SHIPPED | OUTPATIENT
Start: 2025-03-17

## 2025-03-19 ENCOUNTER — HOSPITAL ENCOUNTER (OUTPATIENT)
Dept: RESPIRATORY THERAPY | Facility: HOSPITAL | Age: 80
Discharge: HOME | End: 2025-03-19
Payer: MEDICARE

## 2025-03-19 DIAGNOSIS — J47.9 BRONCHIECTASIS WITHOUT COMPLICATION: ICD-10-CM

## 2025-03-19 DIAGNOSIS — J44.9 CHRONIC OBSTRUCTIVE PULMONARY DISEASE, UNSPECIFIED COPD TYPE: ICD-10-CM

## 2025-03-19 DIAGNOSIS — R09.02 HYPOXIA: ICD-10-CM

## 2025-03-19 DIAGNOSIS — J43.9 PULMONARY EMPHYSEMA, UNSPECIFIED EMPHYSEMA TYPE: ICD-10-CM

## 2025-03-19 PROCEDURE — 94060 EVALUATION OF WHEEZING: CPT

## 2025-03-19 PROCEDURE — 94726 PLETHYSMOGRAPHY LUNG VOLUMES: CPT

## 2025-03-19 PROCEDURE — 94729 DIFFUSING CAPACITY: CPT

## 2025-03-19 RX ORDER — ALBUTEROL SULFATE 0.83 MG/ML
2.5 SOLUTION RESPIRATORY (INHALATION) ONCE
Status: COMPLETED | OUTPATIENT
Start: 2025-03-19 | End: 2025-03-19

## 2025-03-19 RX ADMIN — ALBUTEROL SULFATE 2.5 MG: 2.5 SOLUTION RESPIRATORY (INHALATION) at 14:53

## 2025-04-07 NOTE — PROGRESS NOTES
Primary Care Provider  Aaron Sandoval MD   Referring Provider  No ref. provider found    Patient Complaint  Follow-up and Shortness of Breath (On exertion, just changing clothes )    Patient or patient representative verbalized consent for the use of Ambient Listening during the visit with  SUSU Garcia for chart documentation. 4/8/2025  09:05 EDT      Subjective       History of Presenting Illness  Rafael Salcedo is a pleasant 79 y.o. male  of  Dr. Boyle who presents to [facility] with history of COPD and bronchiectasis, here for follow-up appointment.  Patient was last seen 1/16/2025.  Patient had a respiratory culture done 1/20/2025 that grew Pseudomonas and Staphylococcus.  Patient was initially prescribed Levaquin and Zyvox for 14 days however antibiotic was too costly.  Therefore patient was treated with doxycycline.    Meds:Trelegy 200 mcg, albuterol nebulizer and levalbuterol inhaler  Oxygen:on 02 at 2 L PD and 2l continuous at home  Chest vest therapy:using twice a day and benefiting from its use, states has to stop to expectorate mucus at times during use of chest vest  History of Present Illness  The patient presents for evaluation of severe emphysema, bronchiectasis, and sleep apnea. Patient here with spouse.    He has a history of smoking but ceased this habit approximately 6 months prior to the onset of his current symptoms. In January 2021, he experienced a cold and was subsequently referred to a cardiologist for evaluation of potential arterial issues. The cardiologist's assessment revealed no cardiac abnormalities, leading to a referral to a pulmonologist. However, due to the COVID-19 pandemic, he was unable to secure an appointment with the pulmonologist until September 2021. He was informed by his primary care physician that he had a leaking heart valve, which could potentially be causing his symptoms. An echocardiogram performed  by his cardiologist confirmed the presence of a leaking heart valve, but it was determined not to be the cause of his symptoms. He reports persistent respiratory distress, characterized by coughing up grayish-green sputum. He also reports experiencing shortness of breath during Buddhism services, particularly when singing. He is currently using a chest vest for therapy, which he finds beneficial. He has been advised to use albuterol via nebulizer but has been unable to obtain the medication. He uses a pulse dose concentrator at home and has oxygen tanks available for use during power outages. He does not use supplemental oxygen during Buddhism services due to the noise produced by the concentrator. He reports no fever, chills, unintentional weight loss, or hemoptysis. He was treated with Zyvox and Levaquin, but due to intolerance to Levaquin, he was switched to doxycycline. A subsequent test revealed the presence of two additional bacteria, necessitating the initiation of two more antibiotics. He underwent bronchoscopy and culture in 2021, which confirmed the absence of infection.    He has sleep apnea and is using a sleep apnea machine. He tries to get his face to rest with his mask once a week.    SOCIAL HISTORY  The patient quit smoking about 6 months before January 2021.    MEDICATIONS  Current: Trelegy, albuterol, levalbuterol  Past: Zyvox, Levaquin, doxycycline     At present time patient denies  wheezing, headaches, chest pain, weight loss or hemoptysis. Patient denies fevers, chills and night sweats.    I have personally reviewed the review of systems, past family, social, medical and surgical histories; and agree with their findings.      Review of Systems   Constitutional: Negative.    HENT: Negative.     Respiratory:  Positive for cough and shortness of breath.    Cardiovascular: Negative.    Musculoskeletal: Negative.    Neurological: Negative.    Psychiatric/Behavioral: Negative.           Family History    Problem Relation Age of Onset    Cancer Mother     Heart failure Father     Cancer Brother     Cancer Maternal Uncle     Malig Hyperthermia Neg Hx         Social History     Socioeconomic History    Marital status:     Highest education level: High school graduate   Tobacco Use    Smoking status: Former     Current packs/day: 0.00     Average packs/day: 1 pack/day for 58.0 years (58.0 ttl pk-yrs)     Types: Cigarettes     Start date: 1962     Quit date: 8/15/2020     Years since quittin.6     Passive exposure: Past    Smokeless tobacco: Never    Tobacco comments:     1-6 cigg a day quit    Vaping Use    Vaping status: Never Used   Substance and Sexual Activity    Alcohol use: Not Currently    Drug use: Never    Sexual activity: Defer        Past Medical History:   Diagnosis Date    Emphysema of lung     Seasonal allergies     Shortness of breath     Sleep apnea     Tremors of nervous system     essential tremors        Immunization History   Administered Date(s) Administered    COVID-19 (MODERNA) 1st,2nd,3rd Dose Monovalent 2021, 2021    COVID-19 (MODERNA) Monovalent Original Booster 2021, 2021, 2022    Fluad Quad 65+ 2022    Fluzone High-Dose 65+YRS 2025    Pneumococcal Conjugate 13-Valent (PCV13) 2022    Pneumococcal Conjugate 20-Valent (PCV20) 2023       No Known Allergies       Current Outpatient Medications:     atorvastatin (LIPITOR) 20 MG tablet, Take 1 tablet by mouth Daily., Disp: , Rfl:     Cholecalciferol (Vitamin D) 50 MCG (2000 UT) tablet, Take 1 tablet by mouth Daily., Disp: , Rfl:     Fluticasone-Umeclidin-Vilant (TRELEGY ELLIPTA) 200-62.5-25 MCG/ACT inhaler, Inhale 1 puff Daily., Disp: 3 each, Rfl: 3    furosemide (LASIX) 20 MG tablet, Take 1 tablet by mouth Daily., Disp: , Rfl:     levalbuterol (XOPENEX HFA) 45 MCG/ACT inhaler, Inhale 2 puffs Every 4 (Four) Hours As Needed for Wheezing., Disp: 15 g, Rfl: 5    montelukast  "(SINGULAIR) 10 MG tablet, Take 1 tablet by mouth Every Night., Disp: 90 tablet, Rfl: 3    nystatin (MYCOSTATIN) 633184 UNIT/GM cream, APPLY 1 APPLICATION TOPICALLY TO AFFECTED AREA TWICE DAILY AS DIRECTED, Disp: 15 g, Rfl: 0    primidone (MYSOLINE) 250 MG tablet, Take 1 tablet by mouth 2 (two) times a day., Disp: , Rfl:     Testosterone Cypionate (DEPOTESTOTERONE CYPIONATE) 200 MG/ML injection, Inject 0.25 mL into the appropriate muscle as directed by prescriber Every 28 (Twenty-Eight) Days., Disp: , Rfl:     Turmeric 500 MG capsule, Take  by mouth., Disp: , Rfl:     albuterol (PROVENTIL) (2.5 MG/3ML) 0.083% nebulizer solution, Take 2.5 mg by nebulization 4 (Four) Times a Day As Needed for Wheezing., Disp: 360 mL, Rfl: 3    ammonium lactate (LAC-HYDRIN) 12 % lotion, APPLY TOPICALLY TO DRY SKIN (Patient not taking: Reported on 4/8/2025), Disp: , Rfl:     ascorbic acid (VITAMIN C) 1000 MG tablet, Take 1 tablet by mouth Daily. (Patient not taking: Reported on 4/8/2025), Disp: , Rfl:     mupirocin (BACTROBAN) 2 % ointment, APPLY OINTMENT TOPICALLY TO AFFECTED AREA THREE TIMES DAILY FOR 2 WEEKS (Patient not taking: Reported on 4/8/2025), Disp: , Rfl:     nystatin (MYCOSTATIN) 100,000 unit/mL suspension, Take 2 mL by mouth 4 (Four) Times a Day. (Patient not taking: Reported on 4/8/2025), Disp: 280 mL, Rfl: 2    triamcinolone (KENALOG) 0.1 % cream, APPLY DAILY AS NEEDED (Patient not taking: Reported on 4/8/2025), Disp: , Rfl:          Vital Signs   /92 (BP Location: Right arm, Patient Position: Sitting, Cuff Size: Adult)   Pulse 72   Temp 97.3 °F (36.3 °C)   Resp 16   Ht 177.8 cm (70\")   Wt 86.6 kg (191 lb)   SpO2 92%   BMI 27.41 kg/m²       Objective     Physical Exam  Vitals reviewed.   Constitutional:       General: He is not in acute distress.     Appearance: Normal appearance. He is not ill-appearing.   HENT:      Head: Normocephalic and atraumatic.      Nose: Nose normal.      Mouth/Throat:      Mouth: " Mucous membranes are moist.      Pharynx: Oropharynx is clear.   Eyes:      Extraocular Movements: Extraocular movements intact.      Conjunctiva/sclera: Conjunctivae normal.      Pupils: Pupils are equal, round, and reactive to light.   Cardiovascular:      Rate and Rhythm: Normal rate and regular rhythm.      Pulses: Normal pulses.      Heart sounds: Normal heart sounds.   Pulmonary:      Effort: Pulmonary effort is normal. No respiratory distress.      Breath sounds: Normal breath sounds. No stridor. No wheezing, rhonchi or rales.   Abdominal:      General: Bowel sounds are normal.   Musculoskeletal:         General: Normal range of motion.      Cervical back: Normal range of motion and neck supple.   Skin:     General: Skin is warm and dry.   Neurological:      Mental Status: He is alert and oriented to person, place, and time.   Psychiatric:         Behavior: Behavior normal.         Physical Exam  Lungs are clear.  Heart is regular.         Results Review  I have personally reviewed the prior office notes, hospital records, labs, and diagnostics.  XR Chest 2 View [[id number]] (Order [id number])  Order  Status: Final result     Study Notes     Hedy Hensley on 1/20/2025 10:39 AM EST   CHRONIC COUGH / SHORT OF BREATH     Appointment Information    PACS Images     Radiology Images  Study Result    Narrative & Impression   XR CHEST 2 VW     Date of Exam: 1/20/2025 10:39 AM EST     Indication: dyspnea, cough     Comparison: 7/14/2022     Findings:  Cardiac and mediastinal contours are normal. COPD is present with areas of chronic scarring in the lungs, particularly in the bases and right upper lobe. No acute infiltrates. No pneumothorax. Pulmonary vascularity is normal.     IMPRESSION:  COPD with areas of chronic scarring in the lungs. No acute findings.           Electronically Signed: Aaron Aldana MD    1/21/2025 12:21 AM EST    Workstation ID: [id number]   Results  Complete PFT - Pre & Post Bronchodilator (Order  [id number])  Order-Level Documents:    Scan on 3/19/2025 1512 by Neva Toth APRN: PULMONARY FUNCTION TEST, [facility], 03/19/2025         Author: -- Service: -- Author Type: --   Filed: Date of Service: Creation Time:   Status: (Other)     Spirometry  FEV1/FVC 38%  FEV1 45% of predicted  FVC 85% of predicted  There is a significant response to bronchodilator therapy seen        Lung volumes  Total lung capacity 90% of predicted  Residual volume 103% of predicted           Diffusion  DLCO is 26% of predicted        Interpretation  Very severe obstructive defect with response to bronchodilator therapies consistent with COPD with asthma  Lung volumes show no restriction  DLCO severely reduced               File Link    Scan on 3/19/2025 1512 by Neva Toth APRN: PULMONARY FUNCTION TEST, [facility], 03/19/2025        Key Information    Document ID File Type Document Type Description   [id number] Image PULMONARY FUNCTION TEST - SCAN PULMONARY FUNCTION TEST, [facility], 03/19/2025     Import Information    Attached At Date Time User Dept   Order Level 3/19/2025  3:12 PM Neva Toth APRN      Order    Pulmonary Function Test [[id number]]     Encounter    Hospital Encounter on 3/19/25 with [facility] PUL LAB ROOM 1     Results  Laboratory Studies  Sputum culture showed Pseudomonas and Staph infection.    Testing  Sleep apnea machine compliance report: Average usage about 7 hours and 6 minutes to 8 hours and 11 minutes. Apnea hypoxic index is 0.2.          Assessment         Patient Active Problem List   Diagnosis    Obstructive sleep apnea    Hypoxia    Ex-smoker    Chronic obstructive pulmonary disease    Bronchiectasis without complication    Positive QuantiFERON-TB Gold test    Tobacco abuse, in remission    Encounter for immunization        Plan     Diagnoses and all orders for this visit:    1. Pulmonary emphysema, unspecified emphysema type (Primary)  -     Respiratory Culture - Sputum, Cough; Future  -     AFB  Culture - , Cough; Future  -     albuterol (PROVENTIL) (2.5 MG/3ML) 0.083% nebulizer solution; Take 2.5 mg by nebulization 4 (Four) Times a Day As Needed for Wheezing.  Dispense: 360 mL; Refill: 3    2. Bronchiectasis without complication  -     Respiratory Culture - Sputum, Cough; Future  -     AFB Culture - , Cough; Future  -     albuterol (PROVENTIL) (2.5 MG/3ML) 0.083% nebulizer solution; Take 2.5 mg by nebulization 4 (Four) Times a Day As Needed for Wheezing.  Dispense: 360 mL; Refill: 3    3. Shortness of breath  -     Respiratory Culture - Sputum, Cough; Future  -     AFB Culture - , Cough; Future  -     albuterol (PROVENTIL) (2.5 MG/3ML) 0.083% nebulizer solution; Take 2.5 mg by nebulization 4 (Four) Times a Day As Needed for Wheezing.  Dispense: 360 mL; Refill: 3    4. Chronic bronchitis with productive mucopurulent cough  -     Respiratory Culture - Sputum, Cough; Future  -     AFB Culture - , Cough; Future  -     albuterol (PROVENTIL) (2.5 MG/3ML) 0.083% nebulizer solution; Take 2.5 mg by nebulization 4 (Four) Times a Day As Needed for Wheezing.  Dispense: 360 mL; Refill: 3         Assessment & Plan  1. Severe emphysema.  He is advised to continue using his chest vest and oxygen pulse dose. A prescription for albuterol nebulizer medication will be sent to [facility], with instructions to use it up to six times daily, every four hours. A sputum culture will be ordered today to check for any remaining infection. If the culture shows any growth, appropriate antibiotics will be prescribed. If symptoms persist, a bronchoscopy may be considered.    2. Bronchiectasis.  He is advised to continue using his chest vest and oxygen pulse dose. A prescription for albuterol nebulizer medication will be sent to [facility], with instructions to use it up to six times daily, every four hours. A sputum culture will be ordered today to check for any remaining infection. If the culture shows any growth, appropriate antibiotics will  be prescribed. If symptoms persist, a bronchoscopy may be considered.    3. Sleep apnea.  His compliance report indicates satisfactory usage of the sleep apnea machine, with an average usage duration of approximately 7 hours and 6 minutes to 8 hours and 11 minutes. His apnea hypoxic index is 0.2, which is within the acceptable range of less than 5. He is advised to continue using the sleep apnea machine as directed.    Follow-up  The patient will follow up in 3 months.    PROCEDURE  The patient underwent bronchoscopy and culture in 2021, which confirmed the absence of infection.      Smoking status:  reports that he quit smoking about 4 years ago. His smoking use included cigarettes. He started smoking about 62 years ago. He has a 58 pack-year smoking history. He has been exposed to tobacco smoke. He has never used smokeless tobacco.    Vaccination status: Reviewed  Immunization History   Administered Date(s) Administered    COVID-19 (MODERNA) 1st,2nd,3rd Dose Monovalent 02/02/2021, 02/26/2021    COVID-19 (MODERNA) Monovalent Original Booster 02/02/2021, 02/26/2021, 03/28/2022    Fluad Quad 65+ 11/25/2022    Fluzone High-Dose 65+YRS 01/16/2025    Pneumococcal Conjugate 13-Valent (PCV13) 01/04/2022    Pneumococcal Conjugate 20-Valent (PCV20) 09/07/2023        Medications personally reviewed    Follow Up  Return in about 3 months (around 7/8/2025) for Dr. Boyle.    Patient was given instructions and counseling regarding his condition or for health maintenance advice. Please see specific information pulled into the AVS if appropriate.     I spent 23 minutes caring for Rafael Salcedo on this date of service. This time includes time spent by me in the following activities:preparing for the visit, reviewing tests, obtaining and/or reviewing a separately obtained history, performing a medically appropriate examination and/or evaluation, counseling and educating the patient/family/caregiver, ordering medications, tests, or  procedures, documenting information in the medical record, independently interpreting results and communicating that information with the patient/family/caregiver and answered questions family members, discuss medications.

## 2025-04-08 ENCOUNTER — OFFICE VISIT (OUTPATIENT)
Dept: PULMONOLOGY | Facility: CLINIC | Age: 80
End: 2025-04-08
Payer: MEDICARE

## 2025-04-08 VITALS
SYSTOLIC BLOOD PRESSURE: 168 MMHG | BODY MASS INDEX: 27.35 KG/M2 | TEMPERATURE: 97.3 F | OXYGEN SATURATION: 92 % | HEART RATE: 72 BPM | DIASTOLIC BLOOD PRESSURE: 92 MMHG | RESPIRATION RATE: 16 BRPM | WEIGHT: 191 LBS | HEIGHT: 70 IN

## 2025-04-08 DIAGNOSIS — J47.9 BRONCHIECTASIS WITHOUT COMPLICATION: ICD-10-CM

## 2025-04-08 DIAGNOSIS — J41.1 CHRONIC BRONCHITIS WITH PRODUCTIVE MUCOPURULENT COUGH: ICD-10-CM

## 2025-04-08 DIAGNOSIS — J43.9 PULMONARY EMPHYSEMA, UNSPECIFIED EMPHYSEMA TYPE: Primary | ICD-10-CM

## 2025-04-08 DIAGNOSIS — R06.02 SHORTNESS OF BREATH: ICD-10-CM

## 2025-04-08 RX ORDER — ALBUTEROL SULFATE 0.83 MG/ML
2.5 SOLUTION RESPIRATORY (INHALATION) 4 TIMES DAILY PRN
Qty: 360 ML | Refills: 3 | Status: SHIPPED | OUTPATIENT
Start: 2025-04-08

## 2025-04-09 ENCOUNTER — LAB (OUTPATIENT)
Dept: LAB | Facility: HOSPITAL | Age: 80
End: 2025-04-09
Payer: MEDICARE

## 2025-04-09 DIAGNOSIS — R06.02 SHORTNESS OF BREATH: ICD-10-CM

## 2025-04-09 DIAGNOSIS — J43.9 PULMONARY EMPHYSEMA, UNSPECIFIED EMPHYSEMA TYPE: ICD-10-CM

## 2025-04-09 DIAGNOSIS — J47.9 BRONCHIECTASIS WITHOUT COMPLICATION: ICD-10-CM

## 2025-04-09 DIAGNOSIS — J41.1 CHRONIC BRONCHITIS WITH PRODUCTIVE MUCOPURULENT COUGH: ICD-10-CM

## 2025-04-09 PROCEDURE — 87206 SMEAR FLUORESCENT/ACID STAI: CPT

## 2025-04-09 PROCEDURE — 87205 SMEAR GRAM STAIN: CPT

## 2025-04-09 PROCEDURE — 87070 CULTURE OTHR SPECIMN AEROBIC: CPT

## 2025-04-09 PROCEDURE — 87116 MYCOBACTERIA CULTURE: CPT

## 2025-04-10 ENCOUNTER — TELEPHONE (OUTPATIENT)
Dept: PULMONOLOGY | Facility: CLINIC | Age: 80
End: 2025-04-10
Payer: MEDICARE

## 2025-04-10 DIAGNOSIS — R76.12 POSITIVE QUANTIFERON-TB GOLD TEST: ICD-10-CM

## 2025-04-10 DIAGNOSIS — B37.0 THRUSH, ORAL: ICD-10-CM

## 2025-04-10 DIAGNOSIS — J43.9 PULMONARY EMPHYSEMA, UNSPECIFIED EMPHYSEMA TYPE: ICD-10-CM

## 2025-04-10 DIAGNOSIS — J47.9 BRONCHIECTASIS WITHOUT COMPLICATION: ICD-10-CM

## 2025-04-10 DIAGNOSIS — Z87.891 EX-SMOKER: ICD-10-CM

## 2025-04-10 DIAGNOSIS — R09.02 HYPOXIA: ICD-10-CM

## 2025-04-10 DIAGNOSIS — G47.33 OBSTRUCTIVE SLEEP APNEA: ICD-10-CM

## 2025-04-10 DIAGNOSIS — R06.02 SHORTNESS OF BREATH: ICD-10-CM

## 2025-04-10 DIAGNOSIS — B36.9 FUNGAL INFECTION OF SKIN: ICD-10-CM

## 2025-04-10 RX ORDER — MONTELUKAST SODIUM 10 MG/1
10 TABLET ORAL NIGHTLY
Qty: 90 TABLET | Refills: 3 | Status: SHIPPED | OUTPATIENT
Start: 2025-04-10

## 2025-04-10 NOTE — TELEPHONE ENCOUNTER
PATIENT IS NEEDING HIS SINGULAIR REFILLED AND SENT TO [facility] IN [place].    IF YOU HAVE ANY QUESTIONS PLEASE CALL THE PATIENT.

## 2025-04-11 LAB
BACTERIA SPEC RESP CULT: NORMAL
GRAM STN SPEC: NORMAL

## 2025-04-16 LAB
MYCOBACTERIUM SPEC CULT: NORMAL
NIGHT BLUE STAIN TISS: NORMAL
NIGHT BLUE STAIN TISS: NORMAL

## 2025-04-25 ENCOUNTER — LAB (OUTPATIENT)
Facility: HOSPITAL | Age: 80
End: 2025-04-25
Payer: MEDICARE

## 2025-04-25 ENCOUNTER — OFFICE VISIT (OUTPATIENT)
Dept: PULMONOLOGY | Facility: CLINIC | Age: 80
End: 2025-04-25
Payer: MEDICARE

## 2025-04-25 ENCOUNTER — HOSPITAL ENCOUNTER (OUTPATIENT)
Facility: HOSPITAL | Age: 80
Discharge: HOME | End: 2025-04-25
Payer: MEDICARE

## 2025-04-25 VITALS
DIASTOLIC BLOOD PRESSURE: 79 MMHG | TEMPERATURE: 97.6 F | WEIGHT: 191 LBS | RESPIRATION RATE: 16 BRPM | HEART RATE: 85 BPM | SYSTOLIC BLOOD PRESSURE: 140 MMHG | BODY MASS INDEX: 27.35 KG/M2 | HEIGHT: 70 IN | OXYGEN SATURATION: 92 %

## 2025-04-25 DIAGNOSIS — J43.9 PULMONARY EMPHYSEMA, UNSPECIFIED EMPHYSEMA TYPE: ICD-10-CM

## 2025-04-25 DIAGNOSIS — R60.0 PEDAL EDEMA: Primary | ICD-10-CM

## 2025-04-25 DIAGNOSIS — J47.9 BRONCHIECTASIS WITHOUT COMPLICATION: ICD-10-CM

## 2025-04-25 DIAGNOSIS — R60.0 PEDAL EDEMA: ICD-10-CM

## 2025-04-25 DIAGNOSIS — G47.33 OBSTRUCTIVE SLEEP APNEA: ICD-10-CM

## 2025-04-25 DIAGNOSIS — R06.02 SHORTNESS OF BREATH: ICD-10-CM

## 2025-04-25 DIAGNOSIS — J96.11 CHRONIC RESPIRATORY FAILURE WITH HYPOXIA: ICD-10-CM

## 2025-04-25 DIAGNOSIS — F17.201 TOBACCO ABUSE, IN REMISSION: ICD-10-CM

## 2025-04-25 DIAGNOSIS — R05.9 COUGH, UNSPECIFIED TYPE: ICD-10-CM

## 2025-04-25 LAB
ALBUMIN SERPL-MCNC: 4.1 G/DL (ref 3.5–5.2)
ALBUMIN/GLOB SERPL: 1.2 G/DL
ALP SERPL-CCNC: 104 U/L (ref 39–117)
ALT SERPL W P-5'-P-CCNC: 16 U/L (ref 1–41)
ANION GAP SERPL CALCULATED.3IONS-SCNC: 14 MMOL/L (ref 5–15)
AST SERPL-CCNC: 23 U/L (ref 1–40)
BASOPHILS # BLD AUTO: 0.04 10*3/MM3 (ref 0–0.2)
BASOPHILS NFR BLD AUTO: 0.4 % (ref 0–1.5)
BILIRUB SERPL-MCNC: 0.4 MG/DL (ref 0–1.2)
BUN SERPL-MCNC: 15 MG/DL (ref 8–23)
BUN/CREAT SERPL: 11.9 (ref 7–25)
CALCIUM SPEC-SCNC: 8.9 MG/DL (ref 8.6–10.5)
CHLORIDE SERPL-SCNC: 94 MMOL/L (ref 98–107)
CO2 SERPL-SCNC: 29 MMOL/L (ref 22–29)
CREAT SERPL-MCNC: 1.26 MG/DL (ref 0.76–1.27)
DEPRECATED RDW RBC AUTO: 47 FL (ref 37–54)
EGFRCR SERPLBLD CKD-EPI 2021: 57.3 ML/MIN/1.73
EOSINOPHIL # BLD AUTO: 0.21 10*3/MM3 (ref 0–0.4)
EOSINOPHIL NFR BLD AUTO: 2.1 % (ref 0.3–6.2)
ERYTHROCYTE [DISTWIDTH] IN BLOOD BY AUTOMATED COUNT: 13.1 % (ref 12.3–15.4)
GLOBULIN UR ELPH-MCNC: 3.3 GM/DL
GLUCOSE SERPL-MCNC: 69 MG/DL (ref 65–99)
HCT VFR BLD AUTO: 45.8 % (ref 37.5–51)
HGB BLD-MCNC: 15.5 G/DL (ref 13–17.7)
IMM GRANULOCYTES # BLD AUTO: 0.08 10*3/MM3 (ref 0–0.05)
IMM GRANULOCYTES NFR BLD AUTO: 0.8 % (ref 0–0.5)
LYMPHOCYTES # BLD AUTO: 1.63 10*3/MM3 (ref 0.7–3.1)
LYMPHOCYTES NFR BLD AUTO: 16.3 % (ref 19.6–45.3)
MCH RBC QN AUTO: 33.3 PG (ref 26.6–33)
MCHC RBC AUTO-ENTMCNC: 33.8 G/DL (ref 31.5–35.7)
MCV RBC AUTO: 98.3 FL (ref 79–97)
MONOCYTES # BLD AUTO: 1.15 10*3/MM3 (ref 0.1–0.9)
MONOCYTES NFR BLD AUTO: 11.5 % (ref 5–12)
NEUTROPHILS NFR BLD AUTO: 6.86 10*3/MM3 (ref 1.7–7)
NEUTROPHILS NFR BLD AUTO: 68.9 % (ref 42.7–76)
NRBC BLD AUTO-RTO: 0 /100 WBC (ref 0–0.2)
NT-PROBNP SERPL-MCNC: 166 PG/ML (ref 0–1800)
PLATELET # BLD AUTO: 221 10*3/MM3 (ref 140–450)
PMV BLD AUTO: 9.7 FL (ref 6–12)
POTASSIUM SERPL-SCNC: 4.1 MMOL/L (ref 3.5–5.2)
PROT SERPL-MCNC: 7.4 G/DL (ref 6–8.5)
RBC # BLD AUTO: 4.66 10*6/MM3 (ref 4.14–5.8)
SODIUM SERPL-SCNC: 137 MMOL/L (ref 136–145)
WBC NRBC COR # BLD AUTO: 9.97 10*3/MM3 (ref 3.4–10.8)

## 2025-04-25 PROCEDURE — 80053 COMPREHEN METABOLIC PANEL: CPT

## 2025-04-25 PROCEDURE — 36415 COLL VENOUS BLD VENIPUNCTURE: CPT

## 2025-04-25 PROCEDURE — 85025 COMPLETE CBC W/AUTO DIFF WBC: CPT

## 2025-04-25 PROCEDURE — 71046 X-RAY EXAM CHEST 2 VIEWS: CPT

## 2025-04-25 PROCEDURE — 83880 ASSAY OF NATRIURETIC PEPTIDE: CPT

## 2025-04-25 NOTE — PROGRESS NOTES
Primary Care Provider  Aaron Sandoval MD   Referring Provider  No ref. provider found    Patient Complaint  Shortness of Breath, acute, Cough (Productive grayish green started in January 2021), and Chest Tightness (Going on since Tuesday)    Patient or patient representative verbalized consent for the use of Ambient Listening during the visit with  SUSU Garcia for chart documentation. 4/25/2025  11:02 EDT      Subjective       History of Presenting Illness  Rafael Salcedo is a pleasant 79 y.o. male  of  [place]. [place] who presents to [facility] with history of severe emphysema, sleep apnea, bronchiectasis, history of Pseudomonas and Staphylococcus in respiratory culture, here for acute visit for shortness of air.  Patient is on O2 at 2 L pulsed dose when he is out and continuous at home.  He continues on chest vest therapy using it twice a day and is benefiting from its use.  His current medication management includes Trelegy 200 mcg, albuterol nebulizer and levalbuterol inhaler.    AFB culture from 4/9/2025 with preliminary result no growth at 2 weeks.  Respiratory culture from 4/9/2025 scant growth normal respiratory lyric for final result.    Pulmonary function test revealed very severe obstructive defect with response to bronchodilator therapies consistent with a COPD asthma overlap.  Lung volumes show no restriction and the DLCO is severely reduced.    History of Present Illness  The patient is an 79-year-old male who presents for an acute visit. He is accompanied by his wife.    He reports a persistent productive cough and shortness of breath, which he attributes to a change in his condition since Tuesday afternoon. He also mentions a lack of energy and motivation. He has not received any communication regarding his pulmonary function test results. He is currently on Trelegy and uses a chest vest as part of his treatment regimen.    He has been  experiencing edema in his feet and legs, which has been evaluated by his cardiologist, Dr. Casey, who ruled out congestive heart failure. He reports no chest pain or discomfort. He has undergone echocardiogram and EKG tests, as well as a Lexiscan stress test, all of which were normal. However, he was informed that there may be issues with the posterior aspect of his heart that are not visible on these tests. He was prescribed furosemide 20 mg to be taken as needed, but due to increased frequency of ankle swelling, he has been taking it more regularly, typically one dose in the morning and another at noon. He is currently not taking potassium supplement.  Spouse states they do have some potassium supplements at home and he did take one 1 night for leg cramps.    He occasionally experiences leg cramps at night.    MEDICATIONS  Trelegy, furosemide,          At present time patient denies wheezing, headaches, chest pain, weight loss or hemoptysis. Patient denies fevers, chills and night sweats. Rafael Salcedo is able to perform ADLs.      I have personally reviewed the review of systems, past family, social, medical and surgical histories; and agree with their findings.      Review of Systems   Constitutional: Negative.  Positive for fatigue.   HENT: Negative.     Respiratory: Negative.  Positive for cough and shortness of breath.    Cardiovascular: Negative.  Positive for leg swelling.   Musculoskeletal: Negative.    Neurological: Negative.    Psychiatric/Behavioral: Negative.           Family History   Problem Relation Age of Onset    Cancer Mother     Heart failure Father     Cancer Brother     Cancer Maternal Uncle     Malig Hyperthermia Neg Hx         Social History     Socioeconomic History    Marital status:     Highest education level: High school graduate   Tobacco Use    Smoking status: Former     Current packs/day: 0.00     Average packs/day: 1 pack/day for 58.0 years (58.0 ttl pk-yrs)     Types:  Cigarettes     Start date: 1962     Quit date: 8/15/2020     Years since quittin.6     Passive exposure: Past    Smokeless tobacco: Never    Tobacco comments:     1-6 cigg a day quit    Vaping Use    Vaping status: Never Used   Substance and Sexual Activity    Alcohol use: Not Currently    Drug use: Never    Sexual activity: Defer        Past Medical History:   Diagnosis Date    Emphysema of lung     Seasonal allergies     Shortness of breath     Sleep apnea     Tremors of nervous system     essential tremors        Immunization History   Administered Date(s) Administered    COVID-19 (MODERNA) 1st,2nd,3rd Dose Monovalent 2021, 2021    COVID-19 (MODERNA) Monovalent Original Booster 2021, 2021, 2022    Fluad Quad 65+ 2022    Fluzone High-Dose 65+YRS 2025    Pneumococcal Conjugate 13-Valent (PCV13) 2022    Pneumococcal Conjugate 20-Valent (PCV20) 2023       No Known Allergies       Current Outpatient Medications:     albuterol (PROVENTIL) (2.5 MG/3ML) 0.083% nebulizer solution, Take 2.5 mg by nebulization 4 (Four) Times a Day As Needed for Wheezing., Disp: 360 mL, Rfl: 3    atorvastatin (LIPITOR) 20 MG tablet, Take 1 tablet by mouth Daily., Disp: , Rfl:     Cholecalciferol (Vitamin D) 50 MCG (2000 UT) tablet, Take 1 tablet by mouth Daily., Disp: , Rfl:     Fluticasone-Umeclidin-Vilant (TRELEGY ELLIPTA) 200-62.5-25 MCG/ACT inhaler, Inhale 1 puff Daily., Disp: 3 each, Rfl: 3    furosemide (LASIX) 20 MG tablet, Take 1 tablet by mouth Daily., Disp: , Rfl:     levalbuterol (XOPENEX HFA) 45 MCG/ACT inhaler, Inhale 2 puffs Every 4 (Four) Hours As Needed for Wheezing., Disp: 15 g, Rfl: 5    montelukast (SINGULAIR) 10 MG tablet, Take 1 tablet by mouth Every Night., Disp: 90 tablet, Rfl: 3    nystatin (MYCOSTATIN) 561182 UNIT/GM cream, APPLY 1 APPLICATION TOPICALLY TO AFFECTED AREA TWICE DAILY AS DIRECTED, Disp: 15 g, Rfl: 0    primidone (MYSOLINE) 250 MG tablet,  "Take 1 tablet by mouth 2 (two) times a day., Disp: , Rfl:     Testosterone Cypionate (DEPOTESTOTERONE CYPIONATE) 200 MG/ML injection, Inject 0.25 mL into the appropriate muscle as directed by prescriber Every 28 (Twenty-Eight) Days., Disp: , Rfl:     Turmeric 500 MG capsule, Take  by mouth., Disp: , Rfl:     ammonium lactate (LAC-HYDRIN) 12 % lotion, APPLY TOPICALLY TO DRY SKIN (Patient not taking: Reported on 3/20/2024), Disp: , Rfl:     ascorbic acid (VITAMIN C) 1000 MG tablet, Take 1 tablet by mouth Daily. (Patient not taking: Reported on 3/20/2024), Disp: , Rfl:     mupirocin (BACTROBAN) 2 % ointment, APPLY OINTMENT TOPICALLY TO AFFECTED AREA THREE TIMES DAILY FOR 2 WEEKS (Patient not taking: Reported on 3/20/2024), Disp: , Rfl:     nystatin (MYCOSTATIN) 100,000 unit/mL suspension, Take 2 mL by mouth 4 (Four) Times a Day. (Patient not taking: Reported on 3/20/2024), Disp: 280 mL, Rfl: 2    triamcinolone (KENALOG) 0.1 % cream, APPLY DAILY AS NEEDED (Patient not taking: Reported on 3/20/2024), Disp: , Rfl:          Vital Signs   /79 (BP Location: Right arm, Patient Position: Sitting, Cuff Size: Adult)   Pulse 85   Temp 97.6 °F (36.4 °C)   Resp 16   Ht 177.8 cm (70\")   Wt 86.6 kg (191 lb)   SpO2 92%   BMI 27.41 kg/m²       Objective     Physical Exam  Constitutional:       General: He is not in acute distress.     Appearance: He is not ill-appearing.   Pulmonary:      Effort: No respiratory distress.      Breath sounds: No stridor. No wheezing, rhonchi or rales.   Musculoskeletal:      Right lower leg: Edema present.      Left lower leg: Edema present.      Comments: Trace edema bilateral lower legs/ankles, nonpitting         Physical Exam  Lungs are clear. No wheezing.  Heart was examined.         Results Review  I have personally reviewed the prior office notes, hospital records, labs, and diagnostics.  Respiratory Culture - Sputum, Cough  Order: [id number]   Status: Final result       Next appt: " Today at 11:00 AM in Pulmonology (Nancy Guthrie, SUSU)       Dx: Shortness of breath; Chronic bronchit...    Test Result Released: No (inaccessible in MyChart)       Messages: Not Seen    Specimen Information: Cough; Sputum   6 Result Notes       1 Patient Communication       View Follow-Up Encounter  Respiratory Culture   Lab   Scant growth (1+) Normal Respiratory Rosi  [facility]            Gram Stain  Lab   Many (4+) WBCs per low power field  [facility]   Rare (1+) Epithelial cells per low power field [facility]   Moderate (3+) Gram positive cocci in pairs, chains and clusters  [facility]   Few (2+) Gram negative bacilli [facility]                Specimen Collected: 04/09/25 09:00 EDT Last Resulted: 04/11/25 11:08 EDT   AFB Culture - , Cough  Order: [id number]   Status: Preliminary result       Next appt: Today at 11:00 AM in Pulmonology (Nancy Guthrie, SUSU)       Dx: Shortness of breath; Chronic bronchit...    Test Result Released: No    Specimen Information: Cough   6 Result Notes       View Follow-Up Encounter  AFB Culture No AFB isolated at 2 weeks            AFB Stain No acid fast bacilli seen on direct smear   No acid fast bacilli seen on concentrated smear           Resulting Agency: [facility]          Specimen Collected: 04/09/25 09:00 EDT Last Resulted: 04/23/25 09:45 EDT   Results  Complete PFT - Pre & Post Bronchodilator (Order [id number])  Order-Level Documents:    Scan on 3/19/2025 1512 by Neva Toth, APRN: PULMONARY FUNCTION TEST, [facility], 03/19/2025         Author: -- Service: -- Author Type: --   Filed: Date of Service: Creation Time:   Status: (Other)     Spirometry  FEV1/FVC 38%  FEV1 45% of predicted  FVC 85% of predicted  There is a significant response to bronchodilator therapy seen        Lung volumes  Total lung capacity 90% of predicted  Residual volume 103% of predicted           Diffusion  DLCO is 26% of predicted        Interpretation  Very severe obstructive defect with response to  bronchodilator therapies consistent with COPD with asthma  Lung volumes show no restriction  DLCO severely reduced               File Link    Scan on 3/19/2025 1512 by Neva Toth APRN: PULMONARY FUNCTION TEST, [facility], 03/19/2025        Key Information    Document ID File Type Document Type Description   [id number] Image PULMONARY FUNCTION TEST - SCAN PULMONARY FUNCTION TEST, [facility], 03/19/2025     Import Information    Attached At Date Time User Dept   Order Level 3/19/2025  3:12 PM Neva Toth APRN      Order    Pulmonary Function Test [[id number]]     Encounter    Hospital Encounter on 3/19/25 with [facility]OM 1     Results  Laboratory Studies  Sputum culture showed no growth.    Testing  Pulmonary function test: Forced vital capacity is normal. Forced expiratory volume in 1 minute is 45%. Total lung capacity is normal at 90%. Diffusion capacity is 26%.          Assessment         Patient Active Problem List   Diagnosis    Obstructive sleep apnea    Hypoxia    Ex-smoker    Chronic obstructive pulmonary disease    Bronchiectasis without complication    Positive QuantiFERON-TB Gold test    Tobacco abuse, in remission    Encounter for immunization        Plan     Diagnoses and all orders for this visit:    1. Pedal edema (Primary)  -     XR Chest 2 View; Future  -     BNP; Future  -     CBC & Differential; Future  -     Comprehensive Metabolic Panel; Future    2. Pulmonary emphysema, unspecified emphysema type  -     XR Chest 2 View; Future  -     BNP; Future  -     CBC & Differential; Future  -     Comprehensive Metabolic Panel; Future    3. Bronchiectasis without complication  -     XR Chest 2 View; Future  -     BNP; Future  -     CBC & Differential; Future  -     Comprehensive Metabolic Panel; Future    4. Obstructive sleep apnea  -     XR Chest 2 View; Future  -     BNP; Future  -     CBC & Differential; Future  -     Comprehensive Metabolic Panel; Future    5. Tobacco abuse, in remission  -      XR Chest 2 View; Future  -     BNP; Future  -     CBC & Differential; Future  -     Comprehensive Metabolic Panel; Future    6. Chronic respiratory failure with hypoxia  -     XR Chest 2 View; Future  -     BNP; Future  -     CBC & Differential; Future  -     Comprehensive Metabolic Panel; Future    7. Shortness of breath  -     XR Chest 2 View; Future  -     BNP; Future  -     CBC & Differential; Future  -     Comprehensive Metabolic Panel; Future    8. Cough, unspecified type  -     XR Chest 2 View; Future  -     BNP; Future  -     CBC & Differential; Future  -     Comprehensive Metabolic Panel; Future         Assessment & Plan  1. Chronic Obstructive Pulmonary Disease (COPD).  The patient has very severe COPD, as indicated by his pulmonary function test results. His forced expiratory volume in 1 minute is 45%, and his diffusion capacity is significantly reduced at 26%. He will continue using Trelegy and his chest vest to manage his symptoms. A chest x-ray will be ordered today to check for any signs of pneumonia or other lung infections. If his condition worsens, he should go to the hospital immediately.    2. Ankle Edema.  The patient reports swelling in his ankles, which has been evaluated by his cardiologist, Dr. Casey, who ruled out congestive heart failure. He is currently taking furosemide 20 mg as needed. Blood work will be ordered today to check his electrolytes, including potassium levels, as furosemide can cause potassium depletion. He is advised to hold off on taking potassium supplements until his potassium level is known. If his potassium levels are low, he may need to start taking potassium supplements.    3. Leg Cramps.  The patient experiences leg cramps at night, which may be related to low potassium levels. He is advised to hold off on taking potassium supplements until his potassium level is known. If his potassium levels are low, he may need to start taking potassium  supplements.      Smoking status:  reports that he quit smoking about 4 years ago. His smoking use included cigarettes. He started smoking about 62 years ago. He has a 58 pack-year smoking history. He has been exposed to tobacco smoke. He has never used smokeless tobacco.    Vaccination status: Reviewed  Immunization History   Administered Date(s) Administered    COVID-19 (MODERNA) 1st,2nd,3rd Dose Monovalent 02/02/2021, 02/26/2021    COVID-19 (MODERNA) Monovalent Original Booster 02/02/2021, 02/26/2021, 03/28/2022    Fluad Quad 65+ 11/25/2022    Fluzone High-Dose 65+YRS 01/16/2025    Pneumococcal Conjugate 13-Valent (PCV13) 01/04/2022    Pneumococcal Conjugate 20-Valent (PCV20) 09/07/2023        Medications personally reviewed    Follow Up  Return for Next scheduled follow up.    Patient was given instructions and counseling regarding his condition or for health maintenance advice. Please see specific information pulled into the AVS if appropriate.     I spent 28 minutes caring for Rafael Salcedo on this date of service. This time includes time spent by me in the following activities:preparing for the visit, reviewing tests, obtaining and/or reviewing a separately obtained history, performing a medically appropriate examination and/or evaluation, counseling and educating the patient/family/caregiver, ordering medications, tests, or procedures, documenting information in the medical record, independently interpreting results and communicating that information with the patient/family/caregiver and answered questions family members, discuss medications.

## 2025-04-28 LAB
MYCOBACTERIUM SPEC CULT: ABNORMAL
MYCOBACTERIUM SPEC CULT: ABNORMAL
NIGHT BLUE STAIN TISS: ABNORMAL

## 2025-05-01 ENCOUNTER — TELEPHONE (OUTPATIENT)
Dept: PULMONOLOGY | Facility: CLINIC | Age: 80
End: 2025-05-01
Payer: MEDICARE

## 2025-05-01 NOTE — TELEPHONE ENCOUNTER
Patient called and he would like to know how often he should take he Albuterol Neb medication. And if we have received any lab results. Please give patient a call

## 2025-05-22 ENCOUNTER — OFFICE VISIT (OUTPATIENT)
Dept: PULMONOLOGY | Facility: CLINIC | Age: 80
End: 2025-05-22
Payer: MEDICARE

## 2025-05-22 VITALS
OXYGEN SATURATION: 90 % | HEIGHT: 70 IN | TEMPERATURE: 97.8 F | SYSTOLIC BLOOD PRESSURE: 140 MMHG | HEART RATE: 80 BPM | DIASTOLIC BLOOD PRESSURE: 80 MMHG | BODY MASS INDEX: 27.35 KG/M2 | WEIGHT: 191 LBS | RESPIRATION RATE: 18 BRPM

## 2025-05-22 DIAGNOSIS — J96.11 CHRONIC RESPIRATORY FAILURE WITH HYPOXIA: ICD-10-CM

## 2025-05-22 DIAGNOSIS — J47.9 BRONCHIECTASIS WITHOUT COMPLICATION: ICD-10-CM

## 2025-05-22 DIAGNOSIS — A31.0 MYCOBACTERIUM AVIUM INFECTION: Primary | ICD-10-CM

## 2025-05-22 DIAGNOSIS — R06.02 SHORTNESS OF BREATH: ICD-10-CM

## 2025-05-22 DIAGNOSIS — J43.9 PULMONARY EMPHYSEMA, UNSPECIFIED EMPHYSEMA TYPE: ICD-10-CM

## 2025-05-22 RX ORDER — AZITHROMYCIN 500 MG/1
500 TABLET, FILM COATED ORAL 3 TIMES WEEKLY
Qty: 36 TABLET | Refills: 3 | Status: SHIPPED | OUTPATIENT
Start: 2025-05-23

## 2025-05-22 RX ORDER — ETHAMBUTOL HYDROCHLORIDE 400 MG/1
1200 TABLET, FILM COATED ORAL 3 TIMES WEEKLY
Qty: 108 TABLET | Refills: 3 | Status: SHIPPED | OUTPATIENT
Start: 2025-05-23

## 2025-05-22 RX ORDER — RIFAMPIN 300 MG/1
600 CAPSULE ORAL 3 TIMES WEEKLY
Qty: 72 CAPSULE | Refills: 3 | Status: SHIPPED | OUTPATIENT
Start: 2025-05-23

## 2025-05-22 NOTE — PROGRESS NOTES
Primary Care Provider  Aaron Sandoval MD   Referring Provider  No ref. provider found    Patient Complaint  Follow-up, MAC infection, Shortness of Breath, and Cough (Constantly productive mostly green/ grayish)    Patient or patient representative verbalized consent for the use of Ambient Listening during the visit with  SUSU Garcia for chart documentation. 5/22/2025  15:09 EDT      Subjective       History of Presenting Illness  Rafael Salcedo is a pleasant 79 y.o. male  of  Dr. Boyle who presents to [facility] with history of severe emphysema, sleep apnea, bronchiectasis, history of Pseudomonas and Staphylococcus in respiratory culture, recently diagnosed with MAC infection,  here to discuss medication management for MAC infection.      History of Present Illness  The patient is an 79-year-old male who presents for Mycobacterium avium complex infection. He is accompanied by his daughter.    He reports no chest pain or wheezing. He has been experiencing fatigue and low energy levels, which he attributes to his MAC infection. He has been using Mullein leaf, an herbal remedy for respiratory issues, which initially improved his symptoms but has since plateaued. He continues to use Trelegy 200 and is considering the use of probiotics. He is currently on Trelegy 200 and uses a CPAP machine, which he maintains in accordance with the 's instructions. He recalls a previous positive TB test, which necessitated a 6-week quarantine period, but subsequent tests were negative. He was previously on IV antibiotics following a positive respiratory culture Pseudomonas and Staphylococcus in November 2024.    He was prescribed cholesterol medication by Dr. Casey following a stress test and arterial blockage evaluation. He was also advised to undergo biannual blood work due to his use of primidone. His cholesterol levels were slightly elevated during his  last blood work, which he believes may be due to dietary factors.  He is currently on O2 at 2 L pulsed dose via Inogene machine.  He is benefiting from supplemental oxygen at this time.    MEDICATIONS  Current: Trelegy, primidone, Lipitor     At present time patient denies dyspnea, coughing, wheezing, headaches, chest pain, weight loss or hemoptysis. Patient denies fevers, chills and night sweats. Rafael Salcedo is able to perform ADLs.      I have personally reviewed the review of systems, past family, social, medical and surgical histories; and agree with their findings.      Review of Systems   Constitutional: Negative.    HENT: Negative.     Respiratory: Negative.     Cardiovascular: Negative.    Musculoskeletal: Negative.    Neurological: Negative.    Psychiatric/Behavioral: Negative.           Family History   Problem Relation Age of Onset    Cancer Mother     Heart failure Father     Cancer Brother     Cancer Maternal Uncle     Malig Hyperthermia Neg Hx         Social History     Socioeconomic History    Marital status:     Highest education level: High school graduate   Tobacco Use    Smoking status: Former     Current packs/day: 0.00     Average packs/day: 1 pack/day for 58.0 years (58.0 ttl pk-yrs)     Types: Cigarettes     Start date: 1962     Quit date: 8/15/2020     Years since quittin.7     Passive exposure: Past    Smokeless tobacco: Never    Tobacco comments:     1-6 cigg a day quit    Vaping Use    Vaping status: Never Used   Substance and Sexual Activity    Alcohol use: Not Currently    Drug use: Never    Sexual activity: Defer        Past Medical History:   Diagnosis Date    Emphysema of lung     Seasonal allergies     Shortness of breath     Sleep apnea     Tremors of nervous system     essential tremors        Immunization History   Administered Date(s) Administered    COVID-19 (MODERNA) 1st,2nd,3rd Dose Monovalent 2021, 2021    COVID-19 (MODERNA) Monovalent  Original Booster 02/02/2021, 02/26/2021, 03/28/2022    Fluad Quad 65+ 11/25/2022    Fluzone High-Dose 65+YRS 01/16/2025    Pneumococcal Conjugate 13-Valent (PCV13) 01/04/2022    Pneumococcal Conjugate 20-Valent (PCV20) 09/07/2023       No Known Allergies       Current Outpatient Medications:     albuterol (PROVENTIL) (2.5 MG/3ML) 0.083% nebulizer solution, Take 2.5 mg by nebulization 4 (Four) Times a Day As Needed for Wheezing., Disp: 360 mL, Rfl: 3    atorvastatin (LIPITOR) 20 MG tablet, Take 1 tablet by mouth Daily., Disp: , Rfl:     Cholecalciferol (Vitamin D) 50 MCG (2000 UT) tablet, Take 1 tablet by mouth Daily., Disp: , Rfl:     Fluticasone-Umeclidin-Vilant (TRELEGY ELLIPTA) 200-62.5-25 MCG/ACT inhaler, Inhale 1 puff Daily., Disp: 3 each, Rfl: 3    furosemide (LASIX) 20 MG tablet, Take 1 tablet by mouth Daily., Disp: , Rfl:     levalbuterol (XOPENEX HFA) 45 MCG/ACT inhaler, Inhale 2 puffs Every 4 (Four) Hours As Needed for Wheezing., Disp: 15 g, Rfl: 5    montelukast (SINGULAIR) 10 MG tablet, Take 1 tablet by mouth Every Night., Disp: 90 tablet, Rfl: 3    nystatin (MYCOSTATIN) 286010 UNIT/GM cream, APPLY 1 APPLICATION TOPICALLY TO AFFECTED AREA TWICE DAILY AS DIRECTED, Disp: 15 g, Rfl: 0    primidone (MYSOLINE) 250 MG tablet, Take 1 tablet by mouth 2 (two) times a day., Disp: , Rfl:     Testosterone Cypionate (DEPOTESTOTERONE CYPIONATE) 200 MG/ML injection, Inject 0.25 mL into the appropriate muscle as directed by prescriber Every 28 (Twenty-Eight) Days., Disp: , Rfl:     Turmeric 500 MG capsule, Take  by mouth., Disp: , Rfl:     ammonium lactate (LAC-HYDRIN) 12 % lotion, APPLY TOPICALLY TO DRY SKIN (Patient not taking: Reported on 5/22/2025), Disp: , Rfl:     ascorbic acid (VITAMIN C) 1000 MG tablet, Take 1 tablet by mouth Daily. (Patient not taking: Reported on 5/22/2025), Disp: , Rfl:     [START ON 5/23/2025] azithromycin (Zithromax) 500 MG tablet, Take 1 tablet by mouth 3 (Three) Times a Week., Disp: 36  "tablet, Rfl: 3    [START ON 5/23/2025] ethambutol (MYAMBUTOL) 400 MG tablet, Take 3 tablets by mouth 3 (Three) Times a Week., Disp: 108 tablet, Rfl: 3    mupirocin (BACTROBAN) 2 % ointment, APPLY OINTMENT TOPICALLY TO AFFECTED AREA THREE TIMES DAILY FOR 2 WEEKS (Patient not taking: Reported on 5/22/2025), Disp: , Rfl:     nystatin (MYCOSTATIN) 100,000 unit/mL suspension, Take 2 mL by mouth 4 (Four) Times a Day. (Patient not taking: Reported on 5/22/2025), Disp: 280 mL, Rfl: 2    [START ON 5/23/2025] rifAMPin (Rifadin) 300 MG capsule, Take 2 capsules by mouth 3 (Three) Times a Week., Disp: 72 capsule, Rfl: 3    triamcinolone (KENALOG) 0.1 % cream, APPLY DAILY AS NEEDED (Patient not taking: Reported on 5/22/2025), Disp: , Rfl:          Vital Signs   /80 (BP Location: Right arm, Patient Position: Sitting, Cuff Size: Adult)   Pulse 80   Temp 97.8 °F (36.6 °C)   Resp 18   Ht 177.8 cm (70\")   Wt 86.6 kg (191 lb)   SpO2 90%   BMI 27.41 kg/m²       Objective     Physical Exam  Vitals reviewed.   Constitutional:       Appearance: Normal appearance.   HENT:      Head: Normocephalic and atraumatic.      Nose: Nose normal.      Mouth/Throat:      Mouth: Mucous membranes are moist.      Pharynx: Oropharynx is clear.   Eyes:      Extraocular Movements: Extraocular movements intact.      Conjunctiva/sclera: Conjunctivae normal.      Pupils: Pupils are equal, round, and reactive to light.   Cardiovascular:      Rate and Rhythm: Normal rate and regular rhythm.      Pulses: Normal pulses.      Heart sounds: Normal heart sounds.   Pulmonary:      Effort: Pulmonary effort is normal.      Breath sounds: Normal breath sounds.   Abdominal:      General: Bowel sounds are normal.   Musculoskeletal:         General: Normal range of motion.      Cervical back: Normal range of motion and neck supple.   Skin:     General: Skin is warm and dry.   Neurological:      Mental Status: He is alert and oriented to person, place, and time. "   Psychiatric:         Behavior: Behavior normal.         Physical Exam  Lungs were auscultated.  Heart was examined.         Results Review  I have personally reviewed the prior office notes, hospital records, labs, and diagnostics.  AFB Culture - , Cough  Order: [id number]   Status: Final result       Next appt: Today at 03:00 PM in Pulmonology (Nancy Guthrie, APRN)       Dx: Shortness of breath; Chronic bronchit...    Test Result Released: No (inaccessible in MyChart)       Messages: Not Seen    Specimen Information: Cough   11 Result Notes       1 Patient Communication       View Follow-Up Encounter  AFB Culture Abnormal Stain Critical    Mycobacterium avium complex Abnormal       Sent to hospitals for Id  ID performed by [facility]                  [address]                   [place] [address]            AFB Stain No acid fast bacilli seen on direct smear   No acid fast bacilli seen on concentrated smear   Acid fast bacilli seen on smear of broth culture           Resulting Agency: [facility]          Specimen Collected: 04/09/25 09:00 EDT Last Resulted: 05/13/25 10:41 EDT   Comprehensive Metabolic Panel  Order: [id number]   Status: Final result       Next appt: Today at 03:00 PM in Pulmonology (Nancy Guthrie, APRN)       Dx: Shortness of breath; Tobacco abuse, i...    Test Result Released: No (inaccessible in MyChart)       Messages: Not Seen    Specimen Information: Arm, Right; Blood   2 Result Notes       1 Patient Communication       View Follow-Up Encounter      Component  Ref Range & Units (hover) 3 wk ago   Glucose 69   BUN 15   Creatinine 1.26   Sodium 137   Potassium 4.1   Chloride 94 Low    CO2 29.0   Calcium 8.9   Total Protein 7.4   Albumin 4.1   ALT (SGPT) 16   AST (SGOT) 23   Alkaline Phosphatase 104   Total Bilirubin 0.4   Globulin 3.3   A/G Ratio 1.2   BUN/Creatinine Ratio 11.9   Anion Gap 14.0   eGFR 57.3 Low    Resulting Agency  [facility]        Results  Laboratory  Studies  Sputum test showed Mycobacterium avium complex infection. Liver enzymes are normal. GFR is 57.3. Creatinine is normal.      Assessment         Patient Active Problem List   Diagnosis    Obstructive sleep apnea    Hypoxia    Ex-smoker    Chronic obstructive pulmonary disease    Bronchiectasis without complication    Positive QuantiFERON-TB Gold test    Tobacco abuse, in remission    Encounter for immunization        Plan     Diagnoses and all orders for this visit:    1. Mycobacterium avium infection (Primary)  -     rifAMPin (Rifadin) 300 MG capsule; Take 2 capsules by mouth 3 (Three) Times a Week.  Dispense: 72 capsule; Refill: 3  -     ethambutol (MYAMBUTOL) 400 MG tablet; Take 3 tablets by mouth 3 (Three) Times a Week.  Dispense: 108 tablet; Refill: 3  -     azithromycin (Zithromax) 500 MG tablet; Take 1 tablet by mouth 3 (Three) Times a Week.  Dispense: 36 tablet; Refill: 3  -     Comprehensive Metabolic Panel; Standing  -     AFB Culture - , Cough; Standing  -     CT Chest Without Contrast; Future    2. Pulmonary emphysema, unspecified emphysema type  -     CT Chest Without Contrast; Future    3. Bronchiectasis without complication  -     CT Chest Without Contrast; Future    4. Chronic respiratory failure with hypoxia  -     CT Chest Without Contrast; Future    5. Shortness of breath  -     CT Chest Without Contrast; Future         Assessment & Plan  1. Mycobacterium avium complex infection.  The sputum test results indicate a non-tuberculosis type infection in the lung, specifically Mycobacterium avium complex infection. This condition necessitates a prolonged treatment regimen of 6 months to a year, involving a triple therapy of medications. The patient will be administered azithromycin 500 mg, ethambutol 3 tablets, and rifampin 2 capsules, all to be taken orally three times a week on Monday, Wednesday, and Friday. Potential side effects of these medications, including stomach upset, nausea,  dizziness, blurry vision, and orange discoloration of urine and tears, have been discussed. The patient has been advised to discontinue his cholesterol medication while on rifampin. A standing order for blood work and sputum tests every 2 months has been placed to monitor the effectiveness of the treatment and ensure no damage to the kidneys or liver. A CT scan of the lungs will be scheduled in 2 to 3 months to assess the progress of the treatment. If the medications prove too costly, alternative options will be explored. If the patient experiences elevated liver enzymes or side effects, the treatment plan will be adjusted accordingly.    2. Medication management.  The patient is currently on 20 mg of Lipitor for cholesterol management. He has been advised to discontinue this medication while on rifampin. The patient should inform his cardiologist or primary care provider about this temporary discontinuation to manage his cholesterol levels appropriately.      Smoking status:  reports that he quit smoking about 4 years ago. His smoking use included cigarettes. He started smoking about 62 years ago. He has a 58 pack-year smoking history. He has been exposed to tobacco smoke. He has never used smokeless tobacco.    Vaccination status: Reviewed  Immunization History   Administered Date(s) Administered    COVID-19 (MODERNA) 1st,2nd,3rd Dose Monovalent 02/02/2021, 02/26/2021    COVID-19 (MODERNA) Monovalent Original Booster 02/02/2021, 02/26/2021, 03/28/2022    Fluad Quad 65+ 11/25/2022    Fluzone High-Dose 65+YRS 01/16/2025    Pneumococcal Conjugate 13-Valent (PCV13) 01/04/2022    Pneumococcal Conjugate 20-Valent (PCV20) 09/07/2023        Medications personally reviewed    Follow Up  Return in about 3 months (around 8/22/2025) for Dr. Boyle or Nancy.    Patient was given instructions and counseling regarding his condition or for health maintenance advice. Please see specific information pulled into the AVS if  appropriate.     I spent 35 minutes caring for Rafael Salcedo on this date of service. This time includes time spent by me in the following activities:preparing for the visit, reviewing tests, obtaining and/or reviewing a separately obtained history, performing a medically appropriate examination and/or evaluation, counseling and educating the patient/family/caregiver, ordering medications, tests, or procedures, documenting information in the medical record, independently interpreting results and communicating that information with the patient/family/caregiver and answered questions family members, discuss medications.

## 2025-07-11 ENCOUNTER — LAB (OUTPATIENT)
Dept: LAB | Facility: HOSPITAL | Age: 80
End: 2025-07-11
Payer: MEDICARE

## 2025-07-11 DIAGNOSIS — A31.0 MYCOBACTERIUM AVIUM INFECTION: ICD-10-CM

## 2025-07-11 LAB
ALBUMIN SERPL-MCNC: 4.1 G/DL (ref 3.5–5.2)
ALBUMIN/GLOB SERPL: 1.2 G/DL
ALP SERPL-CCNC: 129 U/L (ref 39–117)
ALT SERPL W P-5'-P-CCNC: 16 U/L (ref 1–41)
ANION GAP SERPL CALCULATED.3IONS-SCNC: 11.3 MMOL/L (ref 5–15)
AST SERPL-CCNC: 27 U/L (ref 1–40)
BILIRUB SERPL-MCNC: 0.8 MG/DL (ref 0–1.2)
BUN SERPL-MCNC: 21 MG/DL (ref 8–23)
BUN/CREAT SERPL: 20 (ref 7–25)
CALCIUM SPEC-SCNC: 8.9 MG/DL (ref 8.6–10.5)
CHLORIDE SERPL-SCNC: 100 MMOL/L (ref 98–107)
CO2 SERPL-SCNC: 24.7 MMOL/L (ref 22–29)
CREAT SERPL-MCNC: 1.05 MG/DL (ref 0.76–1.27)
EGFRCR SERPLBLD CKD-EPI 2021: 71.3 ML/MIN/1.73
GLOBULIN UR ELPH-MCNC: 3.3 GM/DL
GLUCOSE SERPL-MCNC: 90 MG/DL (ref 65–99)
POTASSIUM SERPL-SCNC: 4.4 MMOL/L (ref 3.5–5.2)
PROT SERPL-MCNC: 7.4 G/DL (ref 6–8.5)
SODIUM SERPL-SCNC: 136 MMOL/L (ref 136–145)

## 2025-07-11 PROCEDURE — 80053 COMPREHEN METABOLIC PANEL: CPT

## 2025-07-11 PROCEDURE — 36415 COLL VENOUS BLD VENIPUNCTURE: CPT

## 2025-07-16 ENCOUNTER — LAB (OUTPATIENT)
Dept: LAB | Facility: HOSPITAL | Age: 80
End: 2025-07-16
Payer: MEDICARE

## 2025-07-16 DIAGNOSIS — A31.0 MYCOBACTERIUM AVIUM INFECTION: ICD-10-CM

## 2025-07-16 PROCEDURE — 87116 MYCOBACTERIA CULTURE: CPT

## 2025-07-16 PROCEDURE — 87206 SMEAR FLUORESCENT/ACID STAI: CPT

## 2025-07-21 ENCOUNTER — HOSPITAL ENCOUNTER (OUTPATIENT)
Dept: CT IMAGING | Facility: HOSPITAL | Age: 80
Discharge: HOME | End: 2025-07-21
Admitting: NURSE PRACTITIONER
Payer: MEDICARE

## 2025-07-21 DIAGNOSIS — A31.0 MYCOBACTERIUM AVIUM INFECTION: ICD-10-CM

## 2025-07-21 DIAGNOSIS — J47.9 BRONCHIECTASIS WITHOUT COMPLICATION: ICD-10-CM

## 2025-07-21 DIAGNOSIS — R06.02 SHORTNESS OF BREATH: ICD-10-CM

## 2025-07-21 DIAGNOSIS — J96.11 CHRONIC RESPIRATORY FAILURE WITH HYPOXIA: ICD-10-CM

## 2025-07-21 DIAGNOSIS — J43.9 PULMONARY EMPHYSEMA, UNSPECIFIED EMPHYSEMA TYPE: ICD-10-CM

## 2025-07-21 PROCEDURE — 71250 CT THORAX DX C-: CPT

## 2025-07-23 LAB
MYCOBACTERIUM SPEC CULT: NORMAL
NIGHT BLUE STAIN TISS: NORMAL
NIGHT BLUE STAIN TISS: NORMAL

## 2025-07-30 LAB
MYCOBACTERIUM SPEC CULT: NORMAL
NIGHT BLUE STAIN TISS: NORMAL
NIGHT BLUE STAIN TISS: NORMAL

## 2025-08-04 ENCOUNTER — OFFICE VISIT (OUTPATIENT)
Dept: PULMONOLOGY | Facility: CLINIC | Age: 80
End: 2025-08-04
Payer: MEDICARE

## 2025-08-04 VITALS
RESPIRATION RATE: 18 BRPM | OXYGEN SATURATION: 93 % | SYSTOLIC BLOOD PRESSURE: 128 MMHG | TEMPERATURE: 97.9 F | WEIGHT: 189 LBS | DIASTOLIC BLOOD PRESSURE: 74 MMHG | HEART RATE: 93 BPM | BODY MASS INDEX: 27.06 KG/M2 | HEIGHT: 70 IN

## 2025-08-04 DIAGNOSIS — B37.0 THRUSH, ORAL: ICD-10-CM

## 2025-08-04 DIAGNOSIS — J47.9 BRONCHIECTASIS WITHOUT COMPLICATION: ICD-10-CM

## 2025-08-04 DIAGNOSIS — J43.9 PULMONARY EMPHYSEMA, UNSPECIFIED EMPHYSEMA TYPE: ICD-10-CM

## 2025-08-04 DIAGNOSIS — B36.9 FUNGAL INFECTION OF SKIN: Primary | ICD-10-CM

## 2025-08-04 PROCEDURE — 1160F RVW MEDS BY RX/DR IN RCRD: CPT | Performed by: INTERNAL MEDICINE

## 2025-08-04 PROCEDURE — 99214 OFFICE O/P EST MOD 30 MIN: CPT | Performed by: INTERNAL MEDICINE

## 2025-08-04 PROCEDURE — 1159F MED LIST DOCD IN RCRD: CPT | Performed by: INTERNAL MEDICINE

## 2025-08-04 RX ORDER — NYSTATIN 100000 U/G
CREAM TOPICAL AS NEEDED
Qty: 15 G | Refills: 0 | Status: SHIPPED | OUTPATIENT
Start: 2025-08-04

## 2025-08-06 LAB
MYCOBACTERIUM SPEC CULT: NORMAL
NIGHT BLUE STAIN TISS: NORMAL
NIGHT BLUE STAIN TISS: NORMAL

## 2025-08-13 LAB
MYCOBACTERIUM SPEC CULT: NORMAL
NIGHT BLUE STAIN TISS: NORMAL
NIGHT BLUE STAIN TISS: NORMAL

## 2025-08-19 ENCOUNTER — TELEPHONE (OUTPATIENT)
Dept: PULMONOLOGY | Facility: CLINIC | Age: 80
End: 2025-08-19
Payer: MEDICARE

## 2025-08-20 LAB
MYCOBACTERIUM SPEC CULT: NORMAL
NIGHT BLUE STAIN TISS: NORMAL
NIGHT BLUE STAIN TISS: NORMAL

## 2025-08-27 LAB
MYCOBACTERIUM SPEC CULT: NORMAL
NIGHT BLUE STAIN TISS: NORMAL
NIGHT BLUE STAIN TISS: NORMAL

## (undated) DEVICE — SINGLE USE BIOPSY VALVE MAJ-210: Brand: SINGLE USE BIOPSY VALVE (STERILE)

## (undated) DEVICE — CUP SPECI 4OZ LF STRL

## (undated) DEVICE — DEV ATOMIZATION MUCOSAL/NASALTRACH

## (undated) DEVICE — SYR LUER SLPTP 50ML

## (undated) DEVICE — SINGLE USE SUCTION VALVE MAJ-209: Brand: SINGLE USE SUCTION VALVE (STERILE)

## (undated) DEVICE — TRAP,MUCUS SPECIMEN,40CC: Brand: MEDLINE

## (undated) DEVICE — BRONCH KIT: Brand: MEDLINE INDUSTRIES, INC.